# Patient Record
Sex: FEMALE | Race: BLACK OR AFRICAN AMERICAN | NOT HISPANIC OR LATINO
[De-identification: names, ages, dates, MRNs, and addresses within clinical notes are randomized per-mention and may not be internally consistent; named-entity substitution may affect disease eponyms.]

---

## 2017-11-10 PROBLEM — Z00.00 ENCOUNTER FOR PREVENTIVE HEALTH EXAMINATION: Status: ACTIVE | Noted: 2017-11-10

## 2017-11-13 ENCOUNTER — APPOINTMENT (OUTPATIENT)
Dept: INTERNAL MEDICINE | Facility: CLINIC | Age: 57
End: 2017-11-13

## 2017-11-27 ENCOUNTER — APPOINTMENT (OUTPATIENT)
Dept: INTERNAL MEDICINE | Facility: CLINIC | Age: 57
End: 2017-11-27

## 2018-08-12 ENCOUNTER — INPATIENT (INPATIENT)
Facility: HOSPITAL | Age: 58
LOS: 4 days | Discharge: LEFT BEFORE DISCHARGE | DRG: 871 | End: 2018-08-17
Attending: INTERNAL MEDICINE | Admitting: INTERNAL MEDICINE
Payer: MEDICARE

## 2018-08-12 VITALS
RESPIRATION RATE: 22 BRPM | SYSTOLIC BLOOD PRESSURE: 112 MMHG | OXYGEN SATURATION: 94 % | TEMPERATURE: 98 F | DIASTOLIC BLOOD PRESSURE: 77 MMHG | HEART RATE: 88 BPM

## 2018-08-12 DIAGNOSIS — R94.31 ABNORMAL ELECTROCARDIOGRAM [ECG] [EKG]: ICD-10-CM

## 2018-08-12 DIAGNOSIS — A41.9 SEPSIS, UNSPECIFIED ORGANISM: ICD-10-CM
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
## 2018-08-12 DIAGNOSIS — Z91.89 OTHER SPECIFIED PERSONAL RISK FACTORS, NOT ELSEWHERE CLASSIFIED: ICD-10-CM

## 2018-08-12 DIAGNOSIS — R74.8 ABNORMAL LEVELS OF OTHER SERUM ENZYMES: ICD-10-CM

## 2018-08-12 DIAGNOSIS — J18.1 LOBAR PNEUMONIA, UNSPECIFIED ORGANISM: ICD-10-CM

## 2018-08-12 DIAGNOSIS — R63.8 OTHER SYMPTOMS AND SIGNS CONCERNING FOOD AND FLUID INTAKE: ICD-10-CM

## 2018-08-12 DIAGNOSIS — J44.1 CHRONIC OBSTRUCTIVE PULMONARY DISEASE WITH (ACUTE) EXACERBATION: ICD-10-CM

## 2018-08-12 LAB
ALBUMIN SERPL ELPH-MCNC: 2.9 G/DL — LOW (ref 3.4–5)
ALP SERPL-CCNC: 97 U/L — SIGNIFICANT CHANGE UP (ref 40–120)
ALT FLD-CCNC: 270 U/L — HIGH (ref 12–42)
ANION GAP SERPL CALC-SCNC: 15 MMOL/L — SIGNIFICANT CHANGE UP (ref 9–16)
APTT BLD: 32.8 SEC — SIGNIFICANT CHANGE UP (ref 27.5–36.5)
AST SERPL-CCNC: 355 U/L — HIGH (ref 15–37)
BASE EXCESS BLDA CALC-SCNC: -4.4 MMOL/L — LOW (ref -2–3)
BILIRUB SERPL-MCNC: 1.5 MG/DL — HIGH (ref 0.2–1.2)
BUN SERPL-MCNC: 16 MG/DL — SIGNIFICANT CHANGE UP (ref 7–23)
CALCIUM SERPL-MCNC: 9.1 MG/DL — SIGNIFICANT CHANGE UP (ref 8.5–10.5)
CHLORIDE SERPL-SCNC: 99 MMOL/L — SIGNIFICANT CHANGE UP (ref 96–108)
CK MB BLD-MCNC: 0.31 % — SIGNIFICANT CHANGE UP
CK MB CFR SERPL CALC: 3.6 NG/ML — SIGNIFICANT CHANGE UP (ref 0.5–3.6)
CK SERPL-CCNC: 1180 U/L — HIGH (ref 26–192)
CO2 SERPL-SCNC: 20 MMOL/L — LOW (ref 22–31)
CREAT SERPL-MCNC: 1.36 MG/DL — HIGH (ref 0.5–1.3)
D DIMER BLD IA.RAPID-MCNC: 1099 NG/ML DDU — HIGH
GAS PNL BLDA: SIGNIFICANT CHANGE UP
GLUCOSE BLDC GLUCOMTR-MCNC: 202 MG/DL — HIGH (ref 70–99)
GLUCOSE SERPL-MCNC: 148 MG/DL — HIGH (ref 70–99)
HCO3 BLDA-SCNC: 19 MMOL/L — LOW (ref 21–28)
HCT VFR BLD CALC: 39.9 % — SIGNIFICANT CHANGE UP (ref 34.5–45)
HGB BLD-MCNC: 13 G/DL — SIGNIFICANT CHANGE UP (ref 11.5–15.5)
INR BLD: 1.5 — HIGH (ref 0.88–1.16)
LACTATE SERPL-SCNC: 1.6 MMOL/L — SIGNIFICANT CHANGE UP (ref 0.4–2)
MCHC RBC-ENTMCNC: 26.1 PG — LOW (ref 27–34)
MCHC RBC-ENTMCNC: 32.6 G/DL — SIGNIFICANT CHANGE UP (ref 32–36)
MCV RBC AUTO: 80.1 FL — SIGNIFICANT CHANGE UP (ref 80–100)
PCO2 BLDA: 32 MMHG — SIGNIFICANT CHANGE UP (ref 32–45)
PH BLDA: 7.41 — SIGNIFICANT CHANGE UP (ref 7.35–7.45)
PLATELET # BLD AUTO: 181 K/UL — SIGNIFICANT CHANGE UP (ref 150–400)
PO2 BLDA: 89 MMHG — SIGNIFICANT CHANGE UP (ref 83–108)
POTASSIUM SERPL-MCNC: 3.4 MMOL/L — LOW (ref 3.5–5.3)
POTASSIUM SERPL-SCNC: 3.4 MMOL/L — LOW (ref 3.5–5.3)
PROT SERPL-MCNC: 8.4 G/DL — HIGH (ref 6.4–8.2)
PROTHROM AB SERPL-ACNC: 16.6 SEC — HIGH (ref 9.8–12.7)
RBC # BLD: 4.98 M/UL — SIGNIFICANT CHANGE UP (ref 3.8–5.2)
RBC # FLD: 14.5 % — SIGNIFICANT CHANGE UP (ref 10.3–16.9)
SAO2 % BLDA: 97 % — SIGNIFICANT CHANGE UP (ref 95–100)
SODIUM SERPL-SCNC: 134 MMOL/L — SIGNIFICANT CHANGE UP (ref 132–145)
TROPONIN I SERPL-MCNC: <0.017 NG/ML — LOW (ref 0.02–0.06)
WBC # BLD: 11.4 K/UL — HIGH (ref 3.8–10.5)
WBC # FLD AUTO: 11.4 K/UL — HIGH (ref 3.8–10.5)

## 2018-08-12 PROCEDURE — 93010 ELECTROCARDIOGRAM REPORT: CPT

## 2018-08-12 PROCEDURE — 71045 X-RAY EXAM CHEST 1 VIEW: CPT | Mod: 26

## 2018-08-12 PROCEDURE — 71275 CT ANGIOGRAPHY CHEST: CPT | Mod: 26

## 2018-08-12 PROCEDURE — 99223 1ST HOSP IP/OBS HIGH 75: CPT | Mod: GC

## 2018-08-12 PROCEDURE — 99220: CPT | Mod: 25

## 2018-08-12 RX ORDER — VANCOMYCIN HCL 1 G
1250 VIAL (EA) INTRAVENOUS ONCE
Qty: 0 | Refills: 0 | Status: COMPLETED | OUTPATIENT
Start: 2018-08-12 | End: 2018-08-12

## 2018-08-12 RX ORDER — PETROLATUM,WHITE
1 JELLY (GRAM) TOPICAL DAILY
Qty: 0 | Refills: 0 | Status: DISCONTINUED | OUTPATIENT
Start: 2018-08-12 | End: 2018-08-17

## 2018-08-12 RX ORDER — VANCOMYCIN HCL 1 G
VIAL (EA) INTRAVENOUS
Qty: 0 | Refills: 0 | Status: DISCONTINUED | OUTPATIENT
Start: 2018-08-12 | End: 2018-08-12

## 2018-08-12 RX ORDER — PIPERACILLIN AND TAZOBACTAM 4; .5 G/20ML; G/20ML
3.38 INJECTION, POWDER, LYOPHILIZED, FOR SOLUTION INTRAVENOUS ONCE
Qty: 0 | Refills: 0 | Status: COMPLETED | OUTPATIENT
Start: 2018-08-12 | End: 2018-08-12

## 2018-08-12 RX ORDER — IPRATROPIUM/ALBUTEROL SULFATE 18-103MCG
3 AEROSOL WITH ADAPTER (GRAM) INHALATION EVERY 4 HOURS
Qty: 0 | Refills: 0 | Status: DISCONTINUED | OUTPATIENT
Start: 2018-08-12 | End: 2018-08-17

## 2018-08-12 RX ORDER — SODIUM CHLORIDE 9 MG/ML
1000 INJECTION, SOLUTION INTRAVENOUS
Qty: 0 | Refills: 0 | Status: DISCONTINUED | OUTPATIENT
Start: 2018-08-12 | End: 2018-08-12

## 2018-08-12 RX ORDER — DEXTROSE 50 % IN WATER 50 %
25 SYRINGE (ML) INTRAVENOUS ONCE
Qty: 0 | Refills: 0 | Status: DISCONTINUED | OUTPATIENT
Start: 2018-08-12 | End: 2018-08-17

## 2018-08-12 RX ORDER — GLUCAGON INJECTION, SOLUTION 0.5 MG/.1ML
1 INJECTION, SOLUTION SUBCUTANEOUS ONCE
Qty: 0 | Refills: 0 | Status: DISCONTINUED | OUTPATIENT
Start: 2018-08-12 | End: 2018-08-17

## 2018-08-12 RX ORDER — VANCOMYCIN HCL 1 G
1250 VIAL (EA) INTRAVENOUS EVERY 12 HOURS
Qty: 0 | Refills: 0 | Status: DISCONTINUED | OUTPATIENT
Start: 2018-08-13 | End: 2018-08-13

## 2018-08-12 RX ORDER — PIPERACILLIN AND TAZOBACTAM 4; .5 G/20ML; G/20ML
3.38 INJECTION, POWDER, LYOPHILIZED, FOR SOLUTION INTRAVENOUS EVERY 6 HOURS
Qty: 0 | Refills: 0 | Status: DISCONTINUED | OUTPATIENT
Start: 2018-08-12 | End: 2018-08-13

## 2018-08-12 RX ORDER — DEXTROSE 50 % IN WATER 50 %
15 SYRINGE (ML) INTRAVENOUS ONCE
Qty: 0 | Refills: 0 | Status: DISCONTINUED | OUTPATIENT
Start: 2018-08-12 | End: 2018-08-17

## 2018-08-12 RX ORDER — IPRATROPIUM/ALBUTEROL SULFATE 18-103MCG
3 AEROSOL WITH ADAPTER (GRAM) INHALATION EVERY 6 HOURS
Qty: 0 | Refills: 0 | Status: DISCONTINUED | OUTPATIENT
Start: 2018-08-12 | End: 2018-08-12

## 2018-08-12 RX ORDER — NICOTINE POLACRILEX 2 MG
1 GUM BUCCAL DAILY
Qty: 0 | Refills: 0 | Status: DISCONTINUED | OUTPATIENT
Start: 2018-08-12 | End: 2018-08-17

## 2018-08-12 RX ORDER — IPRATROPIUM BROMIDE 0.2 MG/ML
500 SOLUTION, NON-ORAL INHALATION ONCE
Qty: 0 | Refills: 0 | Status: COMPLETED | OUTPATIENT
Start: 2018-08-12 | End: 2018-08-12

## 2018-08-12 RX ORDER — DEXTROSE 50 % IN WATER 50 %
12.5 SYRINGE (ML) INTRAVENOUS ONCE
Qty: 0 | Refills: 0 | Status: DISCONTINUED | OUTPATIENT
Start: 2018-08-12 | End: 2018-08-17

## 2018-08-12 RX ORDER — ACETAMINOPHEN 500 MG
650 TABLET ORAL EVERY 6 HOURS
Qty: 0 | Refills: 0 | Status: DISCONTINUED | OUTPATIENT
Start: 2018-08-12 | End: 2018-08-13

## 2018-08-12 RX ORDER — ALBUTEROL 90 UG/1
2.5 AEROSOL, METERED ORAL
Qty: 0 | Refills: 0 | Status: COMPLETED | OUTPATIENT
Start: 2018-08-12 | End: 2018-08-12

## 2018-08-12 RX ORDER — IPRATROPIUM/ALBUTEROL SULFATE 18-103MCG
3 AEROSOL WITH ADAPTER (GRAM) INHALATION ONCE
Qty: 0 | Refills: 0 | Status: COMPLETED | OUTPATIENT
Start: 2018-08-12 | End: 2018-08-12

## 2018-08-12 RX ORDER — SODIUM CHLORIDE 9 MG/ML
1000 INJECTION INTRAMUSCULAR; INTRAVENOUS; SUBCUTANEOUS
Qty: 0 | Refills: 0 | Status: DISCONTINUED | OUTPATIENT
Start: 2018-08-12 | End: 2018-08-14

## 2018-08-12 RX ORDER — MAGNESIUM SULFATE 500 MG/ML
2 VIAL (ML) INJECTION ONCE
Qty: 0 | Refills: 0 | Status: COMPLETED | OUTPATIENT
Start: 2018-08-12 | End: 2018-08-12

## 2018-08-12 RX ORDER — INSULIN LISPRO 100/ML
VIAL (ML) SUBCUTANEOUS
Qty: 0 | Refills: 0 | Status: DISCONTINUED | OUTPATIENT
Start: 2018-08-12 | End: 2018-08-16

## 2018-08-12 RX ORDER — HEPARIN SODIUM 5000 [USP'U]/ML
5000 INJECTION INTRAVENOUS; SUBCUTANEOUS EVERY 8 HOURS
Qty: 0 | Refills: 0 | Status: DISCONTINUED | OUTPATIENT
Start: 2018-08-12 | End: 2018-08-17

## 2018-08-12 RX ORDER — SODIUM CHLORIDE 9 MG/ML
1000 INJECTION INTRAMUSCULAR; INTRAVENOUS; SUBCUTANEOUS ONCE
Qty: 0 | Refills: 0 | Status: COMPLETED | OUTPATIENT
Start: 2018-08-12 | End: 2018-08-12

## 2018-08-12 RX ADMIN — ALBUTEROL 2.5 MILLIGRAM(S): 90 AEROSOL, METERED ORAL at 08:10

## 2018-08-12 RX ADMIN — HEPARIN SODIUM 5000 UNIT(S): 5000 INJECTION INTRAVENOUS; SUBCUTANEOUS at 14:51

## 2018-08-12 RX ADMIN — Medication 3 MILLILITER(S): at 21:22

## 2018-08-12 RX ADMIN — PIPERACILLIN AND TAZOBACTAM 200 GRAM(S): 4; .5 INJECTION, POWDER, LYOPHILIZED, FOR SOLUTION INTRAVENOUS at 20:57

## 2018-08-12 RX ADMIN — SODIUM CHLORIDE 1000 MILLILITER(S): 9 INJECTION, SOLUTION INTRAVENOUS at 10:08

## 2018-08-12 RX ADMIN — ALBUTEROL 2.5 MILLIGRAM(S): 90 AEROSOL, METERED ORAL at 08:38

## 2018-08-12 RX ADMIN — ALBUTEROL 2.5 MILLIGRAM(S): 90 AEROSOL, METERED ORAL at 10:08

## 2018-08-12 RX ADMIN — ALBUTEROL 2.5 MILLIGRAM(S): 90 AEROSOL, METERED ORAL at 09:26

## 2018-08-12 RX ADMIN — Medication 500 MICROGRAM(S): at 08:28

## 2018-08-12 RX ADMIN — Medication 650 MILLIGRAM(S): at 12:47

## 2018-08-12 RX ADMIN — ALBUTEROL 2.5 MILLIGRAM(S): 90 AEROSOL, METERED ORAL at 08:28

## 2018-08-12 RX ADMIN — Medication 166.67 MILLIGRAM(S): at 11:56

## 2018-08-12 RX ADMIN — Medication 3 MILLILITER(S): at 19:09

## 2018-08-12 RX ADMIN — Medication 4: at 21:40

## 2018-08-12 RX ADMIN — Medication 1 PATCH: at 21:40

## 2018-08-12 RX ADMIN — PIPERACILLIN AND TAZOBACTAM 200 GRAM(S): 4; .5 INJECTION, POWDER, LYOPHILIZED, FOR SOLUTION INTRAVENOUS at 10:08

## 2018-08-12 RX ADMIN — Medication 50 GRAM(S): at 08:28

## 2018-08-12 RX ADMIN — Medication 3 MILLILITER(S): at 14:51

## 2018-08-12 RX ADMIN — Medication 125 MILLIGRAM(S): at 08:37

## 2018-08-12 RX ADMIN — ALBUTEROL 2.5 MILLIGRAM(S): 90 AEROSOL, METERED ORAL at 10:40

## 2018-08-12 RX ADMIN — SODIUM CHLORIDE 100 MILLILITER(S): 9 INJECTION INTRAMUSCULAR; INTRAVENOUS; SUBCUTANEOUS at 14:52

## 2018-08-12 RX ADMIN — SODIUM CHLORIDE 250 MILLILITER(S): 9 INJECTION INTRAMUSCULAR; INTRAVENOUS; SUBCUTANEOUS at 08:28

## 2018-08-12 RX ADMIN — HEPARIN SODIUM 5000 UNIT(S): 5000 INJECTION INTRAVENOUS; SUBCUTANEOUS at 21:40

## 2018-08-12 NOTE — ED ADULT NURSE NOTE - OBJECTIVE STATEMENT
Pt is a 58y female complaining of asthma exacerbation for the past 3 days. Pt states that she is sob and is having chest and back pain. Pt is speaking in short sentences. PT states that she does have a history of asthma but she has never been hospitalized or intubated. Pt tachypneic and has bilateral wheezing.

## 2018-08-12 NOTE — ED ADULT NURSE REASSESSMENT NOTE - NS ED NURSE REASSESS COMMENT FT1
Provider made aware that Zosyn will be finished soon. PT made aware, to hold Vancomycin until pt gets to [facility].

## 2018-08-12 NOTE — ED PROVIDER NOTE - MEDICAL DECISION MAKING DETAILS
asthma for days, cough and wheeze, POx 88% without oxygen or neb, never intubated, will admit to CDU and administer steroids IV, magnesium, albuterol, ipratroprium and check labs and CXR

## 2018-08-12 NOTE — ED CDU PROVIDER DISPOSITION NOTE - CLINICAL COURSE
pneumonia LLL on CT, COPD, status astmaticus, admit to [place] monitored bed, d/w Dr Villalobos through transfer center line

## 2018-08-12 NOTE — H&P ADULT - ATTENDING COMMENTS
Patient seen and examined with house-staff during bedside rounds  Resident note read, including vitals, physical findings, laboratory data, and radiological reports.   Revisions included below.  Case discussed with House staff  Direct personal management at bedside  and extensive interpretation of data. Decision making of high complexity.    Patient may have mass; Also elevated LFT noted; Sonogram to be done

## 2018-08-12 NOTE — ED PROVIDER NOTE - CRITICAL CARE PROVIDED
consultation with other physicians/direct patient care (not related to procedure)/interpretation of diagnostic studies/documentation/additional history taking

## 2018-08-12 NOTE — ED PROVIDER NOTE - DIAGNOSTIC INTERPRETATION
Chest x-ray interpreted by ER Physician Dr. Daniel  Findings: heart size normal, no infiltrates, lungs fully expanded, soft tissues appear normal.

## 2018-08-12 NOTE — H&P ADULT - PROBLEM SELECTOR PLAN 6
1) PCP Contacted on Admission: (Y/N) --> Name & Phone #: UNKNOWN  2) Date of Contact with PCP: N/A  3) PCP Contacted at Discharge: (Y/N)  4) Summary of Handoff Given to PCP:   5) Post-Discharge Appointment Date and Location: Fluids: NaCl 0.9% 100cc/hr  Electrolytes: replete as necessary, K>4, Mg>2   Nutrition: DASH/TLC diet    Bowel Regimen: not indicated  DVT ppx: heparin 5000 subQ q8h  Code: Full  [facility] noted to have elevated CK, unclear etiology. No recent falls.   -Continue to trend  -f/u UA, myoglobin

## 2018-08-12 NOTE — H&P ADULT - NSHPPHYSICALEXAM_GEN_ALL_CORE
Constitutional:  female, obese, cooperative, on high flow, accessory muscle use, able to speak in full sentences   HEENT: NCAT, anicteric  Respiratory: CTAB, no w/r/r   Cardiovascular: tachycardia regular rhythm, no m/r/g  Gastrointestinal: soft, NTND, no masses palpable, BS normal  Extremities: Warm, well perfused, no edema, no clubbing  Neurological: AAOx3  Skin: Normal temperature, warm, dry Constitutional:  female, obese, cooperative, on high flow NC, no accessory muscle use, able to speak in full sentences   HEENT: NCAT, anicteric, no conjunctival pallor, MMM, no oropharyngeal erythema or exudates  Lymph: No cervical or supraclavicular LAD  Respiratory: decreased BS throughout, no w/r/r appreciated on auscultation  Cardiovascular: tachycardic, regular rhythm, no m/r/g appreciated   Gastrointestinal: soft, NTND, no masses palpable, BS normal  : no fajardo in place  Extremities: Warm, well perfused, no edema, no clubbing  Neurological: AAOx3, CN II-XII grossly intact  Skin: Normal temperature, warm, dry

## 2018-08-12 NOTE — H&P ADULT - PROBLEM SELECTOR PLAN 9
Fluids: NaCl 0.9% 100cc/hr  Electrolytes: replete as necessary, K>4, Mg>2   Nutrition: DASH/TLC diet    Bowel Regimen: not indicated  DVT ppx: heparin 5000 subQ q8h  Code: Full  [facility]

## 2018-08-12 NOTE — H&P ADULT - PROBLEM SELECTOR PLAN 4
EKG QT: 521 on admission  -hold off any meds that can prolong QTc  -repeat EKG later EKG QT: 521 on admission  -hold off any meds that can prolong QTc, including Azithromycin  -repeat EKG later EKG with prolonged QT on admission, 521  -hold off any QT prolonging meds, including Azithromycin  -f/u AM EKG  -Continue to monitor Pt states she drank last Thursday a 1/2 pint of alcohol and drinks weekly. Unknown history of liver pathology.  -f/u abdominal ultrasound to rule out and liver lesions  -Continue to trend LFTs with daily labs sCr 1.36 on admission, unknown baseline, unclear etiology at this time, possibly pre-renal 2/2 insensible losses as patient endorsing fevers at home vs intrinsic given elevated CK, ?Rhabdomyolysis  -obtain UA, Urine studies, calculate FeNa  -Continue to trend sCr with daily CMPs  -Avoid nephrotoxic agents

## 2018-08-12 NOTE — H&P ADULT - HISTORY OF PRESENT ILLNESS
59yo  female PMHx asthma, COPD, current smoker unspecified amount of PPD), no hospitalizations in the past 59yo  female PMHx asthma, COPD, current smoker unspecified amount of PPD), no hospitalizations or intubations in the past presented to [facility] ER for SOB for the past 3 days.  She did not use her asthma pump in the past few days. She admits to having fevers at home and a nonpurulent cough. Pt states that this is the worst episode she has ever had and the reason why she presented to the [facility] ED. In [facility] she was given albuterol She does not see a PCP for her management of her asthma. Denies any sick contacts, recent travel, chest pain, dizziness, headache, runny nose, orthopnea, change in urinary or bowel habits. 57yo  female PMHx asthma, COPD, current smoker unspecified amount of PPD), no hospitalizations or intubations in the past presented to [facility] ER for SOB for the past 3 days.  She did not use her asthma pump in the past few days. She admits to having fevers at home and a nonpurulent cough. Pt states that this is the worst episode she has ever had and the reason why she presented to the [facility] ED.   She does not see a PCP for her management of her asthma. Denies any sick contacts, recent travel, chest pain, dizziness, headache, runny nose, orthopnea, change in urinary or bowel habits. CXR showed LLL infiltrate. CT chest: LLL with left mediastinal lympadenopathy, severe emphysema.    In [facility] she was given 2x albuterol, duonebs, 125 mg methylprednisolone IVP, 2g Mag IVPB. In the ED T: 98 HR: 88 RR: BP: 112/77 SpO2: 94. Pt also had a elevalted liver enzymes: , ALT: 270. EKG showed prolong QT and sinus tachycardia. She was given 1250mg vancomycin and 3.375 piperzillin/tazobactam. Azithromycin and FQ was not given in the setting of prolonged QT.     Pt was transferred to [facility] for further management for CAP 2/2 in the setting of acute COPD exacerbation. 59yo  female PMHx asthma, COPD, current smoker unspecified amount of PPD), no hospitalizations or intubations in the past presented to [facility] ER for SOB for the past 3 days.  She did not use her asthma pump in the past few days. She admits to having fevers at home and a nonpurulent cough. Pt states that this is the worst episode she has ever had and the reason why she presented to the [facility] ED.   She does not see a PCP for her management of her asthma. Denies any sick contacts, recent travel, chest pain, dizziness, headache, runny nose, orthopnea, change in urinary or bowel habits. CXR showed LLL infiltrate. CT chest: LLL with left mediastinal lympadenopathy, severe emphysema.    In [facility] she was given 2x albuterol, duonebs, 125 mg methylprednisolone IVP, 2g Mag IVPB. In the ED T: 98 HR: 88 RR: BP: 112/77 SpO2: 94. Pt also had a elevalted liver enzymes: , ALT: 270. EKG showed prolong QT and sinus tachycardia, negative troponins. She was given 1250mg vancomycin and 3.375 piperzillin/tazobactam. Azithromycin and FQ was not given in the setting of prolonged      Pt was transferred to [facility] for further management for CAP 2/2 in the setting of acute COPD exacerbation. 59yo  female PMHx asthma, COPD, current smoker unspecified amount of PPD), no hospitalizations or intubations in the past presented to [facility] ER for SOB for the past 3 days.  She did not use her asthma pump in the past few days. She admits to having fevers at home and a nonpurulent cough. Pt states that this is the worst episode she has ever had and the reason why she presented to the [facility] ED.   She does not see a PCP for her management of her asthma. Denies any sick contacts, recent travel, chest pain, dizziness, headache, runny nose, orthopnea, change in urinary or bowel habits. CXR showed LLL infiltrate. CT chest: LLL with left mediastinal lympadenopathy, severe emphysema.    In [facility] she was given 2x albuterol, duonebs, 125 mg methylprednisolone IVP, 2g Mag IVPB. In the ED T: 98 HR: 88 RR: 22 BP: 112/77 SpO2: 94. Pt also had a elevalted liver enzymes: , ALT: 270. EKG showed prolong QT and sinus tachycardia, negative troponins. She was given 1250mg vancomycin and 3.375 piperzillin/tazobactam. Azithromycin and FQ was not given in the setting of prolonged      Pt was transferred to [facility] for further management for CAP 2/2 in the setting of acute COPD exacerbation. 59yo  female PMHx asthma, COPD, current smoker unspecified amount of PPD), no hospitalizations or intubations in the past presented to [facility] ER for SOB for the past 3 days.  She did not use her asthma pump in the past few days. She admits to having fevers at home and a nonpurulent cough. Pt states that this is the worst episode she has ever had and the reason why she presented to the [facility] ED.   She does not see a PCP for her management of her asthma. Denies any sick contacts, recent travel, chest pain, dizziness, headache, runny nose, orthopnea, change in urinary or bowel habits. CXR showed LLL infiltrate. CT chest: LLL with left mediastinal lympadenopathy, severe emphysema.    In [facility] she was given 2x albuterol, duonebs, 125 mg methylprednisolone IVP, 2g Mag IVPB. In the ED T: 98 HR: 88 RR: 22 BP: 112/77 SpO2: 94.  Wbc: 11.4, no leukocytosis. Pt also had a elevated liver enzymes: , ALT: 270. EKG showed prolong QT and sinus tachycardia, negative troponins. She was given 1250mg vancomycin and 3.375 piperzillin/tazobactam. Azithromycin and FQ was not given in the setting of prolonged      Pt was transferred to [facility] for further management for CAP 2/2 in the setting of acute COPD exacerbation. 59yo  female PMHx asthma, COPD, current smoker (unspecified amount of years), no hospitalizations or intubations in the past presented to [facility] ER for SOB for the past 3 days.  She did not use her asthma pump in the past few days. She admits to having fevers at home and a nonpurulent cough. Pt states that this is the worst episode she has ever had and the reason why she presented to the [facility] ED.   She does not see a PCP for her management of her asthma. Denies any sick contacts, recent travel, chest pain, dizziness, headache, runny nose, orthopnea, change in urinary or bowel habits. CXR showed LLL infiltrate. CT chest: LLL with left mediastinal lympadenopathy, severe emphysema.    In [facility] she was given 2x albuterol, duonebs, 125 mg methylprednisolone IVP, 2g Mag IVPB. In the ED T: 98 HR: 88 RR: 22 BP: 112/77 SpO2: 94.  Wbc: 11.4, no leukocytosis. Pt also had a elevated liver enzymes: , ALT: 270. EKG showed prolong QT and sinus tachycardia, negative troponins. She was given 1250mg vancomycin and 3.375 piperzillin/tazobactam. Azithromycin and FQ was not given in the setting of prolonged      Pt was transferred to [facility] for further management for CAP 2/2 in the setting of acute COPD exacerbation.

## 2018-08-12 NOTE — H&P ADULT - PROBLEM SELECTOR PLAN 2
Transfer to [facility], pt lung exam clear without wheezes, saturating above 88% on HILFO.   -c/w HIFLO and wean as needed  -c/w duonebs Transfer to [facility], pt lung exam clear without wheezes, saturating above 88% on HILFO.   -c/w HIFLO and wean as needed  -c/w duonebs  -keep SpO2 between 88-92%  -will consider steroids in respiratory status worsens. Transfer to [facility], pt lung exam clear without wheezes, saturating above 88% on HILFLONC   -c/w HIFLO NC and wean as needed  -c/w duonebs  -keep SpO2 between 88-92%  -will consider steroids in respiratory status worsens. Transfer to [facility], pt lung exam clear without wheezes, saturating above 88% on HILFLONC   -c/w HIFLO NC and wean as needed  -c/w duonebs  -keep SpO2 between 88-92%  -s/p SoluMedrol 125 mg IVP x1 in ED, will c/w SoluMedrol 40 mg IVP q6h starting 8/13  -No known history of DM, ISS while on steroids Patient p/w progressive SOB, requiring frequent resscue inhaler at home, admitted for COPD exacerbation 2/2 CAP (LLL infiltrate on CXR). Lungs clear without wheezes. s/p multiple runs of duonebs, [id number], SoluMedrol 125 mg IVP x1 in ED, transferred to [facility] from [facility] withi non-rebreather mask. Switched to HFNC upon arrival, saturating well.  -c/w HIFLO NC and titrate as tolerated  -c/w duonebs  -keep SpO2 between 88-92%  -s/p SoluMedrol 125 mg IVP x1 in ED, will c/w SoluMedrol 40 mg IVP q6h starting 8/13  -No known history of DM, ISS while on steroids CAP Pneumonia in the setting of COPD exacerbation  History of tobacco use and COPD and first hospitalization. CXR demonstrating LLL infiltrate. Most likely CAP in the setting of COPD exacerbation, afebrile on admission, wbc count unremarkable, not septic. Less likely on differential: TB, malignancy, CHF. Malignancy cannot be excluded based on CT scan. Pulmonary embolism ruled out on CT scan.   -CXR: LLL infiltrate  - f/u blood cx  - Given prolonged QTC, not a candidate for Macrolide or FQ. c/w vancomycin 1250 mg IV every 12 hours and 3.375 g zosyn q6h  -Will consider repeat CT a few days later to exclude possibility of any malignant mass after finish course of antibiotics. Consider pulm consult in AM

## 2018-08-12 NOTE — H&P ADULT - ASSESSMENT
57yo  female PMHx asthma, COPD, current smoker unspecified amount of PPD), no hospitalizations or intubations presents with SOB, nonpurulent cough for a few days, foudn to have a LLL infiltrate, admitted and managed for CAP in the setting of 59yo  female PMHx asthma, COPD, current smoker unspecified amount of PPD), no hospitalizations or intubations presents with SOB, nonpurulent cough for a few days, foudn to have a LLL infiltrate, admitted and managed for CAP in the setting of COPD 59yo  female PMHx asthma, COPD, current smoker unspecified amount of PPD), no hospitalizations or intubations presents with SOB, nonpurulent cough for a few days, found to have a LLL infiltrate, admitted and managed for COPD exacerbation 2/2 sepsis in the setting of CAP.

## 2018-08-12 NOTE — H&P ADULT - PROBLEM SELECTOR PLAN 8
EKG with prolonged QT on admission, 521  -hold off any QT prolonging meds, including Azithromycin  -f/u AM EKG  -Continue to monitor

## 2018-08-12 NOTE — H&P ADULT - PROBLEM SELECTOR PLAN 3
Pt states she drank last Thursday a pint of alcohol and drinks weekly  -will do an abdominal ultrasound to rule out and liver lesions Pt states she drank last Thursday a 1/2 pint of alcohol and drinks weekly. Unknown history of liver pathology.  -f/u abdominal ultrasound to rule out and liver lesions  -Continue to trend LFTs with daily labs Patient p/w progressive SOB, requiring frequent resscue inhaler at home, admitted for COPD exacerbation 2/2 CAP (LLL infiltrate on CXR). Lungs clear without wheezes. s/p multiple runs of duonebs, [id number], SoluMedrol 125 mg IVP x1 in ED, transferred to [facility] from [facility] withi non-rebreather mask. Switched to HFNC upon arrival, saturating well.  -c/w HIFLO NC and titrate as tolerated  -c/w duonebs  -keep SpO2 between 88-92%  -s/p SoluMedrol 125 mg IVP x1 in ED, will c/w SoluMedrol 40 mg IVP q6h starting 8/13  -No known history of DM, ISS while on steroids

## 2018-08-12 NOTE — H&P ADULT - PROBLEM SELECTOR PLAN 7
1) PCP Contacted on Admission: (Y/N) --> Name & Phone #: UNKNOWN  2) Date of Contact with PCP: N/A  3) PCP Contacted at Discharge: (Y/N)  4) Summary of Handoff Given to PCP:   5) Post-Discharge Appointment Date and Location: Pt states she drank last Thursday a 1/2 pint of alcohol and drinks weekly. Unknown history of liver pathology.  -f/u abdominal ultrasound to rule out and liver lesions  -Continue to trend LFTs with daily labs

## 2018-08-12 NOTE — H&P ADULT - PROBLEM SELECTOR PLAN 1
CAP Pneumonia in the setting of COPD exacerbation  Differentials include: TB, pulmonary embolsim, malignancy  -CXR:  - Blood cultures: NTD  - Sputum culture:   - c/w vancomycin 1250 mg IV every 12 hours (day 3) ,cefepime 1 gram every 8 hours (day 3)   - f/u vanc trough today at 9 PM   - Repeat CXR today  - Will assess clinically, if no more fevers will consider deescalate atb . If spikes a fever (38.5C) will add anaerobic coverage and repeat blood cultures CAP Pneumonia in the setting of COPD exacerbation  History of tobacco use and COPD and first hospitalization. Most likely CAP in the setting of COPD exacerbation. Less likely on differential: TB, malignancy, CHF. Malignancy cannot be excluded based on CT scan. Pulmonary embolism ruled out on CT scan.   -CXR: LLL infiltrate  - f/u blood cx  - c/w vancomycin 1250 mg IV every 12 hours and 3.375 g zosyn  -Will consider repeat CT a few days later to exclude possiblity of any malignant mass after finish course of antibiotics CAP Pneumonia in the setting of COPD exacerbation  History of tobacco use and COPD and first hospitalization. Most likely CAP in the setting of COPD exacerbation, afebrile on admission, wbc count unremarkable, not septic. Less likely on differential: TB, malignancy, CHF. Malignancy cannot be excluded based on CT scan. Pulmonary embolism ruled out on CT scan.   -CXR: LLL infiltrate  - f/u blood cx  - c/w vancomycin 1250 mg IV every 12 hours and 3.375 g zosyn  -Will consider repeat CT a few days later to exclude possibility of any malignant mass after finish course of antibiotics CAP Pneumonia in the setting of COPD exacerbation  History of tobacco use and COPD and first hospitalization. Most likely CAP in the setting of COPD exacerbation, afebrile on admission, wbc count unremarkable, not septic. Less likely on differential: TB, malignancy, CHF. Malignancy cannot be excluded based on CT scan. Pulmonary embolism ruled out on CT scan.   -CXR: LLL infiltrate  -c/w vancomycin and zosyn  - f/u blood cx  - c/w vancomycin 1250 mg IV every 12 hours and 3.375 g zosyn  -Will consider repeat CT a few days later to exclude possibility of any malignant mass after finish course of antibiotics CAP Pneumonia in the setting of COPD exacerbation  History of tobacco use and COPD and first hospitalization. Most likely CAP in the setting of COPD exacerbation, afebrile on admission, wbc count unremarkable, not septic. Less likely on differential: TB, malignancy, CHF. Malignancy cannot be excluded based on CT scan. Pulmonary embolism ruled out on CT scan.   -CXR: LLL infiltrate  - f/u blood cx  - c/w vancomycin 1250 mg IV every 12 hours and 3.375 g zosyn q6h  -Will consider repeat CT a few days later to exclude possibility of any malignant mass after finish course of antibiotics CAP Pneumonia in the setting of COPD exacerbation  History of tobacco use and COPD and first hospitalization. CXR demonstrating LLL infiltrate. Most likely CAP in the setting of COPD exacerbation, afebrile on admission, wbc count unremarkable, not septic. Less likely on differential: TB, malignancy, CHF. Malignancy cannot be excluded based on CT scan. Pulmonary embolism ruled out on CT scan.   -CXR: LLL infiltrate  - f/u blood cx  - Given prolonged QTC, not a candidate for Macrolide or FQ. c/w vancomycin 1250 mg IV every 12 hours and 3.375 g zosyn q6h  -Will consider repeat CT a few days later to exclude possibility of any malignant mass after finish course of antibiotics. Consider pulm consult in AM Patient meeting 2/4 SIRS criteria (tachycardia, tachypnea) with + source (LLL infiltrate) on presentation with mild leukocytosis. Lactate negative. Patient reporting fevers, cough days leading up to admission. s/p Vanc/Zosyn x1 in ED. Admitted for sepsis in the setting of COPD exacerbation 2/2 CAP.  -c/w broad spectrum abxs with Vanc/Zosyn, de-escalate as appropriate  -f/u Bcx  -continue to trend WBC with daily CBCs

## 2018-08-12 NOTE — H&P ADULT - PROBLEM SELECTOR PLAN 10
1) PCP Contacted on Admission: (Y/N) --> Name & Phone #: UNKNOWN  2) Date of Contact with PCP: N/A  3) PCP Contacted at Discharge: (Y/N)  4) Summary of Handoff Given to PCP:   5) Post-Discharge Appointment Date and Location:

## 2018-08-12 NOTE — H&P ADULT - NSHPLABSRESULTS_GEN_ALL_CORE
.  LABS:                         13.0   11.4  )-----------( 181      ( 12 Aug 2018 08:18 )             39.9     08-12    134  |  99  |  16  ----------------------------<  148<H>  3.4<L>   |  20<L>  |  1.36<H>    Ca    9.1      12 Aug 2018 08:18    TPro  8.4<H>  /  Alb  2.9<L>  /  TBili  1.5<H>  /  DBili  x   /  AST  355<H>  /  ALT  270<H>  /  AlkPhos  97  08-12    PT/INR - ( 12 Aug 2018 08:18 )   PT: 16.6 sec;   INR: 1.50          PTT - ( 12 Aug 2018 08:18 )  PTT:32.8 sec    CARDIAC MARKERS ( 12 Aug 2018 08:18 )  <0.017 ng/mL / x     / 1180 U/L / x     / 3.6 ng/mL    RADIOLOGY, EKG & ADDITIONAL TESTS: Reviewed.

## 2018-08-12 NOTE — H&P ADULT - PROBLEM SELECTOR PROBLEM 6
Transition of care performed with sharing of clinical summary Nutrition, metabolism, and development symptoms Elevated CK

## 2018-08-12 NOTE — H&P ADULT - PROBLEM SELECTOR PLAN 5
Fluids: NaCl 0.9% 100cc/hr  Electrolytes: replete as necessary, K>4, Mg>2   Nutrition: consistent carb, DASH  Bowel Regimen:  DVT ppx:  Code: Full/DNR/DNI  Disposition: Home/AcuteRehab/Subacute rehab Fluids: NaCl 0.9% 100cc/hr  Electrolytes: replete as necessary, K>4, Mg>2   Nutrition: DASH diet  Bowel Regimen: not any  DVT ppx: heparin 5000 subQ q 8  Code: Full/DNR/DNI  Disposition: [facility] Fluids: NaCl 0.9% 100cc/hr  Electrolytes: replete as necessary, K>4, Mg>2   Nutrition: DASH diet  Bowel Regimen: not any  DVT ppx: heparin 5000 subQ q 8  Code: Full  Disposition: [facility] Fluids: NaCl 0.9% 100cc/hr  Electrolytes: replete as necessary, K>4, Mg>2   Nutrition: DASH/TLC diet    Bowel Regimen: not indicated  DVT ppx: heparin 5000 subQ q8h  Code: Full  [facility] with prolonged QT on [facility], 521  -hold off any QT prolonging meds, including Azithromycin  -f/u AM EKG  -Continue to monitor Patient p/w chest pain, with risk factors for PE including smoking history. Elevated D-dimer, CT chest negative for PE  -HepSUbQ, SCDs  -continue to closely monitor respiratory status

## 2018-08-13 DIAGNOSIS — R74.8 ABNORMAL LEVELS OF OTHER SERUM ENZYMES: ICD-10-CM

## 2018-08-13 DIAGNOSIS — N17.9 ACUTE KIDNEY FAILURE, UNSPECIFIED: ICD-10-CM

## 2018-08-13 DIAGNOSIS — R79.89 OTHER SPECIFIED ABNORMAL FINDINGS OF BLOOD CHEMISTRY: ICD-10-CM

## 2018-08-13 LAB
ALBUMIN SERPL ELPH-MCNC: 3.3 G/DL — SIGNIFICANT CHANGE UP (ref 3.3–5)
ALP SERPL-CCNC: 92 U/L — SIGNIFICANT CHANGE UP (ref 40–120)
ALT FLD-CCNC: 192 U/L — HIGH (ref 10–45)
ALT FLD-CCNC: 218 U/L — HIGH (ref 10–45)
ANION GAP SERPL CALC-SCNC: 15 MMOL/L — SIGNIFICANT CHANGE UP (ref 5–17)
ANION GAP SERPL CALC-SCNC: 17 MMOL/L — SIGNIFICANT CHANGE UP (ref 5–17)
APAP SERPL-MCNC: <15 UG/ML — SIGNIFICANT CHANGE UP (ref 10–30)
AST SERPL-CCNC: 112 U/L — HIGH (ref 10–40)
AST SERPL-CCNC: 164 U/L — HIGH (ref 10–40)
BILIRUB DIRECT SERPL-MCNC: 0.8 MG/DL — HIGH (ref 0–0.2)
BILIRUB INDIRECT FLD-MCNC: 0.4 MG/DL — SIGNIFICANT CHANGE UP (ref 0.2–1)
BILIRUB SERPL-MCNC: 1.2 MG/DL — SIGNIFICANT CHANGE UP (ref 0.2–1.2)
BUN SERPL-MCNC: 22 MG/DL — SIGNIFICANT CHANGE UP (ref 7–23)
BUN SERPL-MCNC: 22 MG/DL — SIGNIFICANT CHANGE UP (ref 7–23)
CALCIUM SERPL-MCNC: 8.7 MG/DL — SIGNIFICANT CHANGE UP (ref 8.4–10.5)
CALCIUM SERPL-MCNC: 9 MG/DL — SIGNIFICANT CHANGE UP (ref 8.4–10.5)
CHLORIDE SERPL-SCNC: 98 MMOL/L — SIGNIFICANT CHANGE UP (ref 96–108)
CHLORIDE SERPL-SCNC: 99 MMOL/L — SIGNIFICANT CHANGE UP (ref 96–108)
CK SERPL-CCNC: 960 U/L — HIGH (ref 25–170)
CO2 SERPL-SCNC: 19 MMOL/L — LOW (ref 22–31)
CO2 SERPL-SCNC: 19 MMOL/L — LOW (ref 22–31)
CREAT SERPL-MCNC: 1.15 MG/DL — SIGNIFICANT CHANGE UP (ref 0.5–1.3)
CREAT SERPL-MCNC: 1.15 MG/DL — SIGNIFICANT CHANGE UP (ref 0.5–1.3)
EOSINOPHIL NFR BLD AUTO: 1 % — SIGNIFICANT CHANGE UP (ref 0–6)
GLUCOSE BLDC GLUCOMTR-MCNC: 116 MG/DL — HIGH (ref 70–99)
GLUCOSE BLDC GLUCOMTR-MCNC: 134 MG/DL — HIGH (ref 70–99)
GLUCOSE BLDC GLUCOMTR-MCNC: 163 MG/DL — HIGH (ref 70–99)
GLUCOSE BLDC GLUCOMTR-MCNC: 179 MG/DL — HIGH (ref 70–99)
GLUCOSE SERPL-MCNC: 112 MG/DL — HIGH (ref 70–99)
GLUCOSE SERPL-MCNC: 230 MG/DL — HIGH (ref 70–99)
HCT VFR BLD CALC: 33.1 % — LOW (ref 34.5–45)
HCT VFR BLD CALC: 33.9 % — LOW (ref 34.5–45)
HGB BLD-MCNC: 10.9 G/DL — LOW (ref 11.5–15.5)
HGB BLD-MCNC: 11.1 G/DL — LOW (ref 11.5–15.5)
LACTATE SERPL-SCNC: 2.7 MMOL/L — HIGH (ref 0.5–2)
LYMPHOCYTES # BLD AUTO: 5 % — LOW (ref 13–44)
MAGNESIUM SERPL-MCNC: 2.3 MG/DL — SIGNIFICANT CHANGE UP (ref 1.6–2.6)
MAGNESIUM SERPL-MCNC: 2.5 MG/DL — SIGNIFICANT CHANGE UP (ref 1.6–2.6)
MCHC RBC-ENTMCNC: 25.7 PG — LOW (ref 27–34)
MCHC RBC-ENTMCNC: 25.8 PG — LOW (ref 27–34)
MCHC RBC-ENTMCNC: 32.7 G/DL — SIGNIFICANT CHANGE UP (ref 32–36)
MCHC RBC-ENTMCNC: 32.9 G/DL — SIGNIFICANT CHANGE UP (ref 32–36)
MCV RBC AUTO: 78.1 FL — LOW (ref 80–100)
MCV RBC AUTO: 78.7 FL — LOW (ref 80–100)
MONOCYTES NFR BLD AUTO: 2 % — SIGNIFICANT CHANGE UP (ref 2–14)
NEUTROPHILS NFR BLD AUTO: 59 % — SIGNIFICANT CHANGE UP (ref 43–77)
PCO2 BLDV: 43 MMHG — SIGNIFICANT CHANGE UP (ref 41–51)
PH BLDV: 6.79 — CRITICAL LOW (ref 7.32–7.43)
PLATELET # BLD AUTO: 202 K/UL — SIGNIFICANT CHANGE UP (ref 150–400)
PLATELET # BLD AUTO: 210 K/UL — SIGNIFICANT CHANGE UP (ref 150–400)
PO2 BLDV: 183 MMHG — SIGNIFICANT CHANGE UP
POTASSIUM SERPL-MCNC: 2.9 MMOL/L — CRITICAL LOW (ref 3.5–5.3)
POTASSIUM SERPL-MCNC: 3.3 MMOL/L — LOW (ref 3.5–5.3)
POTASSIUM SERPL-SCNC: 2.9 MMOL/L — CRITICAL LOW (ref 3.5–5.3)
POTASSIUM SERPL-SCNC: 3.3 MMOL/L — LOW (ref 3.5–5.3)
PROT SERPL-MCNC: 7.3 G/DL — SIGNIFICANT CHANGE UP (ref 6–8.3)
RBC # BLD: 4.24 M/UL — SIGNIFICANT CHANGE UP (ref 3.8–5.2)
RBC # BLD: 4.31 M/UL — SIGNIFICANT CHANGE UP (ref 3.8–5.2)
RBC # FLD: 14.6 % — SIGNIFICANT CHANGE UP (ref 10.3–16.9)
RBC # FLD: 14.8 % — SIGNIFICANT CHANGE UP (ref 10.3–16.9)
SAO2 % BLDV: 99 % — SIGNIFICANT CHANGE UP
SODIUM SERPL-SCNC: 132 MMOL/L — LOW (ref 135–145)
SODIUM SERPL-SCNC: 135 MMOL/L — SIGNIFICANT CHANGE UP (ref 135–145)
WBC # BLD: 10 K/UL — SIGNIFICANT CHANGE UP (ref 3.8–10.5)
WBC # BLD: 10.2 K/UL — SIGNIFICANT CHANGE UP (ref 3.8–10.5)
WBC # FLD AUTO: 10 K/UL — SIGNIFICANT CHANGE UP (ref 3.8–10.5)
WBC # FLD AUTO: 10.2 K/UL — SIGNIFICANT CHANGE UP (ref 3.8–10.5)

## 2018-08-13 PROCEDURE — 99233 SBSQ HOSP IP/OBS HIGH 50: CPT

## 2018-08-13 RX ORDER — DIPHENHYDRAMINE HCL 50 MG
25 CAPSULE ORAL EVERY 4 HOURS
Qty: 0 | Refills: 0 | Status: DISCONTINUED | OUTPATIENT
Start: 2018-08-13 | End: 2018-08-15

## 2018-08-13 RX ORDER — CEFEPIME 1 G/1
2000 INJECTION, POWDER, FOR SOLUTION INTRAMUSCULAR; INTRAVENOUS EVERY 8 HOURS
Qty: 0 | Refills: 0 | Status: DISCONTINUED | OUTPATIENT
Start: 2018-08-13 | End: 2018-08-14

## 2018-08-13 RX ORDER — POTASSIUM CHLORIDE 20 MEQ
40 PACKET (EA) ORAL EVERY 4 HOURS
Qty: 0 | Refills: 0 | Status: COMPLETED | OUTPATIENT
Start: 2018-08-13 | End: 2018-08-13

## 2018-08-13 RX ORDER — ACETAMINOPHEN 500 MG
650 TABLET ORAL EVERY 6 HOURS
Qty: 0 | Refills: 0 | Status: DISCONTINUED | OUTPATIENT
Start: 2018-08-13 | End: 2018-08-13

## 2018-08-13 RX ORDER — CEFEPIME 1 G/1
INJECTION, POWDER, FOR SOLUTION INTRAMUSCULAR; INTRAVENOUS
Qty: 0 | Refills: 0 | Status: DISCONTINUED | OUTPATIENT
Start: 2018-08-13 | End: 2018-08-13

## 2018-08-13 RX ORDER — TIOTROPIUM BROMIDE AND OLODATEROL 3.124; 2.736 UG/1; UG/1
2 SPRAY, METERED RESPIRATORY (INHALATION) DAILY
Qty: 0 | Refills: 0 | Status: DISCONTINUED | OUTPATIENT
Start: 2018-08-14 | End: 2018-08-15

## 2018-08-13 RX ORDER — OXYCODONE AND ACETAMINOPHEN 5; 325 MG/1; MG/1
1 TABLET ORAL ONCE
Qty: 0 | Refills: 0 | Status: DISCONTINUED | OUTPATIENT
Start: 2018-08-13 | End: 2018-08-13

## 2018-08-13 RX ORDER — CEFEPIME 1 G/1
2000 INJECTION, POWDER, FOR SOLUTION INTRAMUSCULAR; INTRAVENOUS ONCE
Qty: 0 | Refills: 0 | Status: COMPLETED | OUTPATIENT
Start: 2018-08-13 | End: 2018-08-13

## 2018-08-13 RX ORDER — SODIUM CHLORIDE 9 MG/ML
1000 INJECTION INTRAMUSCULAR; INTRAVENOUS; SUBCUTANEOUS ONCE
Qty: 0 | Refills: 0 | Status: COMPLETED | OUTPATIENT
Start: 2018-08-13 | End: 2018-08-13

## 2018-08-13 RX ADMIN — OXYCODONE AND ACETAMINOPHEN 1 TABLET(S): 5; 325 TABLET ORAL at 14:03

## 2018-08-13 RX ADMIN — Medication 1 PATCH: at 11:02

## 2018-08-13 RX ADMIN — Medication 3 MILLILITER(S): at 18:26

## 2018-08-13 RX ADMIN — Medication 166.67 MILLIGRAM(S): at 00:13

## 2018-08-13 RX ADMIN — CEFEPIME 100 MILLIGRAM(S): 1 INJECTION, POWDER, FOR SOLUTION INTRAMUSCULAR; INTRAVENOUS at 23:10

## 2018-08-13 RX ADMIN — Medication 166.67 MILLIGRAM(S): at 11:02

## 2018-08-13 RX ADMIN — Medication 25 MILLIGRAM(S): at 02:11

## 2018-08-13 RX ADMIN — HEPARIN SODIUM 5000 UNIT(S): 5000 INJECTION INTRAVENOUS; SUBCUTANEOUS at 07:05

## 2018-08-13 RX ADMIN — PIPERACILLIN AND TAZOBACTAM 200 GRAM(S): 4; .5 INJECTION, POWDER, LYOPHILIZED, FOR SOLUTION INTRAVENOUS at 09:12

## 2018-08-13 RX ADMIN — Medication 3 MILLILITER(S): at 14:36

## 2018-08-13 RX ADMIN — Medication 40 MILLIGRAM(S): at 00:13

## 2018-08-13 RX ADMIN — SODIUM CHLORIDE 360.04 MILLILITER(S): 9 INJECTION INTRAMUSCULAR; INTRAVENOUS; SUBCUTANEOUS at 22:11

## 2018-08-13 RX ADMIN — Medication 650 MILLIGRAM(S): at 09:12

## 2018-08-13 RX ADMIN — Medication 40 MILLIEQUIVALENT(S): at 23:10

## 2018-08-13 RX ADMIN — HEPARIN SODIUM 5000 UNIT(S): 5000 INJECTION INTRAVENOUS; SUBCUTANEOUS at 23:10

## 2018-08-13 RX ADMIN — HEPARIN SODIUM 5000 UNIT(S): 5000 INJECTION INTRAVENOUS; SUBCUTANEOUS at 14:36

## 2018-08-13 RX ADMIN — CEFEPIME 100 MILLIGRAM(S): 1 INJECTION, POWDER, FOR SOLUTION INTRAMUSCULAR; INTRAVENOUS at 14:08

## 2018-08-13 RX ADMIN — Medication 40 MILLIGRAM(S): at 08:13

## 2018-08-13 RX ADMIN — OXYCODONE AND ACETAMINOPHEN 1 TABLET(S): 5; 325 TABLET ORAL at 14:37

## 2018-08-13 RX ADMIN — Medication 2: at 11:47

## 2018-08-13 RX ADMIN — Medication 3 MILLILITER(S): at 23:10

## 2018-08-13 RX ADMIN — Medication 2: at 17:08

## 2018-08-13 RX ADMIN — Medication 3 MILLILITER(S): at 11:02

## 2018-08-13 RX ADMIN — Medication 3 MILLILITER(S): at 07:05

## 2018-08-13 RX ADMIN — Medication 100 MILLIGRAM(S): at 17:08

## 2018-08-13 RX ADMIN — PIPERACILLIN AND TAZOBACTAM 200 GRAM(S): 4; .5 INJECTION, POWDER, LYOPHILIZED, FOR SOLUTION INTRAVENOUS at 02:56

## 2018-08-13 RX ADMIN — Medication 650 MILLIGRAM(S): at 01:25

## 2018-08-13 RX ADMIN — Medication 1 PATCH: at 23:09

## 2018-08-13 RX ADMIN — Medication 40 MILLIEQUIVALENT(S): at 17:08

## 2018-08-13 NOTE — PROGRESS NOTE ADULT - PROBLEM SELECTOR PLAN 1
COPD exacerbation 2/2 CAP  Patient meeting 2/4 SIRS criteria (tachycardia, tachypnea) with + source (LLL infiltrate) on presentation with mild leukocytosis. Lactate negative. s/p Vanc/Zosyn x1 in ED. Lesser on differential: myoplasma TB, pulmonary embolism, malignancy.  -started 2g cefepime IVPB and doxycycline 100mg q 12hrs   -d/c'ed vancomycin and zosyn today  -f/u Bcx  -c/w leukocytosis trend

## 2018-08-13 NOTE — PROGRESS NOTE ADULT - SUBJECTIVE AND OBJECTIVE BOX
INTERVAL HPI/OVERNIGHT EVENTS:  Patient was seen and examined at bedside. Overnight patient was given vancomycin, but developed redness and itchiness and site. Vancomycin discontinued and given benadryl which resolved rash. Overnight she had been on Hiflo (50L/min, 66% O2,) and SPO2 between 88-92%.  She pulled out her IV but she was given a new IV access. She spiked a fever of 103.3F and given tylenol and ice packs. Also refused blood work this morning.     Vancomycin and zosyn discontinued. She was started on 2g cefepime IVPB and doxycycline 100mg q 12hrs.      VITAL SIGNS:  T(F): 98.7 (08-13-18 @ 13:37)  HR: 112 (08-13-18 @ 14:45)  BP: 123/65 (08-13-18 @ 14:45)  RR: 24 (08-13-18 @ 14:45)  SpO2: 92% (08-13-18 @ 14:45)  Wt(kg): --    PHYSICAL EXAM:    Constitutional:  female, obese, cooperative, on high flow NC, no accessory muscle use, able to speak in full sentences   HEENT: NCAT, anicteric, no conjunctival pallor, MMM, no oropharyngeal erythema or exudates  Lymph: No cervical or supraclavicular LAD  Respiratory: CTAB, no w/r/r appreciated on auscultation  Cardiovascular: tachycardic, regular rhythm, no m/r/g appreciated   Gastrointestinal: soft, NTND, no masses palpable, BS normal  : no fajardo in place  Extremities: Warm, well perfused, no edema, no clubbing  Neurological: AAOx3, CN II-XII grossly intact  Skin: Normal temperature, warm, dr    MEDICATIONS  (STANDING):  ALBUTerol/ipratropium for Nebulization 3 milliLiter(s) Nebulizer every 4 hours  cefepime   IVPB 2000 milliGRAM(s) IV Intermittent every 8 hours  dextrose 50% Injectable 12.5 Gram(s) IV Push once  dextrose 50% Injectable 25 Gram(s) IV Push once  dextrose 50% Injectable 25 Gram(s) IV Push once  doxycycline hyclate Capsule 100 milliGRAM(s) Oral every 12 hours  heparin  Injectable 5000 Unit(s) SubCutaneous every 8 hours  insulin lispro (HumaLOG) corrective regimen sliding scale   SubCutaneous Before meals and at bedtime  nicotine - 21 mG/24Hr(s) Patch 1 patch Transdermal daily  potassium chloride    Tablet ER 40 milliEquivalent(s) Oral every 4 hours  sodium chloride 0.9%. 1000 milliLiter(s) (100 mL/Hr) IV Continuous <Continuous>    MEDICATIONS  (PRN):  dextrose 40% Gel 15 Gram(s) Oral once PRN Blood Glucose LESS THAN 70 milliGRAM(s)/deciliter  diphenhydrAMINE   Capsule 25 milliGRAM(s) Oral every 4 hours PRN Rash and/or Itching  glucagon  Injectable 1 milliGRAM(s) IntraMuscular once PRN Glucose LESS THAN 70 milligrams/deciliter  petrolatum white Ointment 1 Application(s) Topical daily PRN Lip Dryness      Allergies    penicillin (Rash)  vancomycin (Rash)    Intolerances        LABS:                        11.1   10.2  )-----------( 210      ( 13 Aug 2018 15:57 )             33.9     08-13    132<L>  |  98  |  22  ----------------------------<  230<H>  2.9<LL>   |  19<L>  |  1.15    Ca    9.0      13 Aug 2018 15:57    TPro  8.4<H>  /  Alb  2.9<L>  /  TBili  1.5<H>  /  DBili  x   /  AST  355<H>  /  ALT  270<H>  /  AlkPhos  97  08-12    PT/INR - ( 12 Aug 2018 08:18 )   PT: 16.6 sec;   INR: 1.50          PTT - ( 12 Aug 2018 08:18 )  PTT:32.8 sec      RADIOLOGY & ADDITIONAL TESTS:  Reviewed

## 2018-08-13 NOTE — PROGRESS NOTE ADULT - SUBJECTIVE AND OBJECTIVE BOX
Patient is a 58y old  Female who presents with a chief complaint of Shortness of breath (12 Aug 2018 13:14)        INTERVAL HPI/OVERNIGHT EVENTS: sats borsserline    SYMPTOMS mild sob, no cough, pain    DRIPS none        ICU Vital Signs Last 24 Hrs  T(C): 39.6 (13 Aug 2018 09:12), Max: 39.6 (13 Aug 2018 01:22)  T(F): 103.3 (13 Aug 2018 09:12), Max: 103.3 (13 Aug 2018 01:22)  HR: 94 (13 Aug 2018 08:30) (72 - 114)  BP: 113/64 (13 Aug 2018 08:30) (97/54 - 145/71)  BP(mean): 81 (13 Aug 2018 08:30) (70 - 97)  ABP: --  ABP(mean): --  RR: 29 (13 Aug 2018 08:30) (18 - 47)  SpO2: 92% (13 Aug 2018 08:30) (88% - 99%)      I&O's Summary    12 Aug 2018 07:01  -  13 Aug 2018 07:00  --------------------------------------------------------  IN: 2200 mL / OUT: 0 mL / NET: 2200 mL    13 Aug 2018 07:01  -  13 Aug 2018 10:28  --------------------------------------------------------  IN: 400 mL / OUT: 0 mL / NET: 400 mL        EXAM    Chest decreased bs LLL    Heart rr    Abdomen soft nontender with bs    Extremities no edema    Neuro awake, alert      LABS:    ABG - ( 12 Aug 2018 15:45 )  pH: 7.41  /  pCO2: 32    /  pO2: 89    / HCO3: 19    / Base Excess: -4.4  /  SaO2: 97                                      13.0   11.4  )-----------( 181      ( 12 Aug 2018 08:18 )             39.9     08-12    134  |  99  |  16  ----------------------------<  148<H>  3.4<L>   |  20<L>  |  1.36<H>    Ca    9.1      12 Aug 2018 08:18    TPro  8.4<H>  /  Alb  2.9<L>  /  TBili  1.5<H>  /  DBili  x   /  AST  355<H>  /  ALT  270<H>  /  AlkPhos  97  08-12    PT/INR - ( 12 Aug 2018 08:18 )   PT: 16.6 sec;   INR: 1.50          PTT - ( 12 Aug 2018 08:18 )  PTT:32.8 sec          RADIOLOGY & ADDITIONAL STUDIES: no new, cultures neg to date    CRITICAL CARE TIME SPENT:

## 2018-08-13 NOTE — PROGRESS NOTE ADULT - PROBLEM SELECTOR PLAN 2
CAP Pneumonia in the setting of COPD exacerbation  History of tobacco use and COPD and first hospitalization. CXR demonstrating LLL infiltrate. Most likely CAP in the setting of COPD exacerbation, afebrile on admission, wbc count unremarkable, not septic. Less likely on differential: TB, malignancy, CHF. Malignancy cannot be excluded based on CT scan. Pulmonary embolism ruled out on CT scan.   -CXR: LLL infiltrate  - f/u blood cx  - Given prolonged QTC, not a candidate for Macrolide or FQ.   - dc'ed c/w vancomycin 1250 mg IV every 12 hours and 3.375 g zosyn q6h today  -Will consider repeat CT to exclude possibility of any malignant mass after finish course of antibiotics.   -As per pulmonalogy

## 2018-08-13 NOTE — PROGRESS NOTE ADULT - ASSESSMENT
A - acute respiratory failure/pneumonia/copd/elevated transaminases/rhabdomyolysis    Suggest:    hold acetominophen  repeat LFT, check ultrasound  if LFTs increasing screen for hepatitis  continue ABX  add coverage of atypicals  continue high flow, increase fio2 A - acute respiratory failure/pneumonia/copd/elevated transaminases/rhabdomyolysis/ CLARISSA-ATN    Suggest:    hold acetaminophen  repeat LFT, check ultrasound  if LFTs increasing screen for hepatitis  continue ABX  add coverage of atypicals  repeat bun/creat may need to modify abx  check cpk  continue high flow, increase fio2

## 2018-08-13 NOTE — CHART NOTE - NSCHARTNOTEFT_GEN_A_CORE
Infectious Diseases Anti-infective Approval Note    Medication:  Cefepime  Dose:  2 grams  Route:  IV   Frequency:  q8hrs  Duration:  3 days     Dose may be adjusted as needed for alterations in renal function.    *THIS IS NOT AN INFECTIOUS DISEASES CONSULTATION*

## 2018-08-13 NOTE — CONSULT NOTE ADULT - ASSESSMENT
This is a 57yo  female PMHx asthma, COPD, current smoker (unspecified amount of years), no hospitalizations or intubations in the past presented to [facility] ER for SOB for the past 3 days. Pulmonology consulted for COPD exacerbation management.     #COPD exacerbation  Pt has clinically improved, with no wheezing present on exam however still on high flow oxygen   - wean oxygen as tolerated  - transition to oral prednisone 40mg tomorrow for 5 days   - c/w masha, transition to stiolto tomorrow as likely primarily COPD and unlike asthma component   - will need pulm follow up as outpatient This is a 57yo  female PMHx asthma, COPD, current smoker (unspecified amount of years), no hospitalizations or intubations in the past presented to [facility] ER for SOB for the past 3 days. Pulmonology consulted for COPD exacerbation management.     #COPD exacerbation likely 2/2 PNA  Pt has clinically improved, with no wheezing present on exam however still on high flow oxygen   - c/w abx as per primary team   - wean oxygen as tolerated  - transition to oral prednisone 40mg tomorrow for 5 days   - c/w duonebs, transition to stiolto tomorrow as likely primarily COPD and unlike asthma component   - will need pulm follow up as outpatient  - OOB to chair, PT diamond This is a 57yo  female PMHx asthma, COPD, current smoker (unspecified amount of years), no hospitalizations or intubations in the past presented to [facility] ER for SOB for the past 3 days. Pulmonology consulted for COPD exacerbation management.     #COPD exacerbation likely 2/2 PNA  Pt has clinically improved, with no wheezing present on exam however still on high flow oxygen   - c/w abx as per primary team for LLL Pna  - wean oxygen as tolerated  - transition to oral prednisone 40mg tomorrow for 5 days   - c/w duonebs, transition to stiolto tomorrow as likely primarily COPD and unlike asthma component   - will need pulm follow up as outpatient  - OOB to chair, PT eval   - Encourage smoking cessation, nicotine patch as this time

## 2018-08-13 NOTE — PROGRESS NOTE ADULT - PROBLEM SELECTOR PLAN 7
Pt states she drank last Thursday a 1/2 pint of alcohol and drinks weekly. Unknown history of liver pathology.  -f/u AST/ALT, if increasing, consider an hepatitis panel  -f/u acetaminophen level  -f/u abdominal ultrasound to rule out and liver lesions  -Continue to trend LFTs with daily labs

## 2018-08-13 NOTE — CONSULT NOTE ADULT - SUBJECTIVE AND OBJECTIVE BOX
This is a 57yo  female PMHx asthma, COPD, current smoker (unspecified amount of years), no hospitalizations or intubations in the past presented to [facility] ER for SOB for the past 3 days. She states that she was in her normal state of health until a few days prior to arrival. She is usually able to walk 10 blocks and multiple flights of stairs without getting short of breath. Several days ago, she started having back pain. She denies any increase in her cough but does note that her breathing was slightly more difficult. She had subjective fever at home. Denies any chest pain, headache, weakness, lightheadedness, increase in cough, chills. The patient denies seeing a pulmonologist as an outpatient. She states that she has a rescue inhaler that she rarely uses and only if needed. She denies any prior COPD exacerbation, intubations, hospitalizations. She denies allergy symptoms, increase of symptoms with seasons changing, or increase allergies. Pulmonology consulted for COPD management.     VITAL SIGNS:  T(F): 98.7 (08-13-18 @ 13:37)  HR: 112 (08-13-18 @ 14:45)  BP: 123/65 (08-13-18 @ 14:45)  RR: 24 (08-13-18 @ 14:45)  SpO2: 92% (08-13-18 @ 14:45)  Wt(kg): --    PHYSICAL EXAM:    Constitutional: WDWN, NAD, on HFNC  Neck: supple, trachea midline, no masses, no JVD  Respiratory: no rhonchi, no rales, no wheezing, mild LLL crackles, without accessory muscle use and no intercostal retractions  Cardiovascular: RRR, normal S1S2, no M/R/G  Gastrointestinal: soft, NTND, no masses palpable, BS normal  Extremities: Warm, well perfused, pulses equal bilateral upper and lower extremities, no edema, no clubbing  Neurological: AAOx3, CN Grossly intact  Skin: Normal temperature, warm, dry    MEDICATIONS  (STANDING):  ALBUTerol/ipratropium for Nebulization 3 milliLiter(s) Nebulizer every 4 hours  cefepime   IVPB 2000 milliGRAM(s) IV Intermittent every 8 hours  dextrose 50% Injectable 12.5 Gram(s) IV Push once  dextrose 50% Injectable 25 Gram(s) IV Push once  dextrose 50% Injectable 25 Gram(s) IV Push once  heparin  Injectable 5000 Unit(s) SubCutaneous every 8 hours  insulin lispro (HumaLOG) corrective regimen sliding scale   SubCutaneous Before meals and at bedtime  nicotine - 21 mG/24Hr(s) Patch 1 patch Transdermal daily  sodium chloride 0.9%. 1000 milliLiter(s) (100 mL/Hr) IV Continuous <Continuous>    MEDICATIONS  (PRN):  dextrose 40% Gel 15 Gram(s) Oral once PRN Blood Glucose LESS THAN 70 milliGRAM(s)/deciliter  diphenhydrAMINE   Capsule 25 milliGRAM(s) Oral every 4 hours PRN Rash and/or Itching  glucagon  Injectable 1 milliGRAM(s) IntraMuscular once PRN Glucose LESS THAN 70 milligrams/deciliter  petrolatum white Ointment 1 Application(s) Topical daily PRN Lip Dryness      Allergies    penicillin (Rash)    Intolerances        LABS:                        13.0   11.4  )-----------( 181      ( 12 Aug 2018 08:18 )             39.9     08-12    134  |  99  |  16  ----------------------------<  148<H>  3.4<L>   |  20<L>  |  1.36<H>    Ca    9.1      12 Aug 2018 08:18    TPro  8.4<H>  /  Alb  2.9<L>  /  TBili  1.5<H>  /  DBili  x   /  AST  355<H>  /  ALT  270<H>  /  AlkPhos  97  08-12    PT/INR - ( 12 Aug 2018 08:18 )   PT: 16.6 sec;   INR: 1.50          PTT - ( 12 Aug 2018 08:18 )  PTT:32.8 sec      RADIOLOGY & ADDITIONAL TESTS: This is a 57yo  female PMHx asthma, COPD, current smoker (unspecified amount of years), no hospitalizations or intubations in the past presented to [facility] ER for SOB for the past 3 days. She states that she was in her normal state of health until a few days prior to arrival. She is usually able to walk 10 blocks and multiple flights of stairs without getting short of breath. Several days ago, she started having back pain. She denies any increase in her cough but does note that her breathing was slightly more difficult. She had subjective fever at home. Denies any chest pain, headache, weakness, lightheadedness, increase in cough, chills. The patient denies seeing a pulmonologist as an outpatient. She states that she has a rescue inhaler that she rarely uses and only if needed. She denies any prior COPD exacerbation, intubations, hospitalizations. She denies allergy symptoms, increase of symptoms with seasons changing, or increase allergies. Pulmonology consulted for COPD management.     CT scan shows LLL consolidation, severe emphysema    Social: smokes several cigarettes a day for 10 years    VITAL SIGNS:  T(F): 98.7 (08-13-18 @ 13:37)  HR: 112 (08-13-18 @ 14:45)  BP: 123/65 (08-13-18 @ 14:45)  RR: 24 (08-13-18 @ 14:45)  SpO2: 92% (08-13-18 @ 14:45)  Wt(kg): --    PHYSICAL EXAM:    Constitutional: WDWN, NAD, on HFNC  Neck: supple, trachea midline, no masses, no JVD  Respiratory: no rhonchi, no rales, no wheezing, mild LLL crackles, without accessory muscle use and no intercostal retractions  Cardiovascular: RRR, normal S1S2, no M/R/G  Gastrointestinal: soft, NTND, no masses palpable, BS normal  Extremities: Warm, well perfused, pulses equal bilateral upper and lower extremities, no edema, no clubbing  Neurological: AAOx3, CN Grossly intact  Skin: Normal temperature, warm, dry    MEDICATIONS  (STANDING):  ALBUTerol/ipratropium for Nebulization 3 milliLiter(s) Nebulizer every 4 hours  cefepime   IVPB 2000 milliGRAM(s) IV Intermittent every 8 hours  dextrose 50% Injectable 12.5 Gram(s) IV Push once  dextrose 50% Injectable 25 Gram(s) IV Push once  dextrose 50% Injectable 25 Gram(s) IV Push once  heparin  Injectable 5000 Unit(s) SubCutaneous every 8 hours  insulin lispro (HumaLOG) corrective regimen sliding scale   SubCutaneous Before meals and at bedtime  nicotine - 21 mG/24Hr(s) Patch 1 patch Transdermal daily  sodium chloride 0.9%. 1000 milliLiter(s) (100 mL/Hr) IV Continuous <Continuous>    MEDICATIONS  (PRN):  dextrose 40% Gel 15 Gram(s) Oral once PRN Blood Glucose LESS THAN 70 milliGRAM(s)/deciliter  diphenhydrAMINE   Capsule 25 milliGRAM(s) Oral every 4 hours PRN Rash and/or Itching  glucagon  Injectable 1 milliGRAM(s) IntraMuscular once PRN Glucose LESS THAN 70 milligrams/deciliter  petrolatum white Ointment 1 Application(s) Topical daily PRN Lip Dryness      Allergies    penicillin (Rash)    Intolerances        LABS:                        13.0   11.4  )-----------( 181      ( 12 Aug 2018 08:18 )             39.9     08-12    134  |  99  |  16  ----------------------------<  148<H>  3.4<L>   |  20<L>  |  1.36<H>    Ca    9.1      12 Aug 2018 08:18    TPro  8.4<H>  /  Alb  2.9<L>  /  TBili  1.5<H>  /  DBili  x   /  AST  355<H>  /  ALT  270<H>  /  AlkPhos  97  08-12    PT/INR - ( 12 Aug 2018 08:18 )   PT: 16.6 sec;   INR: 1.50          PTT - ( 12 Aug 2018 08:18 )  PTT:32.8 sec      RADIOLOGY & ADDITIONAL TESTS:

## 2018-08-13 NOTE — PROGRESS NOTE ADULT - PROBLEM SELECTOR PLAN 3
Patient p/w progressive SOB, requiring frequent resscue inhaler at home, admitted for COPD exacerbation 2/2 CAP (LLL infiltrate on CXR). Lungs clear without wheezes. s/p multiple runs of duonebs, [id number], SoluMedrol 125 mg IVP x1 in ED, transferred to [facility] from [facility] withi non-rebreather mask. Switched to HFNC upon arrival, saturating well.  -keep SpO2 between 88-92%  -will start 40mg prednisone po for 5 days, dc'ed IV solumedrol today  -felix start stiolto tomorrow  -c/w HIFLO NC and wean as tolerated  -c/w duonebs  -s/p SoluMedrol 125 mg IVP x1 in ED  -No known history of DM, ISS while on steroids  -pt will need to f/u with outpatient pulmonology upon discharge

## 2018-08-14 LAB
ALBUMIN SERPL ELPH-MCNC: 2.6 G/DL — LOW (ref 3.3–5)
ALP SERPL-CCNC: 87 U/L — SIGNIFICANT CHANGE UP (ref 40–120)
ALT FLD-CCNC: 189 U/L — HIGH (ref 10–45)
ANION GAP SERPL CALC-SCNC: 15 MMOL/L — SIGNIFICANT CHANGE UP (ref 5–17)
AST SERPL-CCNC: 158 U/L — HIGH (ref 10–40)
BASE EXCESS BLDA CALC-SCNC: -3.3 MMOL/L — LOW (ref -2–3)
BILIRUB SERPL-MCNC: 1.7 MG/DL — HIGH (ref 0.2–1.2)
BUN SERPL-MCNC: 17 MG/DL — SIGNIFICANT CHANGE UP (ref 7–23)
CALCIUM SERPL-MCNC: 8.7 MG/DL — SIGNIFICANT CHANGE UP (ref 8.4–10.5)
CHLORIDE SERPL-SCNC: 102 MMOL/L — SIGNIFICANT CHANGE UP (ref 96–108)
CK SERPL-CCNC: 3788 U/L — HIGH (ref 25–170)
CO2 SERPL-SCNC: 18 MMOL/L — LOW (ref 22–31)
CREAT SERPL-MCNC: 0.99 MG/DL — SIGNIFICANT CHANGE UP (ref 0.5–1.3)
GAS PNL BLDA: SIGNIFICANT CHANGE UP
GLUCOSE BLDC GLUCOMTR-MCNC: 123 MG/DL — HIGH (ref 70–99)
GLUCOSE BLDC GLUCOMTR-MCNC: 131 MG/DL — HIGH (ref 70–99)
GLUCOSE BLDC GLUCOMTR-MCNC: 133 MG/DL — HIGH (ref 70–99)
GLUCOSE BLDC GLUCOMTR-MCNC: 97 MG/DL — SIGNIFICANT CHANGE UP (ref 70–99)
GLUCOSE SERPL-MCNC: 121 MG/DL — HIGH (ref 70–99)
HAV IGM SER-ACNC: SIGNIFICANT CHANGE UP
HAV IGM SER-ACNC: SIGNIFICANT CHANGE UP
HBV CORE IGM SER-ACNC: SIGNIFICANT CHANGE UP
HBV CORE IGM SER-ACNC: SIGNIFICANT CHANGE UP
HBV SURFACE AG SER-ACNC: SIGNIFICANT CHANGE UP
HBV SURFACE AG SER-ACNC: SIGNIFICANT CHANGE UP
HCO3 BLDA-SCNC: 18 MMOL/L — LOW (ref 21–28)
HCT VFR BLD CALC: 31.7 % — LOW (ref 34.5–45)
HCV AB S/CO SERPL IA: 0.04 S/CO — SIGNIFICANT CHANGE UP
HCV AB S/CO SERPL IA: 0.05 S/CO — SIGNIFICANT CHANGE UP
HCV AB SERPL-IMP: SIGNIFICANT CHANGE UP
HCV AB SERPL-IMP: SIGNIFICANT CHANGE UP
HGB BLD-MCNC: 10.4 G/DL — LOW (ref 11.5–15.5)
HIV 1+2 AB+HIV1 P24 AG SERPL QL IA: SIGNIFICANT CHANGE UP
HIV 1+2 AB+HIV1 P24 AG SERPL QL IA: SIGNIFICANT CHANGE UP
LACTATE SERPL-SCNC: 1.1 MMOL/L — SIGNIFICANT CHANGE UP (ref 0.5–2)
LEGIONELLA AG UR QL: POSITIVE
MCHC RBC-ENTMCNC: 25.4 PG — LOW (ref 27–34)
MCHC RBC-ENTMCNC: 32.8 G/DL — SIGNIFICANT CHANGE UP (ref 32–36)
MCV RBC AUTO: 77.3 FL — LOW (ref 80–100)
PCO2 BLDA: 22 MMHG — LOW (ref 32–45)
PH BLDA: 7.53 — HIGH (ref 7.35–7.45)
PLATELET # BLD AUTO: 204 K/UL — SIGNIFICANT CHANGE UP (ref 150–400)
PO2 BLDA: 55 MMHG — CRITICAL LOW (ref 83–108)
POTASSIUM SERPL-MCNC: 3.2 MMOL/L — LOW (ref 3.5–5.3)
POTASSIUM SERPL-SCNC: 3.2 MMOL/L — LOW (ref 3.5–5.3)
PROT SERPL-MCNC: 6.5 G/DL — SIGNIFICANT CHANGE UP (ref 6–8.3)
RBC # BLD: 4.1 M/UL — SIGNIFICANT CHANGE UP (ref 3.8–5.2)
RBC # FLD: 14.7 % — SIGNIFICANT CHANGE UP (ref 10.3–16.9)
SAO2 % BLDA: 89 % — LOW (ref 95–100)
SODIUM SERPL-SCNC: 135 MMOL/L — SIGNIFICANT CHANGE UP (ref 135–145)
WBC # BLD: 9.6 K/UL — SIGNIFICANT CHANGE UP (ref 3.8–10.5)
WBC # FLD AUTO: 9.6 K/UL — SIGNIFICANT CHANGE UP (ref 3.8–10.5)

## 2018-08-14 PROCEDURE — 71045 X-RAY EXAM CHEST 1 VIEW: CPT | Mod: 26,76

## 2018-08-14 PROCEDURE — 94010 BREATHING CAPACITY TEST: CPT | Mod: 26

## 2018-08-14 RX ORDER — SODIUM CHLORIDE 9 MG/ML
1000 INJECTION INTRAMUSCULAR; INTRAVENOUS; SUBCUTANEOUS ONCE
Qty: 0 | Refills: 0 | Status: COMPLETED | OUTPATIENT
Start: 2018-08-14 | End: 2018-08-14

## 2018-08-14 RX ORDER — POTASSIUM CHLORIDE 20 MEQ
20 PACKET (EA) ORAL ONCE
Qty: 0 | Refills: 0 | Status: COMPLETED | OUTPATIENT
Start: 2018-08-14 | End: 2018-08-14

## 2018-08-14 RX ORDER — KETOROLAC TROMETHAMINE 30 MG/ML
15 SYRINGE (ML) INJECTION ONCE
Qty: 0 | Refills: 0 | Status: DISCONTINUED | OUTPATIENT
Start: 2018-08-14 | End: 2018-08-14

## 2018-08-14 RX ORDER — ACETAMINOPHEN 500 MG
650 TABLET ORAL EVERY 6 HOURS
Qty: 0 | Refills: 0 | Status: DISCONTINUED | OUTPATIENT
Start: 2018-08-14 | End: 2018-08-16

## 2018-08-14 RX ORDER — HALOPERIDOL DECANOATE 100 MG/ML
2.5 INJECTION INTRAMUSCULAR ONCE
Qty: 0 | Refills: 0 | Status: DISCONTINUED | OUTPATIENT
Start: 2018-08-14 | End: 2018-08-15

## 2018-08-14 RX ORDER — OXYCODONE AND ACETAMINOPHEN 5; 325 MG/1; MG/1
2 TABLET ORAL EVERY 4 HOURS
Qty: 0 | Refills: 0 | Status: DISCONTINUED | OUTPATIENT
Start: 2018-08-14 | End: 2018-08-15

## 2018-08-14 RX ORDER — ACETAMINOPHEN 500 MG
650 TABLET ORAL ONCE
Qty: 0 | Refills: 0 | Status: COMPLETED | OUTPATIENT
Start: 2018-08-14 | End: 2018-08-14

## 2018-08-14 RX ORDER — POTASSIUM CHLORIDE 20 MEQ
40 PACKET (EA) ORAL ONCE
Qty: 0 | Refills: 0 | Status: COMPLETED | OUTPATIENT
Start: 2018-08-14 | End: 2018-08-14

## 2018-08-14 RX ADMIN — SODIUM CHLORIDE 3603.6 MILLILITER(S): 9 INJECTION INTRAMUSCULAR; INTRAVENOUS; SUBCUTANEOUS at 05:15

## 2018-08-14 RX ADMIN — Medication 3 MILLILITER(S): at 14:40

## 2018-08-14 RX ADMIN — Medication 650 MILLIGRAM(S): at 23:57

## 2018-08-14 RX ADMIN — Medication 3 MILLILITER(S): at 10:54

## 2018-08-14 RX ADMIN — Medication 15 MILLIGRAM(S): at 04:11

## 2018-08-14 RX ADMIN — HEPARIN SODIUM 5000 UNIT(S): 5000 INJECTION INTRAVENOUS; SUBCUTANEOUS at 14:40

## 2018-08-14 RX ADMIN — Medication 1 PATCH: at 11:29

## 2018-08-14 RX ADMIN — Medication 20 MILLIEQUIVALENT(S): at 06:27

## 2018-08-14 RX ADMIN — Medication 1 PATCH: at 11:30

## 2018-08-14 RX ADMIN — Medication 15 MILLIGRAM(S): at 04:25

## 2018-08-14 RX ADMIN — HEPARIN SODIUM 5000 UNIT(S): 5000 INJECTION INTRAVENOUS; SUBCUTANEOUS at 06:27

## 2018-08-14 RX ADMIN — Medication 650 MILLIGRAM(S): at 17:49

## 2018-08-14 RX ADMIN — Medication 3 MILLILITER(S): at 18:44

## 2018-08-14 RX ADMIN — Medication 650 MILLIGRAM(S): at 00:23

## 2018-08-14 RX ADMIN — Medication 3 MILLILITER(S): at 06:27

## 2018-08-14 RX ADMIN — CEFEPIME 100 MILLIGRAM(S): 1 INJECTION, POWDER, FOR SOLUTION INTRAMUSCULAR; INTRAVENOUS at 06:26

## 2018-08-14 RX ADMIN — Medication 40 MILLIGRAM(S): at 06:26

## 2018-08-14 RX ADMIN — TIOTROPIUM BROMIDE AND OLODATEROL 2 PUFF(S): 3.124; 2.736 SPRAY, METERED RESPIRATORY (INHALATION) at 13:01

## 2018-08-14 RX ADMIN — Medication 100 MILLIGRAM(S): at 06:26

## 2018-08-14 RX ADMIN — OXYCODONE AND ACETAMINOPHEN 2 TABLET(S): 5; 325 TABLET ORAL at 11:39

## 2018-08-14 RX ADMIN — OXYCODONE AND ACETAMINOPHEN 2 TABLET(S): 5; 325 TABLET ORAL at 10:54

## 2018-08-14 RX ADMIN — Medication 40 MILLIEQUIVALENT(S): at 17:35

## 2018-08-14 NOTE — PROGRESS NOTE ADULT - PROBLEM SELECTOR PLAN 7
(Resolved)  -QTc today 435, prolonged QTc 521 on admission  -hold off any QT prolonging meds, including Azithromycin  -Continue to monitor

## 2018-08-14 NOTE — PROGRESS NOTE ADULT - PROBLEM SELECTOR PLAN 3
Patient p/w progressive SOB, requiring frequent rescue inhaler at home, admitted for COPD exacerbation 2/2 CAP (LLL infiltrate on CXR). Lungs clear without wheezes. s/p multiple runs of duonebs, [id number], SoluMedrol 125 mg IVP x1 in ED, transferred to [facility] from [facility] withi non-rebreather mask. Switched to HFNC upon arrival, saturating well.  -started 40mg prednisone po for 5 days (day 1/5)  -started stiolto today  -c/w HIFLO NC and wean as tolerated, keep SpO2 between 88-92%  -c/w duonebs  -pt will need to f/u with outpatient pulmonology upon discharge

## 2018-08-14 NOTE — PHYSICAL THERAPY INITIAL EVALUATION ADULT - DISCHARGE DISPOSITION, PT EVAL
Outpatient PT pending progress and further functional performance (limited by HFNC)/home w/ outpatient services

## 2018-08-14 NOTE — PROGRESS NOTE ADULT - PROBLEM SELECTOR PLAN 1
Severe sepsis 2/2 to CAP in setting of COPD  Septic on admission, given Vanc/Zosyn x1 in ED. Lesser on differential: myoplasma TB, pulmonary embolism, malignancy. No leukocytosis since admission.  -started on levaquin 750 mg IVPB (Day 3 of antibiotics)  -d/c'ed 2g cefepime IVPB for 2 days and doxycycline 100mg q 12hrs (2nd day of antibiotic treatment)  -s/p 2 doses of vancomycin and zosyn   -1st Bcx set negative since 8/12, f/u 2nd blood cx  -c/w leukocytosis trend Severe sepsis 2/2 to CAP in setting of COPD  Septic on admission, given Vanc/Zosyn x1 in ED. Lesser on differential: myoplasma TB, pulmonary embolism, malignancy. No leukocytosis since admission. Today is day 3 of 7 of tentative antibiotic regimen.   -started on levaquin 750 mg IVPB (Day 3 of antibiotics)  -d/c'ed 2g cefepime IVPB for 2 days and doxycycline 100mg q 12hrs (2nd day of antibiotic treatment)  -s/p 2 doses of vancomycin and zosyn   -c/w leukocytosis trend  -c/w with daily CXRs  -2 sets of blood cx negative so far  -if afebrile for 24hrs, blood cxs negative, then pt may benefit from a PICC line.

## 2018-08-14 NOTE — PROGRESS NOTE ADULT - SUBJECTIVE AND OBJECTIVE BOX
INTERVAL HPI/OVERNIGHT EVENTS:  Overnight, pt was severely septic. Febrile with temp of 104.6, tachycardic 120s,  tachypneic 30s, /53, SpO2 88% on HFNC (65%). Her FiO2 was adjusted to 80%.  She was given tylenol restarted, ice packs and cold blanket for the fever. Blood cultures drawn and given IVF bolus. CXR this morning showed worsening infiltrates. ABG showed respiratory alkalosis, lactate: 2.7.    This morning, pt reported having stomach pain a couple minutes after taking doxycycline. Decision was made to stop cefepime and doxycycline. She was started on levaquin  750 mg IVPB. HFNC was reset to FiO2 40% and FiO2 40L after a trial of weaning to nasal cannula.       VITAL SIGNS:  T(F): 98.9 (08-14-18 @ 13:36)  HR: 100 (08-14-18 @ 14:30)  BP: 149/83 (08-14-18 @ 14:30)  RR: 32 (08-14-18 @ 14:30)  SpO2: 87% (08-14-18 @ 14:30)  Wt(kg): --    PHYSICAL EXAM:    Constitutional:  female, obese, cooperative, on high flow NC, no accessory muscle use, able to speak in full sentences   HEENT: NCAT, MMM  Lymph: No cervical or supraclavicular LAD  Respiratory: + crackles bilaterally, no w/r/r   Cardiovascular: sinus tachycardic, regular rhythm, no m/r/g appreciated   Gastrointestinal: soft, NTND, no masses palpable, BS normal  : no fajardo in place  Extremities: Warm, well perfused, no edema, no clubbing  Neurological: AAOx3, CN II-XII grossly intact  Skin: Normal temperature, warm, dr    MEDICATIONS  (STANDING):  ALBUTerol/ipratropium for Nebulization 3 milliLiter(s) Nebulizer every 4 hours  dextrose 50% Injectable 12.5 Gram(s) IV Push once  dextrose 50% Injectable 25 Gram(s) IV Push once  dextrose 50% Injectable 25 Gram(s) IV Push once  heparin  Injectable 5000 Unit(s) SubCutaneous every 8 hours  insulin lispro (HumaLOG) corrective regimen sliding scale   SubCutaneous Before meals and at bedtime  levoFLOXacin IVPB 750 milliGRAM(s) IV Intermittent every 24 hours  nicotine - 21 mG/24Hr(s) Patch 1 patch Transdermal daily  predniSONE   Tablet 40 milliGRAM(s) Oral daily  sodium chloride 0.9%. 1000 milliLiter(s) (100 mL/Hr) IV Continuous <Continuous>  tiotropium 2.5 MICROgram(s)/olodaterol 2.5 MICROgram(s) (STIOLTO) Inhaler 2 Puff(s) Inhalation daily    MEDICATIONS  (PRN):  dextrose 40% Gel 15 Gram(s) Oral once PRN Blood Glucose LESS THAN 70 milliGRAM(s)/deciliter  diphenhydrAMINE   Capsule 25 milliGRAM(s) Oral every 4 hours PRN Rash and/or Itching  glucagon  Injectable 1 milliGRAM(s) IntraMuscular once PRN Glucose LESS THAN 70 milligrams/deciliter  oxyCODONE    5 mG/acetaminophen 325 mG 2 Tablet(s) Oral every 4 hours PRN Severe Pain (7 - 10)  petrolatum white Ointment 1 Application(s) Topical daily PRN Lip Dryness      Allergies    penicillin (Rash)  vancomycin (Rash)    Intolerances        LABS:                        10.4   9.6   )-----------( 204      ( 14 Aug 2018 06:02 )             31.7     08-14    135  |  102  |  17  ----------------------------<  121<H>  3.2<L>   |  18<L>  |  0.99    Ca    8.7      14 Aug 2018 06:02  Mg     2.3     08-13    TPro  6.5  /  Alb  2.6<L>  /  TBili  1.7<H>  /  DBili  x   /  AST  158<H>  /  ALT  189<H>  /  AlkPhos  87  08-14          RADIOLOGY & ADDITIONAL TESTS:  Reviewed

## 2018-08-14 NOTE — PROGRESS NOTE ADULT - ASSESSMENT
This is a 57yo  female PMHx asthma, COPD, current smoker (unspecified amount of years), no hospitalizations or intubations in the past presented to [facility] ER for SOB for the past 3 days. Pulmonology consulted for COPD exacerbation management.     #COPD exacerbation likely 2/2 PNA  Overnight patient required increased O2 requirements likely 2/2 febrile and tachypneic. Was on 50L and 82% FiO2  Pt has clinically improved, with no wheezing present on exam however still on high flow oxygen   - c/w abx as per primary team for LLL Pna  - wean oxygen as tolerated, attempt NC in afternoon if patient stable   - C/W oral prednisone 40mg for 5 days   - Start Stiolto Today. SOB likely primarily COPD component and therefore will benefit from LAMA/LABA combination. Less likely component of asthma and therefore no need for inhaled corticosteroids at this time - will need pulm follow up as outpatient  - start to wean duonebs, can make prn   - OOB to chair, PT daily for COPD bundle   - C/W nicotine Patch    WILL DISCUSS WITH FELLOW AND ATTENDING

## 2018-08-14 NOTE — PROGRESS NOTE ADULT - SUBJECTIVE AND OBJECTIVE BOX
INTERVAL HPI/OVERNIGHT EVENTS:  Patient was seen and examined at bedside. Overnight, that patient spiked a fever to 104.6, tachypneic and required increased oxygen requirements on HFNC to 82% saturation. That patient states that she did not feel short of breath during this time nor did she have increased cough. This morning, the pt was resting comfortably. She denies any difficulty breathing or worsening of her shortness of breath, however states that she has pain in the middle of her chest that limits her taking a deep breath. Patient denies: fever, chills, dizziness, weakness, HA, Changes in vision, palpitations, N/V/D/C, dysuria, changes in bowel movements, LE edema.    This AM: attempted to wean off HFNC. Patient was saturating 88-89% on 6L NC with no shortness of breath or difficulty breathing. However given that the patient is requiring more oxygen and will be eating/talking, was placed back on HFNC at lower FiO2- 40% FIO2, 40L    VITAL SIGNS:  T(F): 97.7 (08-14-18 @ 06:00)  HR: 92 (08-14-18 @ 08:20)  BP: 101/58 (08-14-18 @ 08:20)  RR: 26 (08-14-18 @ 08:20)  SpO2: 92% (08-14-18 @ 08:20)  Wt(kg): --    PHYSICAL EXAM:    Constitutional: WDWN, NAD  Eyes: PERRL, EOMI, sclera non-icteric  Neck: supple, trachea midline, no masses, no JVD  Respiratory: No increased respiratory effort, CTAB, good air entry b/l, no wheezing, no rhonchi, no rales, without accessory muscle use and no intercostal retractions  Cardiovascular: RRR, normal S1S2, no M/R/G  Gastrointestinal: soft, NTND, no masses palpable, BS normal  Extremities: Warm, well perfused, pulses equal bilateral upper and lower extremities, no edema, no clubbing  Neurological: AAOx3, CN Grossly intact  Skin: Normal temperature, warm, dry    MEDICATIONS  (STANDING):  ALBUTerol/ipratropium for Nebulization 3 milliLiter(s) Nebulizer every 4 hours  cefepime   IVPB 2000 milliGRAM(s) IV Intermittent every 8 hours  dextrose 50% Injectable 12.5 Gram(s) IV Push once  dextrose 50% Injectable 25 Gram(s) IV Push once  dextrose 50% Injectable 25 Gram(s) IV Push once  doxycycline hyclate Capsule 100 milliGRAM(s) Oral every 12 hours  heparin  Injectable 5000 Unit(s) SubCutaneous every 8 hours  insulin lispro (HumaLOG) corrective regimen sliding scale   SubCutaneous Before meals and at bedtime  nicotine - 21 mG/24Hr(s) Patch 1 patch Transdermal daily  predniSONE   Tablet 40 milliGRAM(s) Oral daily  sodium chloride 0.9%. 1000 milliLiter(s) (100 mL/Hr) IV Continuous <Continuous>  tiotropium 2.5 MICROgram(s)/olodaterol 2.5 MICROgram(s) (STIOLTO) Inhaler 2 Puff(s) Inhalation daily    MEDICATIONS  (PRN):  dextrose 40% Gel 15 Gram(s) Oral once PRN Blood Glucose LESS THAN 70 milliGRAM(s)/deciliter  diphenhydrAMINE   Capsule 25 milliGRAM(s) Oral every 4 hours PRN Rash and/or Itching  glucagon  Injectable 1 milliGRAM(s) IntraMuscular once PRN Glucose LESS THAN 70 milligrams/deciliter  petrolatum white Ointment 1 Application(s) Topical daily PRN Lip Dryness      Allergies    penicillin (Rash)  vancomycin (Rash)    Intolerances        LABS:                        10.4   9.6   )-----------( 204      ( 14 Aug 2018 06:02 )             31.7     08-14    135  |  102  |  17  ----------------------------<  121<H>  3.2<L>   |  18<L>  |  0.99    Ca    8.7      14 Aug 2018 06:02  Mg     2.3     08-13    TPro  6.5  /  Alb  2.6<L>  /  TBili  1.7<H>  /  DBili  x   /  AST  158<H>  /  ALT  189<H>  /  AlkPhos  87  08-14          RADIOLOGY & ADDITIONAL TESTS:

## 2018-08-14 NOTE — CHART NOTE - NSCHARTNOTEFT_GEN_A_CORE
Patient febrile to 104.6 after 4 hours with ice packs and 1 hour under cooling blanket. Transaminitis noted on 8pm labs with acetaminophen level <15. Transaminitis likely acute phase reactant in setting of bactermia. Risk vs benefits of tylenol administration considered. Tylenol 650 given. At around 1130 Patient was noted to be febrile to 104.6  P122 tachypneic to 30s, desaturating to 88% HFNC (65%), after 4 hours with ice packs and 1 hour under cooling blanket.     Subjective: Patient complaining of pleuritic pain in the back  Physical Exam: AAOx3, mildly anxious, tachycardic, no rubs murmurs or gallops, course breath sounds in the LLL, no lower extremity edema, extremities warm and well perfused, pulses 2+ radial and posterior tibial arteries.    Surveillance blood cultures drawn, with repeat labs drawn. HFNC FiO2 increased 65% >>78% Dr. Valdez made aware.  Transaminitis noted on 8pm labs with acetaminophen level <15. Transaminitis likely acute phase reactant in setting of bacteremia. Risk vs benefits of tylenol administration considered. Tylenol 650 given.   ABG with an acute respiratory alkalosis with decreased pO2 (55). Lactate 2.7. Patient started on IVF bolus.     Fever defervesced with vitals at 0500 97.6 P91 /71 94% R25 HFNC 78% At around 1130 Patient was noted to be febrile to 104.6  P122 tachypneic to 30s, desaturating to 88% HFNC (65%), after 4 hours with ice packs and 1 hour under cooling blanket.     Subjective: Patient complaining of pleuritic pain in the back  Physical Exam: AAOx3, mildly anxious, tachycardic, no rubs murmurs or gallops, course breath sounds in the LLL, no lower extremity edema, extremities warm and well perfused, pulses 2+ radial and posterior tibial arteries.    Surveillance blood cultures drawn, with repeat labs drawn. HFNC FiO2 increased 65% >>78% Dr. Valdez made aware.  Transaminitis noted on 8pm labs with acetaminophen level <15. Transaminitis likely acute phase reactant in setting of bacteremia. Risk vs benefits of tylenol administration considered. Tylenol 650 given. STAT CXR ordered.  ABG with an acute respiratory alkalosis with decreased pO2 (55). Lactate 2.7. Patient started on IVF bolus.     Fever defervesced with vitals at 0500 T97.6 P91 /71 R25 94% on HFNC At around 1130 Patient was noted to be febrile to 104.6  /53 P122 tachypneic to 30s, desaturating to 88% HFNC (65%), after 4 hours with ice packs and 1 hour under cooling blanket.     Subjective: Patient complaining of pleuritic pain in the back  Physical Exam: AAOx3, mildly anxious, tachycardic, no rubs murmurs or gallops, course breath sounds in the LLL, no lower extremity edema, extremities warm and well perfused, pulses 2+ radial and posterior tibial arteries.    Surveillance blood cultures drawn, with repeat labs drawn. HFNC FiO2 increased 65% >>78% Dr. Valdez made aware.  Transaminitis noted on 8pm labs with acetaminophen level <15. Transaminitis likely acute phase reactant in setting of bacteremia. Risk vs benefits of tylenol administration considered. Tylenol 650 given. STAT CXR ordered.  ABG with an acute respiratory alkalosis with decreased pO2 (55). Lactate 2.7. Patient started on IVF bolus.     Fever defervesced with vitals at 0500 T97.6 P91 /71 R25 94% on HFNC

## 2018-08-14 NOTE — PROGRESS NOTE ADULT - PROBLEM SELECTOR PLAN 5
Pt noted to have elevated CK, unclear etiology. No recent falls. (Increasing). Most likely in the setting of worsening sepsis.   -[id number]  -f/u CK levels  -f/u UA, myoglobin

## 2018-08-14 NOTE — PHYSICAL THERAPY INITIAL EVALUATION ADULT - PERTINENT HX OF CURRENT PROBLEM, REHAB EVAL
59yo  female PMHx asthma, COPD, current smoker unspecified amount of PPD), no hospitalizations or intubations presents with SOB, nonpurulent cough for a few days, found to have a LLL infiltrate, admitted and managed for COPD exacerbation 2/2 sepsis in the setting of CAP.

## 2018-08-14 NOTE — PHYSICAL THERAPY INITIAL EVALUATION ADULT - CRITERIA FOR SKILLED THERAPEUTIC INTERVENTIONS
functional limitations in following categories/risk reduction/prevention/therapy frequency/anticipated discharge recommendation/impairments found/rehab potential/predicted duration of therapy intervention

## 2018-08-14 NOTE — PHYSICAL THERAPY INITIAL EVALUATION ADULT - DIAGNOSIS, PT EVAL
6E: Impaired Ventilation and Respiration/Gas Exchange Associated with Ventilatory Pump Dysfunction or Failure, 6B: Impaired Aerobic Capacity/Endurance Associated with Deconditioning

## 2018-08-14 NOTE — PHYSICAL THERAPY INITIAL EVALUATION ADULT - ADDITIONAL COMMENTS
Pt lives in elevator building with younger children, no stairs. At baseline, pt can walk multiple miles without limitation. Reports owning cane 2/2 recent R knee sx in 2018 which still bothers her ('my bone pops out of place').

## 2018-08-14 NOTE — PROGRESS NOTE ADULT - ASSESSMENT
59yo  female PMHx asthma, COPD, current smoker unspecified amount of PPD), no hospitalizations or intubations presents with SOB, nonpurulent cough for a few days, found to have a LLL infiltrate, admitted and managed for severe sepsis 2/2 to CAP in the setting of COPD.

## 2018-08-14 NOTE — PHYSICAL THERAPY INITIAL EVALUATION ADULT - GENERAL OBSERVATIONS, REHAB EVAL
Pt rcvd sitting up in bed, +tele, +rectal probe, on cooling blanket (not active), +L IV, +HFNC 40LMP 40% FiO2 agreeable to PT. Pt denies SOB at rest, with dyspnea on talking. Demo supervision bed mob, CG transfers, side steps CG with some unsteadiness. Pt left as found, +callbell in NAD, FIM gait=1 with RN aware. SpO2 to 82% with standing marching ~15 seconds, resolved with rest and pursed lipped breathing.

## 2018-08-15 LAB
ALBUMIN SERPL ELPH-MCNC: 2.6 G/DL — LOW (ref 3.3–5)
ALP SERPL-CCNC: 94 U/L — SIGNIFICANT CHANGE UP (ref 40–120)
ALT FLD-CCNC: 141 U/L — HIGH (ref 10–45)
ANION GAP SERPL CALC-SCNC: 13 MMOL/L — SIGNIFICANT CHANGE UP (ref 5–17)
ANION GAP SERPL CALC-SCNC: 13 MMOL/L — SIGNIFICANT CHANGE UP (ref 5–17)
ANION GAP SERPL CALC-SCNC: 15 MMOL/L — SIGNIFICANT CHANGE UP (ref 5–17)
APPEARANCE UR: CLEAR — SIGNIFICANT CHANGE UP
AST SERPL-CCNC: 93 U/L — HIGH (ref 10–40)
BASE EXCESS BLDA CALC-SCNC: -7.9 MMOL/L — LOW (ref -2–3)
BILIRUB SERPL-MCNC: 1.6 MG/DL — HIGH (ref 0.2–1.2)
BILIRUB UR-MCNC: ABNORMAL
BUN SERPL-MCNC: 14 MG/DL — SIGNIFICANT CHANGE UP (ref 7–23)
BUN SERPL-MCNC: 15 MG/DL — SIGNIFICANT CHANGE UP (ref 7–23)
BUN SERPL-MCNC: 19 MG/DL — SIGNIFICANT CHANGE UP (ref 7–23)
CALCIUM SERPL-MCNC: 8.3 MG/DL — LOW (ref 8.4–10.5)
CALCIUM SERPL-MCNC: 8.8 MG/DL — SIGNIFICANT CHANGE UP (ref 8.4–10.5)
CALCIUM SERPL-MCNC: 9.4 MG/DL — SIGNIFICANT CHANGE UP (ref 8.4–10.5)
CHLORIDE SERPL-SCNC: 104 MMOL/L — SIGNIFICANT CHANGE UP (ref 96–108)
CHLORIDE SERPL-SCNC: 106 MMOL/L — SIGNIFICANT CHANGE UP (ref 96–108)
CHLORIDE SERPL-SCNC: 107 MMOL/L — SIGNIFICANT CHANGE UP (ref 96–108)
CK SERPL-CCNC: 5197 U/L — HIGH (ref 25–170)
CK SERPL-CCNC: 5423 U/L — HIGH (ref 25–170)
CK SERPL-CCNC: SIGNIFICANT CHANGE UP U/L (ref 25–170)
CO2 SERPL-SCNC: 17 MMOL/L — LOW (ref 22–31)
CO2 SERPL-SCNC: 19 MMOL/L — LOW (ref 22–31)
CO2 SERPL-SCNC: 20 MMOL/L — LOW (ref 22–31)
COLOR SPEC: YELLOW — SIGNIFICANT CHANGE UP
CREAT SERPL-MCNC: 0.75 MG/DL — SIGNIFICANT CHANGE UP (ref 0.5–1.3)
CREAT SERPL-MCNC: 0.84 MG/DL — SIGNIFICANT CHANGE UP (ref 0.5–1.3)
CREAT SERPL-MCNC: 0.87 MG/DL — SIGNIFICANT CHANGE UP (ref 0.5–1.3)
DIFF PNL FLD: ABNORMAL
GAS PNL BLDA: SIGNIFICANT CHANGE UP
GAS PNL BLDV: SIGNIFICANT CHANGE UP
GLUCOSE BLDC GLUCOMTR-MCNC: 145 MG/DL — HIGH (ref 70–99)
GLUCOSE BLDC GLUCOMTR-MCNC: 81 MG/DL — SIGNIFICANT CHANGE UP (ref 70–99)
GLUCOSE BLDC GLUCOMTR-MCNC: 84 MG/DL — SIGNIFICANT CHANGE UP (ref 70–99)
GLUCOSE BLDC GLUCOMTR-MCNC: 92 MG/DL — SIGNIFICANT CHANGE UP (ref 70–99)
GLUCOSE SERPL-MCNC: 153 MG/DL — HIGH (ref 70–99)
GLUCOSE SERPL-MCNC: 77 MG/DL — SIGNIFICANT CHANGE UP (ref 70–99)
GLUCOSE SERPL-MCNC: 98 MG/DL — SIGNIFICANT CHANGE UP (ref 70–99)
GLUCOSE UR QL: NEGATIVE — SIGNIFICANT CHANGE UP
HCO3 BLDA-SCNC: 18 MMOL/L — LOW (ref 21–28)
HCT VFR BLD CALC: 32.2 % — LOW (ref 34.5–45)
HGB BLD-MCNC: 10.4 G/DL — LOW (ref 11.5–15.5)
KETONES UR-MCNC: NEGATIVE — SIGNIFICANT CHANGE UP
LACTATE SERPL-SCNC: 1.2 MMOL/L — SIGNIFICANT CHANGE UP (ref 0.5–2)
LEUKOCYTE ESTERASE UR-ACNC: NEGATIVE — SIGNIFICANT CHANGE UP
LYMPHOCYTES # BLD AUTO: 15 % — SIGNIFICANT CHANGE UP (ref 13–44)
MAGNESIUM SERPL-MCNC: 2 MG/DL — SIGNIFICANT CHANGE UP (ref 1.6–2.6)
MAGNESIUM SERPL-MCNC: 2 MG/DL — SIGNIFICANT CHANGE UP (ref 1.6–2.6)
MAGNESIUM SERPL-MCNC: 2.2 MG/DL — SIGNIFICANT CHANGE UP (ref 1.6–2.6)
MCHC RBC-ENTMCNC: 26.4 PG — LOW (ref 27–34)
MCHC RBC-ENTMCNC: 32.3 G/DL — SIGNIFICANT CHANGE UP (ref 32–36)
MCV RBC AUTO: 81.7 FL — SIGNIFICANT CHANGE UP (ref 80–100)
MONOCYTES NFR BLD AUTO: 1 % — LOW (ref 2–14)
NEUTROPHILS NFR BLD AUTO: 54 % — SIGNIFICANT CHANGE UP (ref 43–77)
NITRITE UR-MCNC: NEGATIVE — SIGNIFICANT CHANGE UP
PCO2 BLDA: 39 MMHG — SIGNIFICANT CHANGE UP (ref 32–45)
PH BLDA: 7.29 — LOW (ref 7.35–7.45)
PH UR: 5.5 — SIGNIFICANT CHANGE UP (ref 5–8)
PHOSPHATE SERPL-MCNC: 2.6 MG/DL — SIGNIFICANT CHANGE UP (ref 2.5–4.5)
PLATELET # BLD AUTO: 209 K/UL — SIGNIFICANT CHANGE UP (ref 150–400)
PO2 BLDA: 80 MMHG — LOW (ref 83–108)
POTASSIUM SERPL-MCNC: 3.5 MMOL/L — SIGNIFICANT CHANGE UP (ref 3.5–5.3)
POTASSIUM SERPL-MCNC: 4.3 MMOL/L — SIGNIFICANT CHANGE UP (ref 3.5–5.3)
POTASSIUM SERPL-MCNC: SIGNIFICANT CHANGE UP MMOL/L (ref 3.5–5.3)
POTASSIUM SERPL-SCNC: 3.5 MMOL/L — SIGNIFICANT CHANGE UP (ref 3.5–5.3)
POTASSIUM SERPL-SCNC: 4.3 MMOL/L — SIGNIFICANT CHANGE UP (ref 3.5–5.3)
POTASSIUM SERPL-SCNC: SIGNIFICANT CHANGE UP MMOL/L (ref 3.5–5.3)
PROT SERPL-MCNC: 6.3 G/DL — SIGNIFICANT CHANGE UP (ref 6–8.3)
PROT UR-MCNC: 30 MG/DL
RAPID RVP RESULT: SIGNIFICANT CHANGE UP
RBC # BLD: 3.94 M/UL — SIGNIFICANT CHANGE UP (ref 3.8–5.2)
RBC # FLD: 15.6 % — SIGNIFICANT CHANGE UP (ref 10.3–16.9)
SAO2 % BLDA: 95 % — SIGNIFICANT CHANGE UP (ref 95–100)
SODIUM SERPL-SCNC: 136 MMOL/L — SIGNIFICANT CHANGE UP (ref 135–145)
SODIUM SERPL-SCNC: 138 MMOL/L — SIGNIFICANT CHANGE UP (ref 135–145)
SODIUM SERPL-SCNC: 140 MMOL/L — SIGNIFICANT CHANGE UP (ref 135–145)
SP GR SPEC: >=1.03 — SIGNIFICANT CHANGE UP (ref 1–1.03)
TROPONIN T SERPL-MCNC: 0.02 NG/ML — HIGH (ref 0–0.01)
TROPONIN T SERPL-MCNC: 0.05 NG/ML — CRITICAL HIGH (ref 0–0.01)
UROBILINOGEN FLD QL: 0.2 E.U./DL — SIGNIFICANT CHANGE UP
WBC # BLD: 10.2 K/UL — SIGNIFICANT CHANGE UP (ref 3.8–10.5)
WBC # FLD AUTO: 10.2 K/UL — SIGNIFICANT CHANGE UP (ref 3.8–10.5)

## 2018-08-15 PROCEDURE — 71045 X-RAY EXAM CHEST 1 VIEW: CPT | Mod: 26,77

## 2018-08-15 PROCEDURE — 36556 INSERT NON-TUNNEL CV CATH: CPT

## 2018-08-15 PROCEDURE — 99233 SBSQ HOSP IP/OBS HIGH 50: CPT | Mod: GC

## 2018-08-15 PROCEDURE — 99291 CRITICAL CARE FIRST HOUR: CPT | Mod: 25

## 2018-08-15 PROCEDURE — 71045 X-RAY EXAM CHEST 1 VIEW: CPT | Mod: 26

## 2018-08-15 RX ORDER — FENTANYL CITRATE 50 UG/ML
0.5 INJECTION INTRAVENOUS
Qty: 2500 | Refills: 0 | Status: DISCONTINUED | OUTPATIENT
Start: 2018-08-15 | End: 2018-08-15

## 2018-08-15 RX ORDER — SODIUM CHLORIDE 9 MG/ML
1000 INJECTION INTRAMUSCULAR; INTRAVENOUS; SUBCUTANEOUS
Qty: 0 | Refills: 0 | Status: DISCONTINUED | OUTPATIENT
Start: 2018-08-15 | End: 2018-08-15

## 2018-08-15 RX ORDER — LINEZOLID 600 MG/300ML
600 INJECTION, SOLUTION INTRAVENOUS EVERY 12 HOURS
Qty: 0 | Refills: 0 | Status: DISCONTINUED | OUTPATIENT
Start: 2018-08-15 | End: 2018-08-15

## 2018-08-15 RX ORDER — POTASSIUM CHLORIDE 20 MEQ
40 PACKET (EA) ORAL ONCE
Qty: 0 | Refills: 0 | Status: COMPLETED | OUTPATIENT
Start: 2018-08-15 | End: 2018-08-15

## 2018-08-15 RX ORDER — LINEZOLID 600 MG/300ML
600 INJECTION, SOLUTION INTRAVENOUS ONCE
Qty: 0 | Refills: 0 | Status: DISCONTINUED | OUTPATIENT
Start: 2018-08-15 | End: 2018-08-15

## 2018-08-15 RX ORDER — SODIUM CHLORIDE 9 MG/ML
5 INJECTION INTRAMUSCULAR; INTRAVENOUS; SUBCUTANEOUS ONCE
Qty: 0 | Refills: 0 | Status: COMPLETED | OUTPATIENT
Start: 2018-08-15 | End: 2018-08-15

## 2018-08-15 RX ORDER — FENTANYL CITRATE 50 UG/ML
50 INJECTION INTRAVENOUS
Qty: 0 | Refills: 0 | Status: DISCONTINUED | OUTPATIENT
Start: 2018-08-15 | End: 2018-08-15

## 2018-08-15 RX ORDER — SODIUM CHLORIDE 9 MG/ML
1000 INJECTION INTRAMUSCULAR; INTRAVENOUS; SUBCUTANEOUS ONCE
Qty: 0 | Refills: 0 | Status: COMPLETED | OUTPATIENT
Start: 2018-08-15 | End: 2018-08-15

## 2018-08-15 RX ORDER — PROPOFOL 10 MG/ML
5 INJECTION, EMULSION INTRAVENOUS
Qty: 1000 | Refills: 0 | Status: DISCONTINUED | OUTPATIENT
Start: 2018-08-15 | End: 2018-08-17

## 2018-08-15 RX ORDER — LINEZOLID 600 MG/300ML
600 INJECTION, SOLUTION INTRAVENOUS ONCE
Qty: 0 | Refills: 0 | Status: COMPLETED | OUTPATIENT
Start: 2018-08-15 | End: 2018-08-15

## 2018-08-15 RX ORDER — NOREPINEPHRINE BITARTRATE/D5W 8 MG/250ML
0.05 PLASTIC BAG, INJECTION (ML) INTRAVENOUS
Qty: 8 | Refills: 0 | Status: DISCONTINUED | OUTPATIENT
Start: 2018-08-15 | End: 2018-08-15

## 2018-08-15 RX ORDER — MIDAZOLAM HYDROCHLORIDE 1 MG/ML
6 INJECTION, SOLUTION INTRAMUSCULAR; INTRAVENOUS ONCE
Qty: 0 | Refills: 0 | Status: DISCONTINUED | OUTPATIENT
Start: 2018-08-15 | End: 2018-08-15

## 2018-08-15 RX ORDER — ACETAMINOPHEN 500 MG
1000 TABLET ORAL ONCE
Qty: 0 | Refills: 0 | Status: COMPLETED | OUTPATIENT
Start: 2018-08-15 | End: 2018-08-15

## 2018-08-15 RX ORDER — NOREPINEPHRINE BITARTRATE/D5W 8 MG/250ML
0.05 PLASTIC BAG, INJECTION (ML) INTRAVENOUS
Qty: 8 | Refills: 0 | Status: DISCONTINUED | OUTPATIENT
Start: 2018-08-15 | End: 2018-08-17

## 2018-08-15 RX ORDER — CHLORHEXIDINE GLUCONATE 213 G/1000ML
1 SOLUTION TOPICAL DAILY
Qty: 0 | Refills: 0 | Status: DISCONTINUED | OUTPATIENT
Start: 2018-08-15 | End: 2018-08-17

## 2018-08-15 RX ORDER — FENTANYL CITRATE 50 UG/ML
0.5 INJECTION INTRAVENOUS
Qty: 2500 | Refills: 0 | Status: DISCONTINUED | OUTPATIENT
Start: 2018-08-15 | End: 2018-08-17

## 2018-08-15 RX ORDER — POTASSIUM CHLORIDE 20 MEQ
20 PACKET (EA) ORAL ONCE
Qty: 0 | Refills: 0 | Status: DISCONTINUED | OUTPATIENT
Start: 2018-08-15 | End: 2018-08-15

## 2018-08-15 RX ORDER — PROPOFOL 10 MG/ML
5 INJECTION, EMULSION INTRAVENOUS
Qty: 1000 | Refills: 0 | Status: DISCONTINUED | OUTPATIENT
Start: 2018-08-15 | End: 2018-08-15

## 2018-08-15 RX ORDER — HALOPERIDOL DECANOATE 100 MG/ML
2.5 INJECTION INTRAMUSCULAR ONCE
Qty: 0 | Refills: 0 | Status: COMPLETED | OUTPATIENT
Start: 2018-08-15 | End: 2018-08-15

## 2018-08-15 RX ORDER — CISATRACURIUM BESYLATE 2 MG/ML
20 INJECTION INTRAVENOUS ONCE
Qty: 0 | Refills: 0 | Status: COMPLETED | OUTPATIENT
Start: 2018-08-15 | End: 2018-08-15

## 2018-08-15 RX ORDER — HALOPERIDOL DECANOATE 100 MG/ML
2.5 INJECTION INTRAMUSCULAR ONCE
Qty: 0 | Refills: 0 | Status: DISCONTINUED | OUTPATIENT
Start: 2018-08-15 | End: 2018-08-15

## 2018-08-15 RX ADMIN — PROPOFOL 2.66 MICROGRAM(S)/KG/MIN: 10 INJECTION, EMULSION INTRAVENOUS at 16:37

## 2018-08-15 RX ADMIN — Medication 1 PATCH: at 12:07

## 2018-08-15 RX ADMIN — Medication 3 MILLILITER(S): at 11:02

## 2018-08-15 RX ADMIN — CISATRACURIUM BESYLATE 20 MILLIGRAM(S): 2 INJECTION INTRAVENOUS at 15:38

## 2018-08-15 RX ADMIN — HEPARIN SODIUM 5000 UNIT(S): 5000 INJECTION INTRAVENOUS; SUBCUTANEOUS at 13:59

## 2018-08-15 RX ADMIN — SODIUM CHLORIDE 3000 MILLILITER(S): 9 INJECTION INTRAMUSCULAR; INTRAVENOUS; SUBCUTANEOUS at 05:22

## 2018-08-15 RX ADMIN — LINEZOLID 300 MILLIGRAM(S): 600 INJECTION, SOLUTION INTRAVENOUS at 15:00

## 2018-08-15 RX ADMIN — Medication 40 MILLIGRAM(S): at 07:54

## 2018-08-15 RX ADMIN — FENTANYL CITRATE 4.43 MICROGRAM(S)/KG/HR: 50 INJECTION INTRAVENOUS at 15:17

## 2018-08-15 RX ADMIN — Medication 40 MILLIGRAM(S): at 13:59

## 2018-08-15 RX ADMIN — Medication 400 MILLIGRAM(S): at 13:58

## 2018-08-15 RX ADMIN — PROPOFOL 2.66 MICROGRAM(S)/KG/MIN: 10 INJECTION, EMULSION INTRAVENOUS at 19:40

## 2018-08-15 RX ADMIN — MIDAZOLAM HYDROCHLORIDE 6 MILLIGRAM(S): 1 INJECTION, SOLUTION INTRAMUSCULAR; INTRAVENOUS at 14:55

## 2018-08-15 RX ADMIN — Medication 40 MILLIEQUIVALENT(S): at 07:54

## 2018-08-15 RX ADMIN — Medication 3 MILLILITER(S): at 22:59

## 2018-08-15 RX ADMIN — SODIUM CHLORIDE 3000 MILLILITER(S): 9 INJECTION INTRAMUSCULAR; INTRAVENOUS; SUBCUTANEOUS at 03:05

## 2018-08-15 RX ADMIN — FENTANYL CITRATE 50 MICROGRAM(S): 50 INJECTION INTRAVENOUS at 15:26

## 2018-08-15 RX ADMIN — Medication 400 MILLIGRAM(S): at 01:54

## 2018-08-15 RX ADMIN — HEPARIN SODIUM 5000 UNIT(S): 5000 INJECTION INTRAVENOUS; SUBCUTANEOUS at 21:35

## 2018-08-15 RX ADMIN — SODIUM CHLORIDE 100 MILLILITER(S): 9 INJECTION INTRAMUSCULAR; INTRAVENOUS; SUBCUTANEOUS at 14:00

## 2018-08-15 RX ADMIN — FENTANYL CITRATE 50 MICROGRAM(S): 50 INJECTION INTRAVENOUS at 15:15

## 2018-08-15 RX ADMIN — FENTANYL CITRATE 50 MICROGRAM(S): 50 INJECTION INTRAVENOUS at 15:14

## 2018-08-15 RX ADMIN — SODIUM CHLORIDE 3000 MILLILITER(S): 9 INJECTION INTRAMUSCULAR; INTRAVENOUS; SUBCUTANEOUS at 01:15

## 2018-08-15 RX ADMIN — Medication 40 MILLIGRAM(S): at 21:35

## 2018-08-15 RX ADMIN — SODIUM CHLORIDE 5 MILLILITER(S): 9 INJECTION INTRAMUSCULAR; INTRAVENOUS; SUBCUTANEOUS at 12:07

## 2018-08-15 RX ADMIN — Medication 3 MILLILITER(S): at 19:40

## 2018-08-15 RX ADMIN — Medication 3 MILLILITER(S): at 15:04

## 2018-08-15 RX ADMIN — PROPOFOL 2.66 MICROGRAM(S)/KG/MIN: 10 INJECTION, EMULSION INTRAVENOUS at 23:44

## 2018-08-15 RX ADMIN — HALOPERIDOL DECANOATE 2.5 MILLIGRAM(S): 100 INJECTION INTRAMUSCULAR at 00:56

## 2018-08-15 RX ADMIN — HEPARIN SODIUM 5000 UNIT(S): 5000 INJECTION INTRAVENOUS; SUBCUTANEOUS at 07:55

## 2018-08-15 RX ADMIN — FENTANYL CITRATE 50 MICROGRAM(S): 50 INJECTION INTRAVENOUS at 15:16

## 2018-08-15 RX ADMIN — Medication 3 MILLILITER(S): at 06:42

## 2018-08-15 RX ADMIN — Medication 8.32 MICROGRAM(S)/KG/MIN: at 19:41

## 2018-08-15 RX ADMIN — Medication 8.32 MICROGRAM(S)/KG/MIN: at 16:37

## 2018-08-15 RX ADMIN — Medication 1 PATCH: at 11:50

## 2018-08-15 RX ADMIN — MIDAZOLAM HYDROCHLORIDE 6 MILLIGRAM(S): 1 INJECTION, SOLUTION INTRAMUSCULAR; INTRAVENOUS at 15:27

## 2018-08-15 NOTE — DIETITIAN INITIAL EVALUATION ADULT. - OTHER INFO
57 yo/female with PMHx asthma, COPD, smoker, presents with COPD exacerbation 2/2 sepsis in the setting on legionella pna. Course c/b desaturation and tachypnea overnight, leading to transfer to the MICU. Pt seen in room, sleeping with BiPAP mask on, left to rest at this time. Daughters at bedside. They endorse that pt eats very well at home, no dietary restrictions. Currently NPO 2/2 BiPAP, and pt had not been eating much since entering hospital. No complaints of N/V/C/D per family. Allergy to cucumbers reported. Skin intact pressure-wise. No usual difficulty chewing or swallowing. Discussed diet advancement pending removal of BiPAP.

## 2018-08-15 NOTE — PROGRESS NOTE ADULT - PROBLEM SELECTOR PLAN 1
Severe sepsis 2/2 to CAP in setting of COPD  Septic on admission, given Vanc/Zosyn x1 in ED. Lesser on differential: myoplasma TB, pulmonary embolism, malignancy. No leukocytosis since admission. Today is day 3 of 7 of tentative antibiotic regimen.   -started on levaquin 750 mg IVPB (Day 3 of antibiotics)  -d/c'ed 2g cefepime IVPB for 2 days and doxycycline 100mg q 12hrs (2nd day of antibiotic treatment)  -s/p 2 doses of vancomycin and zosyn   -c/w leukocytosis trend  -c/w with daily CXRs  -2 sets of blood cx negative so far  -if afebrile for 24hrs, blood cxs negative, then pt may benefit from a PICC line. Severe sepsis 2/2 to CAP in setting of COPD  Septic on admission, given Vanc/Zosyn x1 in ED. Lesser on differential: . No leukocytosis since admission. Legionella antigen positive in urine. Likely Legionella PNA vs myoplasma TB, pulmonary embolism, malignancy. Today is day 3 of 7 of tentative antibiotic regimen.   -started on levaquin 750 mg IVPB (Day 3 of antibiotics)  -d/c'ed 2g cefepime IVPB for 2 days and doxycycline 100mg q 12hrs   -s/p 2 doses of vancomycin and zosyn   -c/w with daily CXRs  -if afebrile for 24hrs, blood cxs negative, then pt may benefit from a PICC line.

## 2018-08-15 NOTE — PROGRESS NOTE ADULT - PROBLEM SELECTOR PLAN 6
(Resolved)   sCr 1.36 on admission, CLARISSA most liekly in the setting of dehydration and sepsis  -Creatinine 0.99 <- 1.36  -Continue to trend sCr with daily CMPs  -Avoid nephrotoxic agents
Pt noted to have elevated CK, unclear etiology. No recent falls.   -Continue to trend  -f/u CK levels  -f/u UA, myoglobin
(Resolved)   sCr 1.36 on admission, CLARISSA most liekly in the setting of dehydration and sepsis  -Creatinine 0.99 <- 1.36  -Continue to trend sCr with daily CMPs  -Avoid nephrotoxic agents

## 2018-08-15 NOTE — DIETITIAN INITIAL EVALUATION ADULT. - PROBLEM SELECTOR PLAN 5
Patient p/w chest pain, with risk factors for PE including smoking history. Elevated D-dimer, CT chest negative for PE  -HepSUbQ, SCDs  -continue to closely monitor respiratory status

## 2018-08-15 NOTE — PROGRESS NOTE ADULT - PROBLEM SELECTOR PLAN 9
Fluids: NaCl 0.9% 100cc/hr  Electrolytes: replete as necessary, K>4, Mg>2   Nutrition: DASH/TLC diet    Bowel Regimen: not indicated  DVT ppx: heparin 5000 subQ q8h  Code: Full  [facility]
Fluids: NaCl 0.9% 100cc/hr  Electrolytes: replete as necessary, K>4, Mg>2   Nutrition: DASH/TLC diet    Bowel Regimen: not indicated  DVT ppx: heparin 5000 subQ q8h  Code: Full  [facility]
Fluids: NaCl 0.9% 100cc/hr  Electrolytes: replete as necessary, K>4, Mg>2   Nutrition: DASH/TLC diet    Bowel Regimen: not indicated  DVT ppx: heparin 5000 subQ q8h  Code: Full  [facility]

## 2018-08-15 NOTE — PROGRESS NOTE ADULT - ASSESSMENT
57yo  female PMHx asthma, COPD, current smoker unspecified amount of PPD), no hospitalizations or intubations presents with SOB, nonpurulent cough for a few days, found to have a LLL infiltrate, admitted and managed for severe sepsis 2/2 to legionella pneumonia in the setting of COPD. Worsening respiratory status/agitation now step up to MICU for further monitoring      Pulmonology   Hypoxic resp failure secondary to legionella pneumonia in the setting of COPD exacerbation   CXR demonstrating LLL infiltrate.   C/w Levaquin 750 q 24 hours-monitor qt interval   F/u sputum cx   BIPAP overnight     # COPD exacerbation  Pt has hx of COPD , current smoker   c/w prednisone 40 mg daily    Keep SpO2 between 88-92%  c/w tiotropium/olodaterol  inhaler   c/w Duoneb PRN  f/u pulm upon discharge       Infectious disease   Pt was septic upon admission, etiology secondary to LLL PNA   c/w Levaquin 750 mg IVPB   Pt spiked fever overnight to 104  will send UA, blood cx, sputum cx  cooling blanket for now  iv Tylenol   Consider broadening abx       GI  #ELEVATED LFTS     workup so far showing hepatitis panel negative, acetaminophen levels normal  -Continue to trend LFTs with daily labs  -f/u abdominal ultrasound to rule out and liver lesions.     #Metabolic   ·  Problem: Elevated CK.  Plan: Pt noted to have elevated CK, unclear etiology. No recent falls. (Increasing). Most likely in the setting of worsening sepsis.   -3788<-960<-1180  -f/u CK levels  -f/u UA, myoglobin.     # Renal   CLARISSA (acute kidney injury).  RESOLVED   sCr 1.36 on admission, CLARISSA most likely in the setting of dehydration and sepsis  -Creatinine 0.99 <- 1.36        F: administer 1L NS bolus stat  E replete as necessary, K>4, Mg>2   Nutrition: NPO overnight for BIPAP  D: MICU     DVT ppx: heparin 5000 subQ q8h, SCD   Code: Full 59yo  female PMHx asthma, COPD, current smoker unspecified amount of PPD), no hospitalizations or intubations presents with SOB, nonpurulent cough for a few days, found to have a LLL infiltrate, admitted and managed for severe sepsis 2/2 to legionella pneumonia in the setting of COPD. Worsening respiratory status/agitation now step up to MICU for further monitoring      Pulmonology   Hypoxic resp failure secondary to legionella pneumonia in the setting of COPD exacerbation   CXR demonstrating LLL infiltrate.   C/w Levaquin 750 q 24 hours-monitor qt interval   F/u sputum cx   BIPAP overnight     # COPD exacerbation  Pt has hx of COPD , current smoker   c/w prednisone 40 mg daily    Keep SpO2 between 88-92%  c/w tiotropium/olodaterol  inhaler   c/w Duoneb PRN  f/u pulm upon discharge       Infectious disease   Pt was septic upon admission, etiology secondary to LLL PNA   c/w Levaquin 750 mg IVPB   Pt spiked fever overnight to 104  will send UA, blood cx, sputum cx  cooling blanket for now  iv Tylenol stat  1 L N.S bolus stat  Consider broadening abx   send RVP panel       GI  #ELEVATED LFTS     workup so far showing hepatitis panel negative, acetaminophen levels normal  -Continue to trend LFTs with daily labs  -f/u abdominal ultrasound to rule out and liver lesions.     #Metabolic   ·  Problem: Elevated CK.  Plan: Pt noted to have elevated CK, unclear etiology. No recent falls. (Increasing). Most likely in the setting of worsening sepsis.   -3788<-960<-1180  -f/u CK levels  -f/u UA, myoglobin.     # Renal   CLARISSA (acute kidney injury).  RESOLVED   sCr 1.36 on admission, CLARISSA most likely in the setting of dehydration and sepsis  -Creatinine 0.99 <- 1.36        F: administer 1L NS bolus stat  E replete as necessary, K>4, Mg>2   Nutrition: NPO overnight for BIPAP  D: MICU     DVT ppx: heparin 5000 subQ q8h, SCD   Code: Full 59yo  female PMHx asthma, COPD, current smoker unspecified amount of PPD), no hospitalizations or intubations presents with SOB, nonpurulent cough for a few days, found to have a LLL infiltrate, admitted and managed for severe sepsis 2/2 to legionella pneumonia in the setting of COPD. Worsening respiratory status/agitation now step up to MICU for further monitoring      Pulmonology   Hypoxic resp failure secondary to legionella pneumonia in the setting of COPD exacerbation   CXR demonstrating LLL infiltrate.   C/w Levaquin 750 q 24 hours-monitor qt interval   F/u sputum cx   c/w high flow for now      # COPD exacerbation  Pt has hx of COPD , current smoker   c/w prednisone 40 mg daily    Keep SpO2 between 88-92%  c/w tiotropium/olodaterol  inhaler   c/w Duoneb PRN  f/u pulm upon discharge       Infectious disease   Pt was septic upon admission, etiology secondary to LLL PNA   c/w Levaquin 750 mg IVPB   Pt spiked fever overnight to 104  will send UA, blood cx, sputum cx  cooling blanket for now  iv Tylenol stat  1 L N.S bolus stat  Consider broadening abx   send RVP panel       GI  #ELEVATED LFTS     workup so far showing hepatitis panel negative, acetaminophen levels normal  -Continue to trend LFTs with daily labs  -f/u abdominal ultrasound to rule out and liver lesions.     #Metabolic   ·  Problem: Elevated CK.  Plan: Pt noted to have elevated CK, unclear etiology. No recent falls. (Increasing). Most likely in the setting of worsening sepsis.   -[id number]<-960<-1180  -f/u CK levels  -f/u UA, myoglobin.     # Renal   CLARISSA (acute kidney injury).  RESOLVED   sCr 1.36 on admission, CLARISSA most likely in the setting of dehydration and sepsis  -Creatinine 0.99 <- 1.36        F: administer 1L NS bolus stat  E replete as necessary, K>4, Mg>2   Nutrition: NPO overnight   D: MICU     DVT ppx: heparin 5000 subQ q8h, SCD   Code: Full 57yo  female PMHx asthma, COPD, current smoker unspecified amount of PPD), no hospitalizations or intubations presents with SOB, nonpurulent cough for a few days, found to have a LLL infiltrate, admitted and managed for severe sepsis 2/2 to legionella pneumonia in the setting of COPD. Worsening respiratory status/agitation now step up to MICU for further monitoring      Pulmonology   Hypoxic resp failure secondary to legionella pneumonia in the setting of COPD exacerbation   CXR demonstrating LLL infiltrate.   C/w Levaquin 750 q 24 hours-monitor qt interval   F/u sputum cx   c/w high flow for now      # COPD exacerbation  Pt has hx of COPD , current smoker   c/w prednisone 40 mg daily    Keep SpO2 between 88-92%  c/w tiotropium/olodaterol  inhaler   c/w Duoneb PRN  f/u pulm upon discharge       Infectious disease   Pt was septic upon admission, etiology secondary to LLL PNA   c/w Levaquin 750 mg IVPB   Pt spiked fever overnight to 104  will send UA, blood cx, sputum cx  cooling blanket for now  iv Tylenol stat  1 L N.S bolus stat  Consider broadening abx   send RVP panel       GI  #ELEVATED LFTS     workup so far showing hepatitis panel negative, acetaminophen levels normal  etiology likely related to legionella infection  -Continue to trend LFTs with daily labs  -f/u abdominal ultrasound to rule out and liver lesions.     #Metabolic   ·  Problem: Elevated CK.  Plan: Pt noted to have elevated CK, currently uptrending   etiology secondary to rhabdo in setting of legionella .      # Renal   CLARISSA (acute kidney injury).  RESOLVED   sCr 1.36 on admission, CLARISSA most likely in the setting of dehydration and sepsis  -Creatinine 0.99 <- 1.36        F: administer 1L NS bolus stat  E replete as necessary, K>4, Mg>2   Nutrition: NPO overnight   D: MICU     DVT ppx: heparin 5000 subQ q8h, SCD   Code: Full 57yo  female PMHx asthma, COPD, current smoker unspecified amount of PPD), no hospitalizations or intubations presents with SOB, nonpurulent cough for a few days, found to have a LLL infiltrate, admitted and managed for severe sepsis 2/2 to legionella pneumonia in the setting of COPD. Worsening respiratory status/agitation now step up to MICU for further monitoring      Pulmonology   Hypoxic resp failure secondary to legionella pneumonia in the setting of COPD exacerbation   CXR demonstrating LLL infiltrate.   C/w Levaquin 750 q 24 hours-monitor qt interval   F/u sputum cx   c/w high flow for now      # COPD exacerbation  Pt has hx of COPD , current smoker   c/w prednisone 40 mg daily    Keep SpO2 between 88-92%  c/w tiotropium/olodaterol  inhaler   c/w Duoneb PRN  f/u pulm upon discharge       Infectious disease   Pt was septic upon admission, etiology secondary to LLL PNA   c/w Levaquin 750 mg IVPB   Pt spiked fever overnight to 104  will send UA, blood cx, sputum cx  cooling blanket for now  iv Tylenol stat  1 L N.S bolus stat  Consider broadening abx   send RVP panel       GI  #ELEVATED LFTS     workup so far showing hepatitis panel negative, acetaminophen levels normal  etiology likely related to legionella infection  -Continue to trend LFTs with daily labs    #Metabolic   ·  Problem: Elevated CK.  Plan: Pt noted to have elevated CK, currently uptrending   etiology secondary to rhabdo in setting of legionella .  continue to trend CK     # Renal   CLARISSA (acute kidney injury).  RESOLVED   sCr 1.36 on admission, CLARISSA most likely in the setting of dehydration and sepsis  -Creatinine 0.99 <- 1.36        F: administer 1L NS bolus stat  E replete as necessary, K>4, Mg>2   Nutrition: NPO overnight   D: MICU     DVT ppx: heparin 5000 subQ q8h, SCD   Code: Full

## 2018-08-15 NOTE — PROGRESS NOTE ADULT - PROBLEM SELECTOR PROBLEM 10
Transition of care performed with sharing of clinical summary

## 2018-08-15 NOTE — PROCEDURE NOTE - NSPROCDETAILS_GEN_ALL_CORE
lumen(s) aspirated and flushed/sterile dressing applied/guidewire recovered/sterile technique, catheter placed/ultrasound guidance
patient pre-oxygenated, tube inserted, placement confirmed

## 2018-08-15 NOTE — PROCEDURE NOTE - NSINDICATIONS_GEN_A_CORE
respiratory distress/respiratory failure
venous access/dialysis/CRRT/critical illness/emergency venous access

## 2018-08-15 NOTE — DIETITIAN INITIAL EVALUATION ADULT. - ENERGY NEEDS
Height 65"; .5#; #; 156%IBW  BMI 32.5  Ideal body weight used for calculations as pt >120% of IBW. Needs estimated for maintenance in adults

## 2018-08-15 NOTE — DIETITIAN INITIAL EVALUATION ADULT. - PROBLEM SELECTOR PLAN 9
Fluids: NaCl 0.9% 100cc/hr  Electrolytes: replete as necessary, K>4, Mg>2   Nutrition: DASH/TLC diet    Bowel Regimen: not indicated  DVT ppx: heparin 5000 subQ q8h  Code: Full  [facility]

## 2018-08-15 NOTE — PROGRESS NOTE ADULT - PROBLEM SELECTOR PLAN 2
CAP Pneumonia in the setting of COPD exacerbation  History of tobacco use and COPD and first hospitalization. CXR demonstrating LLL infiltrate. Most likely CAP in the setting of COPD exacerbation, afebrile on admission, wbc count unremarkable, not septic. Less likely on differential: TB, malignancy, CHF. Malignancy cannot be excluded based on CT scan. Pulmonary embolism ruled out on CT scan.   -CXR: LLL infiltrate  - f/u blood cx  - Given prolonged QTC, not a candidate for Macrolide or FQ.   - dc'ed c/w vancomycin 1250 mg IV every 12 hours and 3.375 g zosyn q6h today  -Will consider repeat CT to exclude possibility of any malignant mass after finish course of antibiotics.   -As per pulmonalogy CAP Pneumonia in the setting of COPD exacerbation. Legionella + UA   History of tobacco use and COPD and first hospitalization. CXR demonstrating LLL infiltrate. Most likely CAP in the setting of COPD exacerbation, afebrile on admission, wbc count unremarkable, not septic. Less likely on differential: TB, malignancy, CHF. Malignancy cannot be excluded based on CT scan. Pulmonary embolism ruled out on CT scan.   -CXR: LLL infiltrate  - f/u blood cx.  - dc'ed c/w vancomycin 1250 mg IV every 12 hours and 3.375 g zosyn q6h today  -Will consider repeat CT to exclude possibility of any malignant mass after finish course of antibiotics.   -As per pulmonalogy

## 2018-08-15 NOTE — DIETITIAN INITIAL EVALUATION ADULT. - PROBLEM SELECTOR PLAN 3
Patient p/w progressive SOB, requiring frequent resscue inhaler at home, admitted for COPD exacerbation 2/2 CAP (LLL infiltrate on CXR). Lungs clear without wheezes. s/p multiple runs of duonebs, [id number], SoluMedrol 125 mg IVP x1 in ED, transferred to [facility] from [facility] withi non-rebreather mask. Switched to HFNC upon arrival, saturating well.  -c/w HIFLO NC and titrate as tolerated  -c/w duonebs  -keep SpO2 between 88-92%  -s/p SoluMedrol 125 mg IVP x1 in ED, will c/w SoluMedrol 40 mg IVP q6h starting 8/13  -No known history of DM, ISS while on steroids

## 2018-08-15 NOTE — PROGRESS NOTE ADULT - ASSESSMENT
57yo  female PMHx asthma, COPD, current smoker unspecified amount of PPD), no hospitalizations or intubations presents with SOB, nonpurulent cough for a few days, found to have a LLL infiltrate, admitted and managed for severe sepsis 2/2 to CAP in the setting of COPD. Worsening respiratory status. 59yo  female PMHx asthma, COPD, current smoker unspecified amount of PPD), no hospitalizations or intubations presents with SOB, nonpurulent cough for a few days, found to have a LLL infiltrate, admitted and managed for severe sepsis 2/2 to CAP in the setting of COPD. Worsening respiratory status with increased oxygen demand and urine + for Leigonella.

## 2018-08-15 NOTE — DIETITIAN INITIAL EVALUATION ADULT. - PROBLEM SELECTOR PLAN 1
Patient meeting 2/4 SIRS criteria (tachycardia, tachypnea) with + source (LLL infiltrate) on presentation with mild leukocytosis. Lactate negative. Patient reporting fevers, cough days leading up to admission. s/p Vanc/Zosyn x1 in ED. Admitted for sepsis in the setting of COPD exacerbation 2/2 CAP.  -c/w broad spectrum abxs with Vanc/Zosyn, de-escalate as appropriate  -f/u Bcx  -continue to trend WBC with daily CBCs

## 2018-08-15 NOTE — PROGRESS NOTE ADULT - PROBLEM SELECTOR PLAN 10
Next Visit Date:  Future Appointments  Date Time Provider Naya Roger   4/3/2018 11:00 AM Garry Briones [address] [place] Maintenance   Topic Date Due    Hepatitis C screen  1959    HIV screen  01/05/1974    DTaP/Tdap/Td vaccine (1 - Tdap) 07/19/2003    Shingles Vaccine (1 of 2 - 2 Dose Series) 01/05/2009    Breast cancer screen  07/19/2015    Flu vaccine (1) 09/01/2017    Cervical cancer screen  02/02/2019    Lipid screen  02/02/2021    Colon cancer screen colonoscopy  02/02/2025    Pneumococcal med risk  Completed       No results found for: LABA1C          ( goal A1C is < 7)   No results found for: LABMICR  LDL Cholesterol (mg/dL)   Date Value   02/02/2016 117       (goal LDL is <100)   AST (U/L)   Date Value   02/02/2016 37 (H)     ALT (U/L)   Date Value   02/02/2016 32     BUN (mg/dL)   Date Value   03/30/2017 16     BP Readings from Last 3 Encounters:   02/12/18 124/84   12/13/17 138/80   09/26/17 130/70          (goal 120/80)    All Future Testing planned in [facility]  Lab Frequency Next Occurrence   HIV Screen Once 12/13/2017   Hepatitis C Antibody Once 12/13/2017   DEXA BONE DENSITY 2 SITES Once 01/08/2018               Patient Active Problem List:     Adult ADHD     Narcolepsy     Asthma     History of DVT (deep vein thrombosis)     DVT of leg (deep venous thrombosis) (HCC)     Closed displaced fracture of body of calcaneus     Hiatal hernia     Internal hemorrhoids     Helicobacter pylori (H. pylori) infection     Dysphagia     Cervical radiculopathy
1) PCP Contacted on Admission: (Y/N) --> Name & Phone #: UNKNOWN  2) Date of Contact with PCP: N/A  3) PCP Contacted at Discharge: (Y/N)  4) Summary of Handoff Given to PCP:   5) Post-Discharge Appointment Date and Location:

## 2018-08-15 NOTE — PROGRESS NOTE ADULT - PROBLEM SELECTOR PLAN 8
(Resolved)  -QTc today 435, prolonged QTc 521 on admission  -hold off any QT prolonging meds, including Azithromycin  -Continue to monitor
Patient p/w chest pain, with risk factors for PE including smoking history. Elevated D-dimer, CT chest negative for PE  -HepSUbQ, SCDs  -continue to closely monitor respiratory status
Patient p/w chest pain, with risk factors for PE including smoking history. Elevated D-dimer, CT chest negative for PE  -HepSUbQ, SCDs  -continue to closely monitor respiratory status

## 2018-08-15 NOTE — PROGRESS NOTE ADULT - PROBLEM SELECTOR PLAN 7
(Resolved)  -QTc today 435, prolonged QTc 521 on admission  -hold off any QT prolonging meds, including Azithromycin  -Continue to monitor (Resolved)  -QTc today 435, prolonged QTc 521 on admission  -hold off any QT prolonging meds, including Azithromycin  -Continue to monitor  - EKG

## 2018-08-15 NOTE — PROGRESS NOTE ADULT - ATTENDING COMMENTS
Pt with acute hypoxic respiratory failure and severe sepsis due to necrotizing Legionella CAP, now intubated and on mechanical vent support and sedated. Continue critical care level of care. Pt on levaquin, sedated with Propofol, Fentanyl and Versed. Hemodynamically stable. Continue standard vent bundle, start enteral nutrition, minimize IVFs, monitor UOP. DVT/GI prophy.
Patient seen and examined with house-staff during bedside rounds.  Resident note read, including vitals, physical findings, laboratory data, and radiological reports.   Revisions included below.  Direct personal management at bed side and extensive interpretation of the data.  Plan was outlined and discussed in details with the housestaff.  Decision making of high complexity  Action taken for acute disease activity to reflect the level of care provided:  - medication reconciliation  - review laboratory data  I discussed the case with pulmonary. Patient has a community acquired pneumonia with underlying COPD. Is no evidence of acute exacerbation of COPD. Patient had a QT and prolongation with azithromycin and now is complaining off G.I. upset from the doxycycline. Change to Levaquin and will observed. Await Legionella titers. The temperature curve is decreasing.
This patient was evaluated in the [facility] with the resident, nurse manager ans other staff, she was being treated for a left lobar PNA and was pulling out the ECG leads, non compliant to medical management, temperature 103F.  She was not cooperating even in the presence of her daughters.  She agreed to our management but a few minutes later she was again non compliant she also was desaturating and  was transferred to the MICU for more aggressive management.  In the MICU she was given haldol with some improvement.  -SOB  -PNA (legionella)  -acute delirium  -tobacco user  -hypokalemia  >see above for the detailed plan

## 2018-08-15 NOTE — DIETITIAN INITIAL EVALUATION ADULT. - PROBLEM SELECTOR PLAN 6
Pt noted to have elevated CK, unclear etiology. No recent falls.   -Continue to trend  -f/u UA, myoglobin

## 2018-08-15 NOTE — PROGRESS NOTE ADULT - PROBLEM SELECTOR PLAN 4
Transaminitis most likely in the setting of worsening sepsis  -AST/ALT: 164/218 today, slightly improved from admission   -hepatitis panel negative  -negative fro acetaminophen toxicty  -Continue to trend LFTs with daily labs  -f/u abdominal ultrasound to rule out and liver lesions
sCr 1.36 on admission, unknown baseline, unclear etiology at this time, possibly pre-renal 2/2 insensible losses as patient endorsing fevers at home vs intrinsic given elevated CK, ?Rhabdomyolysis    -obtain UA, Urine studies, calculate FeNa  -Continue to trend sCr with daily CMPs  -Avoid nephrotoxic agents
Transaminitis most likely in the setting of worsening sepsis  -AST/ALT: 164/218 today, slightly improved from admission   -hepatitis panel negative  -negative fro acetaminophen toxicty  -Continue to trend LFTs with daily labs  -f/u abdominal ultrasound to rule out and liver lesions

## 2018-08-15 NOTE — PROGRESS NOTE ADULT - SUBJECTIVE AND OBJECTIVE BOX
Acceptance note: Transfer from [facility] to MICU           VITAL SIGNS:  Vital Signs Last 24 Hrs  T(C): 39.4 (14 Aug 2018 23:58), Max: 39.4 (14 Aug 2018 23:58)  T(F): 103 (14 Aug 2018 23:58), Max: 103 (14 Aug 2018 23:58)  HR: 117 (14 Aug 2018 21:55) (90 - 142)  BP: 165/99 (14 Aug 2018 20:21) (101/58 - 175/95)  BP(mean): 125 (14 Aug 2018 20:21) (74 - 128)  RR: 27 (14 Aug 2018 21:55) (21 - 32)  SpO2: 95% (14 Aug 2018 21:55) (85% - 95%)    PHYSICAL EXAM:    General: WDWN  HEENT: NC/AT; PERRL, anicteric sclera; MMM  Neck: supple  Cardiovascular: +S1/S2; RRR  Respiratory: CTA B/L; no W/R/R  Gastrointestinal: soft, NT/ND; +BSx4  Extremities: WWP; no edema, clubbing or cyanosis  Vascular: 2+ radial, DP/PT pulses B/L  Neurological: AAOx3; no focal deficits    MEDICATIONS:  MEDICATIONS  (STANDING):  ALBUTerol/ipratropium for Nebulization 3 milliLiter(s) Nebulizer every 4 hours  dextrose 50% Injectable 12.5 Gram(s) IV Push once  dextrose 50% Injectable 25 Gram(s) IV Push once  dextrose 50% Injectable 25 Gram(s) IV Push once  haloperidol    Injectable 2.5 milliGRAM(s) IntraMuscular once  heparin  Injectable 5000 Unit(s) SubCutaneous every 8 hours  insulin lispro (HumaLOG) corrective regimen sliding scale   SubCutaneous Before meals and at bedtime  levoFLOXacin IVPB 750 milliGRAM(s) IV Intermittent every 24 hours  nicotine - 21 mG/24Hr(s) Patch 1 patch Transdermal daily  predniSONE   Tablet 40 milliGRAM(s) Oral daily  tiotropium 2.5 MICROgram(s)/olodaterol 2.5 MICROgram(s) (STIOLTO) Inhaler 2 Puff(s) Inhalation daily    MEDICATIONS  (PRN):  acetaminophen   Tablet 650 milliGRAM(s) Oral every 6 hours PRN For Temp greater than 38 C (100.4 F)  dextrose 40% Gel 15 Gram(s) Oral once PRN Blood Glucose LESS THAN 70 milliGRAM(s)/deciliter  diphenhydrAMINE   Capsule 25 milliGRAM(s) Oral every 4 hours PRN Rash and/or Itching  glucagon  Injectable 1 milliGRAM(s) IntraMuscular once PRN Glucose LESS THAN 70 milligrams/deciliter  oxyCODONE    5 mG/acetaminophen 325 mG 2 Tablet(s) Oral every 4 hours PRN Severe Pain (7 - 10)  petrolatum white Ointment 1 Application(s) Topical daily PRN Lip Dryness      ALLERGIES:  Allergies    penicillin (Rash)  vancomycin (Rash)    Intolerances        LABS:                        10.4   9.6   )-----------( 204      ( 14 Aug 2018 06:02 )             31.7     08-14    135  |  102  |  17  ----------------------------<  121<H>  3.2<L>   |  18<L>  |  0.99    Ca    8.7      14 Aug 2018 06:02  Mg     2.3     08-13    TPro  6.5  /  Alb  2.6<L>  /  TBili  1.7<H>  /  DBili  x   /  AST  158<H>  /  ALT  189<H>  /  AlkPhos  87  08-14        CAPILLARY BLOOD GLUCOSE      POCT Blood Glucose.: 97 mg/dL (14 Aug 2018 22:02)      RADIOLOGY & ADDITIONAL TESTS: Reviewed.    ASSESSMENT:    PLAN: Acceptance note: Transfer from [facility] to MICU   57yo  female PMHx asthma, COPD, current smoker (unspecified amount of years), no hospitalizations or intubations in the past presented to [facility] ER with SOB for the past 3 days.  She did not use her asthma pump in the past few days. She admited to having fevers at home and a nonpurulent cough.Upon arrival to [facility]  CXR showed LLL infiltrate. CT chest: LLL infiltrate with left mediastinal lymphadenopathy,  severe emphysema. In the ED T: 99HR: 109 RR: 22 BP: 123/70 SpO2: 91 on RA, subsequently placed on high flow.  Labs showing Wbc: 11.4 ,, ALT: 270. EKG showed prolong QT and sinus tachycardia, negative troponin She was given 1250mg vancomycin and 3.375 piperzillin/tazobactam. Pt was septic in setting of LLL pna  Pt  transferred to  [facility] for further management for CAP 2/2 in the setting of acute COPD exacerbation. Pt found to have positive legionella antigen and was started on iv Levaquin q daily . During hospital course pt noted to have increased frequency of agitation and resp distress, now transferred to MICU for further monitoring.     VITAL SIGNS:  Vital Signs Last 24 Hrs  T(C): 39.4 (14 Aug 2018 23:58), Max: 39.4 (14 Aug 2018 23:58)  T(F): 103 (14 Aug 2018 23:58), Max: 103 (14 Aug 2018 23:58)  HR: 117 (14 Aug 2018 21:55) (90 - 142)  BP: 165/99 (14 Aug 2018 20:21) (101/58 - 175/95)  BP(mean): 125 (14 Aug 2018 20:21) (74 - 128)  RR: 27 (14 Aug 2018 21:55) (21 - 32)  SpO2: 95% (14 Aug 2018 21:55) (85% - 95%)    PHYSICAL EXAM:    General: WDWN  HEENT: NC/AT; PERRL, anicteric sclera; MMM  Neck: supple  Cardiovascular: +S1/S2; RRR  Respiratory: CTA B/L; no W/R/R  Gastrointestinal: soft, NT/ND; +BSx4  Extremities: WWP; no edema, clubbing or cyanosis  Vascular: 2+ radial, DP/PT pulses B/L  Neurological: AAOx3; no focal deficits    MEDICATIONS:  MEDICATIONS  (STANDING):  ALBUTerol/ipratropium for Nebulization 3 milliLiter(s) Nebulizer every 4 hours  dextrose 50% Injectable 12.5 Gram(s) IV Push once  dextrose 50% Injectable 25 Gram(s) IV Push once  dextrose 50% Injectable 25 Gram(s) IV Push once  haloperidol    Injectable 2.5 milliGRAM(s) IntraMuscular once  heparin  Injectable 5000 Unit(s) SubCutaneous every 8 hours  insulin lispro (HumaLOG) corrective regimen sliding scale   SubCutaneous Before meals and at bedtime  levoFLOXacin IVPB 750 milliGRAM(s) IV Intermittent every 24 hours  nicotine - 21 mG/24Hr(s) Patch 1 patch Transdermal daily  predniSONE   Tablet 40 milliGRAM(s) Oral daily  tiotropium 2.5 MICROgram(s)/olodaterol 2.5 MICROgram(s) (STIOLTO) Inhaler 2 Puff(s) Inhalation daily    MEDICATIONS  (PRN):  acetaminophen   Tablet 650 milliGRAM(s) Oral every 6 hours PRN For Temp greater than 38 C (100.4 F)  dextrose 40% Gel 15 Gram(s) Oral once PRN Blood Glucose LESS THAN 70 milliGRAM(s)/deciliter  diphenhydrAMINE   Capsule 25 milliGRAM(s) Oral every 4 hours PRN Rash and/or Itching  glucagon  Injectable 1 milliGRAM(s) IntraMuscular once PRN Glucose LESS THAN 70 milligrams/deciliter  oxyCODONE    5 mG/acetaminophen 325 mG 2 Tablet(s) Oral every 4 hours PRN Severe Pain (7 - 10)  petrolatum white Ointment 1 Application(s) Topical daily PRN Lip Dryness      ALLERGIES:  Allergies    penicillin (Rash)  vancomycin (Rash)    Intolerances        LABS:                        10.4   9.6   )-----------( 204      ( 14 Aug 2018 06:02 )             31.7     08-14    135  |  102  |  17  ----------------------------<  121<H>  3.2<L>   |  18<L>  |  0.99    Ca    8.7      14 Aug 2018 06:02  Mg     2.3     08-13    TPro  6.5  /  Alb  2.6<L>  /  TBili  1.7<H>  /  DBili  x   /  AST  158<H>  /  ALT  189<H>  /  AlkPhos  87  08-14        CAPILLARY BLOOD GLUCOSE      POCT Blood Glucose.: 97 mg/dL (14 Aug 2018 22:02)      RADIOLOGY & ADDITIONAL TESTS: Reviewed.    ASSESSMENT:    PLAN: Acceptance note: Transfer from [facility] to MICU   57yo  female PMHx asthma, COPD, current smoker (unspecified amount of years), no hospitalizations or intubations in the past presented to [facility] ER with SOB for the past 3 days.  She did not use her asthma pump in the past few days. She admited to having fevers at home and a nonpurulent cough.Upon arrival to [facility]  CXR showed LLL infiltrate. CT chest: LLL infiltrate with left mediastinal lymphadenopathy,  severe emphysema. In the ED T: 99HR: 109 RR: 22 BP: 123/70 SpO2: 91 on RA, subsequently placed on high flow.  Labs showing Wbc: 11.4 ,, ALT: 270. EKG showed prolong QT and sinus tachycardia, negative troponin She was given 1250mg vancomycin and 3.375 piperzillin/tazobactam. Pt was septic in setting of LLL pna  Pt  transferred to  [facility] for further management for CAP 2/2 in the setting of acute COPD exacerbation. Pt found to have positive legionella antigen and was started on iv Levaquin q daily . During hospital course pt noted to have increased frequency of agitation and resp distress, now transferred to MICU for further monitoring.     VITAL SIGNS:  Vital Signs Last 24 Hrs  T(C): 39.4 (14 Aug 2018 23:58), Max: 39.4 (14 Aug 2018 23:58)  T(F): 103 (14 Aug 2018 23:58), Max: 103 (14 Aug 2018 23:58)  HR: 117 (14 Aug 2018 21:55) (90 - 142)  BP: 165/99 (14 Aug 2018 20:21) (101/58 - 175/95)  BP(mean): 125 (14 Aug 2018 20:21) (74 - 128)  RR: 27 (14 Aug 2018 21:55) (21 - 32)  SpO2: 95% (14 Aug 2018 21:55) (85% - 95%)    PHYSICAL EXAM:    General: WDWN  HEENT: NC/AT; PERRL, anicteric sclera; MMM  Neck: supple  Cardiovascular: +S1/S2; RRR  Respiratory:b/l rhonchi noted, L >>R  Gastrointestinal: soft, NT/ND; +BSx4  Extremities: WWP; no edema, clubbing or cyanosis  Vascular: 2+ radial, DP/PT pulses B/L  Neurological: AOX3, pt appears very anxious     MEDICATIONS:  MEDICATIONS  (STANDING):  ALBUTerol/ipratropium for Nebulization 3 milliLiter(s) Nebulizer every 4 hours  dextrose 50% Injectable 12.5 Gram(s) IV Push once  dextrose 50% Injectable 25 Gram(s) IV Push once  dextrose 50% Injectable 25 Gram(s) IV Push once  haloperidol    Injectable 2.5 milliGRAM(s) IntraMuscular once  heparin  Injectable 5000 Unit(s) SubCutaneous every 8 hours  insulin lispro (HumaLOG) corrective regimen sliding scale   SubCutaneous Before meals and at bedtime  levoFLOXacin IVPB 750 milliGRAM(s) IV Intermittent every 24 hours  nicotine - 21 mG/24Hr(s) Patch 1 patch Transdermal daily  predniSONE   Tablet 40 milliGRAM(s) Oral daily  tiotropium 2.5 MICROgram(s)/olodaterol 2.5 MICROgram(s) (STIOLTO) Inhaler 2 Puff(s) Inhalation daily    MEDICATIONS  (PRN):  acetaminophen   Tablet 650 milliGRAM(s) Oral every 6 hours PRN For Temp greater than 38 C (100.4 F)  dextrose 40% Gel 15 Gram(s) Oral once PRN Blood Glucose LESS THAN 70 milliGRAM(s)/deciliter  diphenhydrAMINE   Capsule 25 milliGRAM(s) Oral every 4 hours PRN Rash and/or Itching  glucagon  Injectable 1 milliGRAM(s) IntraMuscular once PRN Glucose LESS THAN 70 milligrams/deciliter  oxyCODONE    5 mG/acetaminophen 325 mG 2 Tablet(s) Oral every 4 hours PRN Severe Pain (7 - 10)  petrolatum white Ointment 1 Application(s) Topical daily PRN Lip Dryness      ALLERGIES:  Allergies    penicillin (Rash)  vancomycin (Rash)    Intolerances        LABS:                        10.4   9.6   )-----------( 204      ( 14 Aug 2018 06:02 )             31.7     08-14    135  |  102  |  17  ----------------------------<  121<H>  3.2<L>   |  18<L>  |  0.99    Ca    8.7      14 Aug 2018 06:02  Mg     2.3     08-13    TPro  6.5  /  Alb  2.6<L>  /  TBili  1.7<H>  /  DBili  x   /  AST  158<H>  /  ALT  189<H>  /  AlkPhos  87  08-14        CAPILLARY BLOOD GLUCOSE      POCT Blood Glucose.: 97 mg/dL (14 Aug 2018 22:02)      RADIOLOGY & ADDITIONAL TESTS: Reviewed.    ASSESSMENT:    PLAN: Acceptance note: Transfer from [facility] to MICU   59yo  female PMHx asthma, COPD, current smoker (unspecified amount of years), no hospitalizations or intubations in the past presented to [facility] ER with SOB for the past 3 days.  She did not use her asthma pump in the past few days. She admited to having fevers at home and a nonpurulent cough.Upon arrival to [facility]  CXR showed LLL infiltrate. CT chest: LLL infiltrate with left mediastinal lymphadenopathy,  severe emphysema. In the ED T: 99HR: 109 RR: 22 BP: 123/70 SpO2: 91 on RA, subsequently placed on high flow.  Labs showing Wbc: 11.4 ,, ALT: 270. EKG showed prolong QT and sinus tachycardia, negative troponin She was given 1250mg vancomycin and 3.375 piperzillin/tazobactam. Pt was septic in setting of LLL pna  Pt  transferred to  [facility] for further management for CAP 2/2 in the setting of acute COPD exacerbation. Pt found to have positive legionella antigen and was started on iv Levaquin q daily . Patient transferred to ICU for increased oxygen requirements and possible intubation in the setting of COPD with legionella PNA.       VITAL SIGNS:  Vital Signs Last 24 Hrs  T(C): 39.4 (14 Aug 2018 23:58), Max: 39.4 (14 Aug 2018 23:58)  T(F): 103 (14 Aug 2018 23:58), Max: 103 (14 Aug 2018 23:58)  HR: 117 (14 Aug 2018 21:55) (90 - 142)  BP: 165/99 (14 Aug 2018 20:21) (101/58 - 175/95)  BP(mean): 125 (14 Aug 2018 20:21) (74 - 128)  RR: 27 (14 Aug 2018 21:55) (21 - 32)  SpO2: 95% (14 Aug 2018 21:55) (85% - 95%)    PHYSICAL EXAM:    General: WDWN  HEENT: NC/AT; PERRL, anicteric sclera; MMM  Neck: supple  Cardiovascular: +S1/S2; RRR  Respiratory:b/l rhonchi noted, L >>R  Gastrointestinal: soft, NT/ND; +BSx4  Extremities: WWP; no edema, clubbing or cyanosis  Vascular: 2+ radial, DP/PT pulses B/L  Neurological: AOX3, pt appears very anxious     MEDICATIONS:  MEDICATIONS  (STANDING):  ALBUTerol/ipratropium for Nebulization 3 milliLiter(s) Nebulizer every 4 hours  dextrose 50% Injectable 12.5 Gram(s) IV Push once  dextrose 50% Injectable 25 Gram(s) IV Push once  dextrose 50% Injectable 25 Gram(s) IV Push once  haloperidol    Injectable 2.5 milliGRAM(s) IntraMuscular once  heparin  Injectable 5000 Unit(s) SubCutaneous every 8 hours  insulin lispro (HumaLOG) corrective regimen sliding scale   SubCutaneous Before meals and at bedtime  levoFLOXacin IVPB 750 milliGRAM(s) IV Intermittent every 24 hours  nicotine - 21 mG/24Hr(s) Patch 1 patch Transdermal daily  predniSONE   Tablet 40 milliGRAM(s) Oral daily  tiotropium 2.5 MICROgram(s)/olodaterol 2.5 MICROgram(s) (STIOLTO) Inhaler 2 Puff(s) Inhalation daily    MEDICATIONS  (PRN):  acetaminophen   Tablet 650 milliGRAM(s) Oral every 6 hours PRN For Temp greater than 38 C (100.4 F)  dextrose 40% Gel 15 Gram(s) Oral once PRN Blood Glucose LESS THAN 70 milliGRAM(s)/deciliter  diphenhydrAMINE   Capsule 25 milliGRAM(s) Oral every 4 hours PRN Rash and/or Itching  glucagon  Injectable 1 milliGRAM(s) IntraMuscular once PRN Glucose LESS THAN 70 milligrams/deciliter  oxyCODONE    5 mG/acetaminophen 325 mG 2 Tablet(s) Oral every 4 hours PRN Severe Pain (7 - 10)  petrolatum white Ointment 1 Application(s) Topical daily PRN Lip Dryness      ALLERGIES:  Allergies    penicillin (Rash)  vancomycin (Rash)    Intolerances        LABS:                        10.4   9.6   )-----------( 204      ( 14 Aug 2018 06:02 )             31.7     08-14    135  |  102  |  17  ----------------------------<  121<H>  3.2<L>   |  18<L>  |  0.99    Ca    8.7      14 Aug 2018 06:02  Mg     2.3     08-13    TPro  6.5  /  Alb  2.6<L>  /  TBili  1.7<H>  /  DBili  x   /  AST  158<H>  /  ALT  189<H>  /  AlkPhos  87  08-14        CAPILLARY BLOOD GLUCOSE      POCT Blood Glucose.: 97 mg/dL (14 Aug 2018 22:02)      RADIOLOGY & ADDITIONAL TESTS: Reviewed.    ASSESSMENT:    PLAN: Acceptance note: Transfer from [facility] to MICU   57yo  female PMHx asthma, COPD, current smoker (unspecified amount of years), no hospitalizations or intubations in the past presented to [facility] ER with SOB for the past 3 days.  She did not use her asthma pump in the past few days. She admited to having fevers at home and a nonpurulent cough.Upon arrival to [facility]  CXR showed LLL infiltrate. CT chest: LLL infiltrate with left mediastinal lymphadenopathy,  severe emphysema. In the ED T: 99HR: 109 RR: 22 BP: 123/70 SpO2: 91 on RA, subsequently placed on high flow.  Labs showing Wbc: 11.4 ,, ALT: 270. EKG showed prolong QT and sinus tachycardia, negative troponin She was given 1250mg vancomycin and 3.375 piperzillin/tazobactam. Pt was septic in setting of LLL pna  Pt  transferred to  [facility] for further management for CAP 2/2 in the setting of acute COPD exacerbation. Pt found to have positive legionella antigen and was started on iv Levaquin q daily .  At around 12am today patient spiked temperature to 103 with altered mental status, mildly combative removing telemetry leads and pulse oxymetry, demanding to be discharged. Desaturating ot 80s on 50L HFNC , FiO2 65%.Patient transferred to ICU for increased oxygen requirements and possible intubation in the setting of COPD with legionella PNA.      VITAL SIGNS:  Vital Signs Last 24 Hrs  T(C): 39.4 (14 Aug 2018 23:58), Max: 39.4 (14 Aug 2018 23:58)  T(F): 103 (14 Aug 2018 23:58), Max: 103 (14 Aug 2018 23:58)  HR: 117 (14 Aug 2018 21:55) (90 - 142)  BP: 165/99 (14 Aug 2018 20:21) (101/58 - 175/95)  BP(mean): 125 (14 Aug 2018 20:21) (74 - 128)  RR: 27 (14 Aug 2018 21:55) (21 - 32)  SpO2: 95% (14 Aug 2018 21:55) (85% - 95%)    PHYSICAL EXAM:    General: WDWN  HEENT: NC/AT; PERRL, anicteric sclera; MMM  Neck: supple  Cardiovascular: +S1/S2; RRR  Respiratory:b/l rhonchi noted, L >>R  Gastrointestinal: soft, NT/ND; +BSx4  Extremities: WWP; no edema, clubbing or cyanosis  Vascular: 2+ radial, DP/PT pulses B/L  Neurological: AOX3, pt appears very anxious     MEDICATIONS:  MEDICATIONS  (STANDING):  ALBUTerol/ipratropium for Nebulization 3 milliLiter(s) Nebulizer every 4 hours  dextrose 50% Injectable 12.5 Gram(s) IV Push once  dextrose 50% Injectable 25 Gram(s) IV Push once  dextrose 50% Injectable 25 Gram(s) IV Push once  haloperidol    Injectable 2.5 milliGRAM(s) IntraMuscular once  heparin  Injectable 5000 Unit(s) SubCutaneous every 8 hours  insulin lispro (HumaLOG) corrective regimen sliding scale   SubCutaneous Before meals and at bedtime  levoFLOXacin IVPB 750 milliGRAM(s) IV Intermittent every 24 hours  nicotine - 21 mG/24Hr(s) Patch 1 patch Transdermal daily  predniSONE   Tablet 40 milliGRAM(s) Oral daily  tiotropium 2.5 MICROgram(s)/olodaterol 2.5 MICROgram(s) (STIOLTO) Inhaler 2 Puff(s) Inhalation daily    MEDICATIONS  (PRN):  acetaminophen   Tablet 650 milliGRAM(s) Oral every 6 hours PRN For Temp greater than 38 C (100.4 F)  dextrose 40% Gel 15 Gram(s) Oral once PRN Blood Glucose LESS THAN 70 milliGRAM(s)/deciliter  diphenhydrAMINE   Capsule 25 milliGRAM(s) Oral every 4 hours PRN Rash and/or Itching  glucagon  Injectable 1 milliGRAM(s) IntraMuscular once PRN Glucose LESS THAN 70 milligrams/deciliter  oxyCODONE    5 mG/acetaminophen 325 mG 2 Tablet(s) Oral every 4 hours PRN Severe Pain (7 - 10)  petrolatum white Ointment 1 Application(s) Topical daily PRN Lip Dryness      ALLERGIES:  Allergies    penicillin (Rash)  vancomycin (Rash)    Intolerances        LABS:                        10.4   9.6   )-----------( 204      ( 14 Aug 2018 06:02 )             31.7     08-14    135  |  102  |  17  ----------------------------<  121<H>  3.2<L>   |  18<L>  |  0.99    Ca    8.7      14 Aug 2018 06:02  Mg     2.3     08-13    TPro  6.5  /  Alb  2.6<L>  /  TBili  1.7<H>  /  DBili  x   /  AST  158<H>  /  ALT  189<H>  /  AlkPhos  87  08-14        CAPILLARY BLOOD GLUCOSE      POCT Blood Glucose.: 97 mg/dL (14 Aug 2018 22:02)      RADIOLOGY & ADDITIONAL TESTS: Reviewed.    ASSESSMENT:    PLAN:

## 2018-08-15 NOTE — PROGRESS NOTE ADULT - SUBJECTIVE AND OBJECTIVE BOX
INTERVAL HPI/OVERNIGHT EVENTS:    SUBJECTIVE: Patient seen and examined at bedside.    OBJECTIVE:    VITAL SIGNS:  ICU Vital Signs Last 24 Hrs  T(C): 39.4 (14 Aug 2018 23:58), Max: 39.4 (14 Aug 2018 23:58)  T(F): 103 (14 Aug 2018 23:58), Max: 103 (14 Aug 2018 23:58)  HR: 117 (14 Aug 2018 21:55) (90 - 142)  BP: 165/99 (14 Aug 2018 20:21) (101/58 - 175/95)  BP(mean): 125 (14 Aug 2018 20:21) (74 - 128)  ABP: --  ABP(mean): --  RR: 27 (14 Aug 2018 21:55) (21 - 32)  SpO2: 95% (14 Aug 2018 21:55) (85% - 95%)        08-13 @ 07:01  -  08-14 @ 07:00  --------------------------------------------------------  IN: 3850 mL / OUT: 0 mL / NET: 3850 mL    08-14 @ 07:01  -  08-15 @ 00:36  --------------------------------------------------------  IN: 1200 mL / OUT: 0 mL / NET: 1200 mL      CAPILLARY BLOOD GLUCOSE      POCT Blood Glucose.: 97 mg/dL (14 Aug 2018 22:02)      PHYSICAL EXAM:    General: NAD  HEENT: NC/AT; PERRL, clear conjunctiva  Neck: supple  Respiratory: CTA b/l  Cardiovascular: +S1/S2; RRR  Abdomen: soft, NT/ND; +BS x4  Extremities: WWP, 2+ peripheral pulses b/l; no LE edema  Skin: normal color and turgor; no rash  Neurological:     MEDICATIONS:  MEDICATIONS  (STANDING):  ALBUTerol/ipratropium for Nebulization 3 milliLiter(s) Nebulizer every 4 hours  dextrose 50% Injectable 12.5 Gram(s) IV Push once  dextrose 50% Injectable 25 Gram(s) IV Push once  dextrose 50% Injectable 25 Gram(s) IV Push once  haloperidol     Tablet 2.5 milliGRAM(s) Oral once  heparin  Injectable 5000 Unit(s) SubCutaneous every 8 hours  insulin lispro (HumaLOG) corrective regimen sliding scale   SubCutaneous Before meals and at bedtime  levoFLOXacin IVPB 750 milliGRAM(s) IV Intermittent every 24 hours  nicotine - 21 mG/24Hr(s) Patch 1 patch Transdermal daily  predniSONE   Tablet 40 milliGRAM(s) Oral daily  tiotropium 2.5 MICROgram(s)/olodaterol 2.5 MICROgram(s) (STIOLTO) Inhaler 2 Puff(s) Inhalation daily    MEDICATIONS  (PRN):  acetaminophen   Tablet 650 milliGRAM(s) Oral every 6 hours PRN For Temp greater than 38 C (100.4 F)  dextrose 40% Gel 15 Gram(s) Oral once PRN Blood Glucose LESS THAN 70 milliGRAM(s)/deciliter  diphenhydrAMINE   Capsule 25 milliGRAM(s) Oral every 4 hours PRN Rash and/or Itching  glucagon  Injectable 1 milliGRAM(s) IntraMuscular once PRN Glucose LESS THAN 70 milligrams/deciliter  oxyCODONE    5 mG/acetaminophen 325 mG 2 Tablet(s) Oral every 4 hours PRN Severe Pain (7 - 10)  petrolatum white Ointment 1 Application(s) Topical daily PRN Lip Dryness      ALLERGIES:  Allergies    penicillin (Rash)  vancomycin (Rash)    Intolerances        LABS:                        10.4   9.6   )-----------( 204      ( 14 Aug 2018 06:02 )             31.7     08-14    135  |  102  |  17  ----------------------------<  121<H>  3.2<L>   |  18<L>  |  0.99    Ca    8.7      14 Aug 2018 06:02  Mg     2.3     08-13    TPro  6.5  /  Alb  2.6<L>  /  TBili  1.7<H>  /  DBili  x   /  AST  158<H>  /  ALT  189<H>  /  AlkPhos  87  08-14          RADIOLOGY & ADDITIONAL TESTS: Reviewed.    ASSESSMENT:     PLAN:    FEN - no IVF; replete lytes prn; NPO    PPx - HSQ    CODE - FULL.    DISPO - continue telemetry monitoring in ICU-step down level of care on [facility]. *****Transfer Note 7L to MICU*****    Hospital Course  57yo  female PMHx asthma, COPD, current smoker (unspecified amount of years), no hospitalizations or intubations in the past presented to [facility] ER for SOB for the past 3 days.  She did not use her asthma pump in the past few days. She admits to having fevers at home and a nonpurulent cough. Pt states that this is the worst episode she has ever had and the reason why she presented to the [facility] ED. Denies any sick contacts, recent travel, chest pain, dizziness, headache, runny nose, orthopnea, change in urinary or bowel habits. CXR showed LLL infiltrate. CT chest: LLL with left mediastinal lympadenopathy, severe emphysema  In [facility] she was given 2x albuterol, duonebs, 125 mg methylprednisolone IVP, 2g Mag IVPB. In the ED T: 98 HR: 88 RR: 22 BP: 112/77 SpO2: 94.  Wbc: 11.4, no leukocytosis. Pt also had a transaminitis , ALT: 270. EKG showed prolong QT and sinus tachycardia, negative troponins. Given 1250mg vancomycin and 3.375 piperzillin/tazobactam. Azithromycin and FQ was not given in the setting of prolonged  . Pt was transferred to [facility] for further management for CAP 2/2 in the setting of acute COPD exacerbation.  8/13 Vanc/ Zosyn discontinued and patient started on Doxycyline and Cefipime. Patient spiked temperature to 104F, with an increase in oxygen requirement overnight. Patient was initially saturating in the low 90s on 50L FiO2 40, increased Fio2 to 78% with improvement in saturation. Patient was given a dose of Tylenol with ice packs and cooling blanket, repeat cultures drawn, lactate 2.7, ABG with respiratory alkalosis and PO2 55. Fever resolved with repeat vital signs T97.6 P91 /71 R25 94% on  8/14 Patient on cooling blanket, IVF maintenance fluids throughout the day. Antibiotics switched to Levaquin and Doxycycline. Patient lost IV access, new IV placed in the L arm. Patient spiked temp to 101.1F. given Tylenol, fever broke to 98.9F. At around 12am patient spiked temperature to 103 with altered mental status, mildly combative removing telemetry leads and pulse oxymetry, demanding to be discharged. Desaturating ot 80s on 50L HFNC , FiO2 65%. Urine found to be Legionella antigen positive. Haldol 0.5 ordered for agitation. MICU consulted. Patient transferred to ICU for increased oxygen requirements and possible intubation in the setting of COPD with legionella PNA.     SUBJECTIVE: Patient seen and examined at bedside. In significant distress, with family at bed side, demanding to leave the hospital.    OBJECTIVE:    VITAL SIGNS:  ICU Vital Signs Last 24 Hrs  T(C): 39.4 (14 Aug 2018 23:58), Max: 39.4 (14 Aug 2018 23:58)  T(F): 103 (14 Aug 2018 23:58), Max: 103 (14 Aug 2018 23:58)  HR: 117 (14 Aug 2018 21:55) (90 - 142)  BP: 165/99 (14 Aug 2018 20:21) (101/58 - 175/95)  BP(mean): 125 (14 Aug 2018 20:21) (74 - 128)  RR: 27 (14 Aug 2018 21:55) (21 - 32)  SpO2: 95% (14 Aug 2018 21:55) (85% - 95%)        08-13 @ 07:01 - 08-14 @ 07:00  --------------------------------------------------------  IN: 3850 mL / OUT: 0 mL / NET: 3850 mL    08-14 @ 07:01  -  08-15 @ 00:36  --------------------------------------------------------  IN: 1200 mL / OUT: 0 mL / NET: 1200 mL      CAPILLARY BLOOD GLUCOSE  POCT Blood Glucose.: 97 mg/dL (14 Aug 2018 22:02)    PHYSICAL EXAM:  Constitutional:  female, obese, non cooperative, on high flow NC, no accessory muscle use, able to speak in full sentences   HEENT: NCAT, anicteric, no conjunctival pallor, MMM, no oropharyngeal erythema or exudates  Lymph: No cervical or supraclavicular LAD  Respiratory: Course lung sound in the LLL  Cardiovascular: tachycardic, regular rhythm, no m/r/g appreciated   Gastrointestinal: soft, NTND, no masses palpable, BS normal  : no fajardo in place  Extremities: Warm, well perfused, no edema, no clubbing, 2+ pulses  Neurological: AAOx3, CN II-XII grossly intact  Skin: WWP    MEDICATIONS:  MEDICATIONS  (STANDING):  ALBUTerol/ipratropium for Nebulization 3 milliLiter(s) Nebulizer every 4 hours  dextrose 50% Injectable 12.5 Gram(s) IV Push once  haloperidol     Tablet 2.5 milliGRAM(s) Oral once  heparin  Injectable 5000 Unit(s) SubCutaneous every 8 hours  insulin lispro (HumaLOG) corrective regimen sliding scale   SubCutaneous Before meals and at bedtime  levoFLOXacin IVPB 750 milliGRAM(s) IV Intermittent every 24 hours  nicotine - 21 mG/24Hr(s) Patch 1 patch Transdermal daily  predniSONE   Tablet 40 milliGRAM(s) Oral daily  tiotropium 2.5 MICROgram(s)/olodaterol 2.5 MICROgram(s) (STIOLTO) Inhaler 2 Puff(s) Inhalation daily    MEDICATIONS  (PRN):  acetaminophen   Tablet 650 milliGRAM(s) Oral every 6 hours PRN For Temp greater than 38 C (100.4 F)  dextrose 40% Gel 15 Gram(s) Oral once PRN Blood Glucose LESS THAN 70 milliGRAM(s)/deciliter  diphenhydrAMINE   Capsule 25 milliGRAM(s) Oral every 4 hours PRN Rash and/or Itching  glucagon  Injectable 1 milliGRAM(s) IntraMuscular once PRN Glucose LESS THAN 70 milligrams/deciliter  oxyCODONE    5 mG/acetaminophen 325 mG 2 Tablet(s) Oral every 4 hours PRN Severe Pain (7 - 10)  petrolatum white Ointment 1 Application(s) Topical daily PRN Lip Dryness      ALLERGIES:  penicillin (Rash)  vancomycin (Rash)      LABS:                        10.4   9.6   )-----------( 204      ( 14 Aug 2018 06:02 )             31.7     08-14    135  |  102  |  17  ----------------------------<  121<H>  3.2<L>   |  18<L>  |  0.99    Ca    8.7      14 Aug 2018 06:02  Mg     2.3     08-13    TPro  6.5  /  Alb  2.6<L>  /  TBili  1.7<H>  /  DBili  x   /  AST  158<H>  /  ALT  189<H>  /  AlkPhos  87  08-14      RADIOLOGY & ADDITIONAL TESTS:   UA: Leigonella antigen +    8 /14  CXR: Persistent hazy opacities within the lower lung zones bilaterally.   Persistent trace right pleural effusion and small to moderate left   pleural effusion..     Blood Cx: NGTD *****Transfer Note  to MICU*****    Hospital Course  57yo  female PMHx asthma, COPD, current smoker (unspecified amount of years), no hospitalizations or intubations in the past presented to [facility] ER for SOB for the past 3 days. She admits to having fevers at home and a nonpurulent cough. Denies any sick contacts, recent travel, chest pain, dizziness, headache, runny nose, orthopnea, change in urinary or bowel habits. CXR with LLL infiltrate. CT chest: LLL with left mediastinal lympadenopathy, severe emphysema. In [facility] was given 2x albuterol, duonebs, 125 mg methylprednisolone IVP, 2g Mag IVPB. In the ED T: 98 HR: 88 RR: 22 BP: 112/77 SpO2: 94.  Wbc: 11.4, no leukocytosis. Pt with transaminitis , ALT: 270. EKG: prolong QT and sinus tachycardia, negative troponins. Given 1250mg vancomycin and 3.375 piperzillin/tazobactam. Azithromycin and FQ was not given in the setting of prolonged  . Pt was transferred to  for further management for CAP 2/2 in the setting of acute COPD exacerbation.  8/13 Vanc/ Zosyn discontinued and patient started on Doxycyline and Cefipime. Patient spiked temperature to 104F, with an increase in oxygen requirement overnight. Patient was initially saturating in the low 90s on 50L FiO2 40, increased Fio2 to 78% with improvement in saturation. Patient was given a dose of Tylenol with ice packs and cooling blanket, repeat cultures drawn, lactate 2.7, ABG with respiratory alkalosis and PO2 55. Fever resolved with repeat vital signs T97.6 P91 /71 R25 94%  8/14 Patient on cooling blanket, maintenance fluids throughout the day. Antibiotics switched to Levaquin and Doxycycline. Patient lost IV access, new IV placed in the L arm. Patient spiked temp to 101.1F. given Tylenol, fever broke to 98.9F. At around 12am patient spiked temperature to 103 with altered mental status, mildly combative removing telemetry leads and pulse oxymetry, demanding to be discharged. Desaturating ot 80s on 50L HFNC , FiO2 65%. Urine found to be Legionella antigen positive. Haldol 0.5 ordered for agitation. MICU consulted. Patient transferred to ICU for increased oxygen requirements and possible intubation in the setting of COPD with legionella PNA.     SUBJECTIVE: Patient seen and examined at bedside. In significant distress, with family at bed side, demanding to leave the hospital.    OBJECTIVE:    VITAL SIGNS:  ICU Vital Signs Last 24 Hrs  T(C): 39.4 (14 Aug 2018 23:58), Max: 39.4 (14 Aug 2018 23:58)  T(F): 103 (14 Aug 2018 23:58), Max: 103 (14 Aug 2018 23:58)  HR: 117 (14 Aug 2018 21:55) (90 - 142)  BP: 165/99 (14 Aug 2018 20:21) (101/58 - 175/95)  BP(mean): 125 (14 Aug 2018 20:21) (74 - 128)  RR: 27 (14 Aug 2018 21:55) (21 - 32)  SpO2: 95% (14 Aug 2018 21:55) (85% - 95%)        08-13 @ 07:01 - 08-14 @ 07:00  --------------------------------------------------------  IN: 3850 mL / OUT: 0 mL / NET: 3850 mL    08-14 @ 07:01 - 08-15 @ 00:36  --------------------------------------------------------  IN: 1200 mL / OUT: 0 mL / NET: 1200 mL      CAPILLARY BLOOD GLUCOSE  POCT Blood Glucose.: 97 mg/dL (14 Aug 2018 22:02)    PHYSICAL EXAM:  Constitutional:  female, obese, non cooperative, on high flow NC, no accessory muscle use, able to speak in full sentences   HEENT: NCAT, anicteric, no conjunctival pallor, MMM, no oropharyngeal erythema or exudates  Lymph: No cervical or supraclavicular LAD  Respiratory: Course lung sound in the LLL  Cardiovascular: tachycardic, regular rhythm, no m/r/g appreciated   Gastrointestinal: soft, NTND, no masses palpable, BS normal  : no fajardo in place  Extremities: Warm, well perfused, no edema, no clubbing, 2+ pulses  Neurological: AAOx3, CN II-XII grossly intact  Skin: WWP    MEDICATIONS:  MEDICATIONS  (STANDING):  ALBUTerol/ipratropium for Nebulization 3 milliLiter(s) Nebulizer every 4 hours  dextrose 50% Injectable 12.5 Gram(s) IV Push once  haloperidol     Tablet 2.5 milliGRAM(s) Oral once  heparin  Injectable 5000 Unit(s) SubCutaneous every 8 hours  insulin lispro (HumaLOG) corrective regimen sliding scale   SubCutaneous Before meals and at bedtime  levoFLOXacin IVPB 750 milliGRAM(s) IV Intermittent every 24 hours  nicotine - 21 mG/24Hr(s) Patch 1 patch Transdermal daily  predniSONE   Tablet 40 milliGRAM(s) Oral daily  tiotropium 2.5 MICROgram(s)/olodaterol 2.5 MICROgram(s) (STIOLTO) Inhaler 2 Puff(s) Inhalation daily    MEDICATIONS  (PRN):  acetaminophen   Tablet 650 milliGRAM(s) Oral every 6 hours PRN For Temp greater than 38 C (100.4 F)  dextrose 40% Gel 15 Gram(s) Oral once PRN Blood Glucose LESS THAN 70 milliGRAM(s)/deciliter  diphenhydrAMINE   Capsule 25 milliGRAM(s) Oral every 4 hours PRN Rash and/or Itching  glucagon  Injectable 1 milliGRAM(s) IntraMuscular once PRN Glucose LESS THAN 70 milligrams/deciliter  oxyCODONE    5 mG/acetaminophen 325 mG 2 Tablet(s) Oral every 4 hours PRN Severe Pain (7 - 10)  petrolatum white Ointment 1 Application(s) Topical daily PRN Lip Dryness      ALLERGIES:  penicillin (Rash)  vancomycin (Rash)      LABS:                        10.4   9.6   )-----------( 204      ( 14 Aug 2018 06:02 )             31.7     08-14    135  |  102  |  17  ----------------------------<  121<H>  3.2<L>   |  18<L>  |  0.99    Ca    8.7      14 Aug 2018 06:02  Mg     2.3     08-13    TPro  6.5  /  Alb  2.6<L>  /  TBili  1.7<H>  /  DBili  x   /  AST  158<H>  /  ALT  189<H>  /  AlkPhos  87  08-14      RADIOLOGY & ADDITIONAL TESTS:   UA: Leigonella antigen +    8 /14  CXR: Persistent hazy opacities within the lower lung zones bilaterally.   Persistent trace right pleural effusion and small to moderate left   pleural effusion..     Blood Cx: NGTD

## 2018-08-15 NOTE — PROVIDER CONTACT NOTE (CHANGE IN STATUS NOTIFICATION) - ASSESSMENT
Patient to Patient and family refusing any needlesticks not done by "specialist." Will not allow RN to insert peripheral IV or draw labs. Physicians made aware, they spoke to family extensively. Refusing Central line insertion. Agreed to PIV done by physician with ultrasound.

## 2018-08-15 NOTE — PROCEDURE NOTE - PROCEDURE
<<-----Click on this checkbox to enter Procedure Endotracheal intubation  08/15/2018    Active  [id number]

## 2018-08-15 NOTE — PROCEDURE NOTE - PROCEDURE
<<-----Click on this checkbox to enter Procedure Central line placement  08/15/2018    Active  [id number]

## 2018-08-15 NOTE — PROGRESS NOTE ADULT - PROBLEM SELECTOR PLAN 5
Pt noted to have elevated CK, unclear etiology. No recent falls. (Increasing). Most likely in the setting of worsening sepsis.   [phone]  -f/u CK levels  -f/u UA, myoglobin Pt noted to have elevated CK, unclear etiology. No recent falls. (Increasing). Most likely in the setting of worsening sepsis.   [phone]  -f/u CK levels

## 2018-08-15 NOTE — PROGRESS NOTE ADULT - PROBLEM SELECTOR PLAN 3
Patient p/w progressive SOB, requiring frequent rescue inhaler at home, admitted for COPD exacerbation 2/2 CAP (LLL infiltrate on CXR). Lungs clear without wheezes. s/p multiple runs of duonebs, [id number], SoluMedrol 125 mg IVP x1 in ED, transferred to [facility] from [facility] withi non-rebreather mask. Switched to HFNC upon arrival, saturating well.  -started 40mg prednisone po for 5 days (day 1/5)  -started stiolto today  -c/w HIFLO NC and wean as tolerated, keep SpO2 between 88-92%  -c/w duonebs  -pt will need to f/u with outpatient pulmonology upon discharge Patient p/w progressive SOB, requiring frequent rescue inhaler at home, admitted for COPD exacerbation 2/2 CAP (LLL infiltrate on CXR). Lungs clear without wheezes. s/p multiple runs of duonebs, [id number], SoluMedrol 125 mg IVP x1 in ED, transferred to [facility] from [facility] withi non-rebreather mask. Switched to HFNC upon arrival, saturating well. Now with increased need for oxygen.  - On 40mg prednisone po for 5 days (day 1/5)  -started stiolto today  -c/w HIFLO NC and wean as tolerated, keep SpO2 between 88-92%,   -c/w duonebs  -pt will need to f/u with outpatient pulmonology upon discharge

## 2018-08-15 NOTE — DIETITIAN INITIAL EVALUATION ADULT. - PROBLEM SELECTOR PLAN 4
sCr 1.36 on admission, unknown baseline, unclear etiology at this time, possibly pre-renal 2/2 insensible losses as patient endorsing fevers at home vs intrinsic given elevated CK, ?Rhabdomyolysis  -obtain UA, Urine studies, calculate FeNa  -Continue to trend sCr with daily CMPs  -Avoid nephrotoxic agents

## 2018-08-15 NOTE — DIETITIAN INITIAL EVALUATION ADULT. - PROBLEM SELECTOR PLAN 2
CAP Pneumonia in the setting of COPD exacerbation  History of tobacco use and COPD and first hospitalization. CXR demonstrating LLL infiltrate. Most likely CAP in the setting of COPD exacerbation, afebrile on admission, wbc count unremarkable, not septic. Less likely on differential: TB, malignancy, CHF. Malignancy cannot be excluded based on CT scan. Pulmonary embolism ruled out on CT scan.   -CXR: LLL infiltrate  - f/u blood cx  - Given prolonged QTC, not a candidate for Macrolide or FQ. c/w vancomycin 1250 mg IV every 12 hours and 3.375 g zosyn q6h  -Will consider repeat CT a few days later to exclude possibility of any malignant mass after finish course of antibiotics. Consider pulm consult in AM

## 2018-08-15 NOTE — CHART NOTE - NSCHARTNOTEFT_GEN_A_CORE
EVENT NOTE PGY-2    Event: Called to patient's bedside initially around 8PM for HR up to 130s. Patient noted to have desaturations to mid 80s and mild tachypnea. HFNC on at 40L/min and 50FIO2. Noted that nasal cannula- only in one nostril. CXR performed and afebrile at time. Lungs with noted crackles b/l with rhonchi on left to mid thorax with +egophony. No accessory muscle use at time. Later in evening.     Assessment:    PE:    Vitals:T(F): 103 (08-14-18 @ 23:58)  HR: 117 (08-14-18 @ 21:55)  BP: 165/99 (08-14-18 @ 20:21)  RR: 27 (08-14-18 @ 21:55)  SpO2: 95% (08-14-18 @ 21:55)    Interventions performed:    Plan: EVENT NOTE PGY-2    Event: Called to patient's bedside initially around 8PM for HR up to 130s. Patient noted to have desaturations to mid 80s and mild tachypnea. HFNC on at 40L/min and 50FIO2. Noted that nasal cannula- only in one nostril. CXR performed and afebrile at time. Lungs with noted crackles b/l with rhonchi on left to mid thorax with +egophony. No accessory muscle use at time. Fluids initially discontinued for concern of potential overload. CXR with unchanged with significant LLL infiltrate, vascular congestion.  Noted some improvement in HR to 115-120. Around 9PM patient acutely agitated stating she did not feel safe, pulling off HFNC for which nurse manager called and at bedside during evaluation of patient. Patient states she feels unsafe because of how many times she was requiring blood draws/IV sticks (for which noted to be difficult access previously in the day). Patient concerned that blood draws and IV attempts were malicious and an attempt to get back at her. Patient stated she wanted to call an ambulance to bring her to a different hospital. Assured patient the need for IV access and blood draws are necessary for continued monitoring in setting of sepsis 2/2 to PNA as well as her worsening respiratory status. Patient initially cooperative. Shortly after reassurance, called to bedside again for acute agitation for which patient ripped of clothes, HFNC and monitors stating that she would leave. Discussed with patient that in her acute state the dangers of leaving without proper stabilization- discussed dangers including worsening sepsis, respiratory failure that could potentially require intubation and even death. Patients states she did not care and would walk out regardless, daughters at bedside concerned about patient and attempting to convince to stay. ICU resident and attending made aware of situation and assessed at bedside. Daughters able to redirect patient intermittently but patient intermittently noncompliant with O2 and monitors. Patient noted to spike fever Axillary to 103F ,-140s. Given tylenol. Of note, during attempt to redirect, Attempted to obtain EKG initially for which patient initially refused. Given IM haldol for agitation for which patient with some improvement.       Assessment:    PE:    Vitals:T(F): 103 (08-14-18 @ 23:58)  HR: 117 (08-14-18 @ 21:55)  BP: 165/99 (08-14-18 @ 20:21)  RR: 27 (08-14-18 @ 21:55)  SpO2: 95% (08-14-18 @ 21:55)    General: Mild tachypnea with some respiratory accessory muscle use      Plan: EVENT NOTE PGY-2  Called to patient's bedside initially around 8PM for HR up to 130s. Patient noted to have desaturations to mid 80s and mild tachypnea. HFNC on at 40L/min and 50FIO2. Noted that nasal cannula- only in one nostril. CXR performed and afebrile at time. Lungs with noted crackles b/l with rhonchi on left to mid thorax with +egophony. No accessory muscle use at time. Fluids initially discontinued for concern of potential overload. CXR with unchanged with significant LLL infiltrate, vascular congestion.  Noted some improvement in HR to 115-120. Around 9PM patient acutely agitated stating she did not feel safe, pulling off HFNC for which nurse manager called and at bedside during evaluation of patient. Patient states she feels unsafe because of how many times she was requiring blood draws/IV sticks (for which noted to be difficult access previously in the day). Patient concerned that blood draws and IV attempts were malicious and an attempt to get back at her. Patient stated she wanted to call an ambulance to bring her to a different hospital. Assured patient the need for IV access and blood draws are necessary for continued monitoring in setting of sepsis 2/2 to PNA as well as her worsening respiratory status. Patient initially cooperative. Shortly after reassurance, called to bedside again for acute agitation for which patient ripped of clothes, HFNC and monitors stating that she would leave. Discussed with patient that in her acute state the dangers of leaving without proper stabilization- discussed dangers including worsening sepsis, respiratory failure that could potentially require intubation and even death. Patients states she did not care and would walk out regardless, daughters at bedside concerned about patient and attempting to convince to stay. ICU resident and attending made aware of situation and assessed at bedside. Daughters able to redirect patient intermittently but patient intermittently noncompliant with supplemental O2 and monitors. Also, attempt at EKG for which patient refused (no documented EKG in charts to evaluate for QTc in setting for need for potential haldol, etc). Upon discussion with patient and family, again discussed need for compliance with treatment to avoid worsening infection and respiratory failure as previously outlined. Patient noted to spike fever Axillary to 103F ,-140s. Given tylenol. Of note, report from lab during this time that patient + with legionella in urine. Upon discussion with family and patient, decision to transfer patient to ICU for further HD monitoring for potential impending intubation (in setting of intermittent refusal). EKG performed with Sinus tachycardia with QTc 422. Given IM haldol for agitation for which patient with some improvement. Patient transferred to ICU for worsening respiratory status 2/2 to noncompliance of current regimen.     Vitals:T(F): 103 (08-14-18 @ 23:58)  HR: 117 (08-14-18 @ 21:55)  BP: 165/99 (08-14-18 @ 20:21)  RR: 27 (08-14-18 @ 21:55)  SpO2: 95% (08-14-18 @ 21:55)  General: Mild tachypnea with some respiratory accessory muscle use, speaking full sentences  HEENT: No pallor noted. EOM grossly intact. Oral mucosa moist.   Respiratory: crackles at b/l bases, more pronounced on R. Significant rhonchi on L side with + egophony on L.    Cardiac: S1, S2, Tachycardia. No appreciated murmur.   Extremities: No Edema, DP pulses intact- WWP. Hot to touch.    Plan: Transfer to MICU for closer HD and respiratory monitoring with worsening respiratory failure 2/2 to noncompliance with supplemental O2. In setting of sepsis, noted significant LLL infiltrate for which patient remains on IV levofloxacin- now with + Urine legionella. Will obtain sputum cultures.

## 2018-08-15 NOTE — PROGRESS NOTE ADULT - SUBJECTIVE AND OBJECTIVE BOX
INTERVAL HPI/OVERNIGHT EVENTS: Pt admitted to MICU overnight.    SUBJECTIVE: Patient seen and examined at bedside. Pt states her breathing is comfortable on high flow NC.     OBJECTIVE:    VITAL SIGNS:  ICU Vital Signs Last 24 Hrs  T(C): 37.8 (15 Aug 2018 06:00), Max: 40 (15 Aug 2018 01:00)  T(F): 100 (15 Aug 2018 06:00), Max: 104 (15 Aug 2018 01:00)  HR: 87 (15 Aug 2018 06:21) (72 - 142)  BP: 98/56 (15 Aug 2018 06:00) (77/47 - 175/95)  BP(mean): 72 (15 Aug 2018 06:00) (54 - 128)  ABP: --  ABP(mean): --  RR: 25 (15 Aug 2018 06:21) (20 - 32)  SpO2: 97% (15 Aug 2018 06:21) (85% - 100%)        08-14 @ 07:01  -  08-15 @ 07:00  --------------------------------------------------------  IN: 4300 mL / OUT: 600 mL / NET: 3700 mL      CAPILLARY BLOOD GLUCOSE      POCT Blood Glucose.: 84 mg/dL (15 Aug 2018 06:07)      PHYSICAL EXAM:  General: WDWN  HEENT: NC/AT; PERRL, anicteric sclera; MMM  Neck: supple, no JVD  Cardiovascular: +S1/S2; RRR  Respiratory:b/l rhonchi noted, L >>R, + expiratory wheezes b/l  Gastrointestinal: soft, NT/ND; +BSx4  Extremities: WWP; no edema, clubbing or cyanosis  Vascular: 2+ radial, DP/PT pulses B/L  Neurological: AOX3, pt appears very anxious       MEDICATIONS:  MEDICATIONS  (STANDING):  ALBUTerol/ipratropium for Nebulization 3 milliLiter(s) Nebulizer every 4 hours  chlorhexidine 2% Cloths 1 Application(s) Topical daily  dextrose 50% Injectable 12.5 Gram(s) IV Push once  dextrose 50% Injectable 25 Gram(s) IV Push once  dextrose 50% Injectable 25 Gram(s) IV Push once  heparin  Injectable 5000 Unit(s) SubCutaneous every 8 hours  insulin lispro (HumaLOG) corrective regimen sliding scale   SubCutaneous Before meals and at bedtime  levoFLOXacin IVPB 750 milliGRAM(s) IV Intermittent every 24 hours  methylPREDNISolone sodium succinate Injectable 40 milliGRAM(s) IV Push every 8 hours  nicotine - 21 mG/24Hr(s) Patch 1 patch Transdermal daily  potassium chloride    Tablet ER 40 milliEquivalent(s) Oral once  tiotropium 2.5 MICROgram(s)/olodaterol 2.5 MICROgram(s) (STIOLTO) Inhaler 2 Puff(s) Inhalation daily    MEDICATIONS  (PRN):  acetaminophen   Tablet 650 milliGRAM(s) Oral every 6 hours PRN For Temp greater than 38 C (100.4 F)  dextrose 40% Gel 15 Gram(s) Oral once PRN Blood Glucose LESS THAN 70 milliGRAM(s)/deciliter  diphenhydrAMINE   Capsule 25 milliGRAM(s) Oral every 4 hours PRN Rash and/or Itching  glucagon  Injectable 1 milliGRAM(s) IntraMuscular once PRN Glucose LESS THAN 70 milligrams/deciliter  oxyCODONE    5 mG/acetaminophen 325 mG 2 Tablet(s) Oral every 4 hours PRN Severe Pain (7 - 10)  petrolatum white Ointment 1 Application(s) Topical daily PRN Lip Dryness      ALLERGIES:  Allergies    penicillin (Rash)  vancomycin (Rash)    Intolerances        LABS:                        10.4   10.2  )-----------( 209      ( 15 Aug 2018 02:56 )             32.2     08-15    136  |  104  |  19  ----------------------------<  98  3.5   |  17<L>  |  0.87    Ca    8.8      15 Aug 2018 02:57  Mg     2.2     08-15    TPro  6.3  /  Alb  2.6<L>  /  TBili  1.6<H>  /  DBili  x   /  AST  93<H>  /  ALT  141<H>  /  AlkPhos  94  08-15      Legionella pneumophila Antigen, Urine (08.14.18 @ 16:38)    Legionella Antigen, Urine: Positive: TYPE:(C=Critical, N=Notification, A=Abnormal) C  TESTS: LEGU  DATE/TIME CALLED: 08/14/18 23:11  CALLED TO: Ms. LA NENA Arenas RN([id number])  READ BACK (2 Patient Identifiers)(Y/N): Y  READ BACK VALUES (Y/N): Y  CALLED BY: RACHAEL Wolfe    Rapid Respiratory Viral Panel (08.15.18 @ 01:44)    Rapid RVP Result: NotDetec: The FilmArray RVP Rapid uses polymerase chain reaction (PCR) and melt  curve analysis to screen for adenovirus; coronavirus HKU1, NL63, 229E,  OC43; human metapneumovirus (hMPV); human enterovirus/rhinovirus  (Entero/RV); influenza A; influenza A/H1;influenza A/H3; influenza  A/H1-2009; influenza B; parainfluenza viruses 1, 2, 3, 4; respiratory  syncytial virus; Bordetella pertussis; Mycoplasma pneumoniae; and  Chlamydophila pneumoniae.    Creatine Kinase, Serum in AM (08.15.18 @ 02:57)    Creatine Kinase, Serum: 5197 U/L    RADIOLOGY & ADDITIONAL TESTS: Reviewed.    < from: Xray Chest 1 View-PORTABLE IMMEDIATE (08.15.18 @ 06:30) >  IMPRESSION:  1.  Persistent hazy opacities within the lower lung zones bilaterally.   2.  Persistent trace right pleural effusion and moderate left pleural   effusion.    < end of copied text > INTERVAL HPI/OVERNIGHT EVENTS: Pt admitted to MICU overnight.    SUBJECTIVE: Patient seen and examined at bedside. S/p intubation, sedated.    OBJECTIVE:    VITAL SIGNS:  ICU Vital Signs Last 24 Hrs  T(C): 37.8 (15 Aug 2018 06:00), Max: 40 (15 Aug 2018 01:00)  T(F): 100 (15 Aug 2018 06:00), Max: 104 (15 Aug 2018 01:00)  HR: 87 (15 Aug 2018 06:21) (72 - 142)  BP: 98/56 (15 Aug 2018 06:00) (77/47 - 175/95)  BP(mean): 72 (15 Aug 2018 06:00) (54 - 128)  ABP: --  ABP(mean): --  RR: 25 (15 Aug 2018 06:21) (20 - 32)  SpO2: 97% (15 Aug 2018 06:21) (85% - 100%)        08-14 @ 07:01  -  08-15 @ 07:00  --------------------------------------------------------  IN: 4300 mL / OUT: 600 mL / NET: 3700 mL        CAPILLARY BLOOD GLUCOSE  POCT Blood Glucose.: 84 mg/dL (15 Aug 2018 06:07)          MEDICATIONS:  MEDICATIONS  (STANDING):  ALBUTerol/ipratropium for Nebulization 3 milliLiter(s) Nebulizer every 4 hours  chlorhexidine 2% Cloths 1 Application(s) Topical daily  dextrose 50% Injectable 12.5 Gram(s) IV Push once  dextrose 50% Injectable 25 Gram(s) IV Push once  dextrose 50% Injectable 25 Gram(s) IV Push once  heparin  Injectable 5000 Unit(s) SubCutaneous every 8 hours  insulin lispro (HumaLOG) corrective regimen sliding scale   SubCutaneous Before meals and at bedtime  levoFLOXacin IVPB 750 milliGRAM(s) IV Intermittent every 24 hours  methylPREDNISolone sodium succinate Injectable 40 milliGRAM(s) IV Push every 8 hours  nicotine - 21 mG/24Hr(s) Patch 1 patch Transdermal daily  potassium chloride    Tablet ER 40 milliEquivalent(s) Oral once  tiotropium 2.5 MICROgram(s)/olodaterol 2.5 MICROgram(s) (STIOLTO) Inhaler 2 Puff(s) Inhalation daily    MEDICATIONS  (PRN):  acetaminophen   Tablet 650 milliGRAM(s) Oral every 6 hours PRN For Temp greater than 38 C (100.4 F)  dextrose 40% Gel 15 Gram(s) Oral once PRN Blood Glucose LESS THAN 70 milliGRAM(s)/deciliter  diphenhydrAMINE   Capsule 25 milliGRAM(s) Oral every 4 hours PRN Rash and/or Itching  glucagon  Injectable 1 milliGRAM(s) IntraMuscular once PRN Glucose LESS THAN 70 milligrams/deciliter  oxyCODONE    5 mG/acetaminophen 325 mG 2 Tablet(s) Oral every 4 hours PRN Severe Pain (7 - 10)  petrolatum white Ointment 1 Application(s) Topical daily PRN Lip Dryness      ALLERGIES:  Allergies    penicillin (Rash)  vancomycin (Rash)    Intolerances      PHYSICAL EXAM:    General: WDWN  HEENT: NC/AT; PERRL, anicteric sclera; MMM  Neck: supple  Cardiovascular: +S1/S2; RRR  Respiratory:b/l rhonchi noted, L >>R; b/l wheezes  Gastrointestinal: soft, NT/ND; +BSx4  Extremities: WWP; no edema, clubbing or cyanosis  Vascular: 2+ radial, DP/PT pulses B/L  Neurological: AOX3, pt appears very anxious         LABS:                        10.4   10.2  )-----------( 209      ( 15 Aug 2018 02:56 )             32.2     08-15    136  |  104  |  19  ----------------------------<  98  3.5   |  17<L>  |  0.87    Ca    8.8      15 Aug 2018 02:57  Mg     2.2     08-15    TPro  6.3  /  Alb  2.6<L>  /  TBili  1.6<H>  /  DBili  x   /  AST  93<H>  /  ALT  141<H>  /  AlkPhos  94  08-15      Legionella pneumophila Antigen, Urine (08.14.18 @ 16:38)    Legionella Antigen, Urine: Positive: TYPE:(C=Critical, N=Notification, A=Abnormal) C  TESTS: LEGU  DATE/TIME CALLED: 08/14/18 23:11  CALLED TO: Ms. LA NENA Arenas RN([id number])  READ BACK (2 Patient Identifiers)(Y/N): Y  READ BACK VALUES (Y/N): Y  CALLED BY: RACHAEL Wolfe    Rapid Respiratory Viral Panel (08.15.18 @ 01:44)    Rapid RVP Result: NotDetec: The FilmArray RVP Rapid uses polymerase chain reaction (PCR) and melt  curve analysis to screen for adenovirus; coronavirus HKU1, NL63, 229E,  OC43; human metapneumovirus (hMPV); human enterovirus/rhinovirus  (Entero/RV); influenza A; influenza A/H1;influenza A/H3; influenza  A/H1-2009; influenza B; parainfluenza viruses 1, 2, 3, 4; respiratory  syncytial virus; Bordetella pertussis; Mycoplasma pneumoniae; and  Chlamydophila pneumoniae.    Creatine Kinase, Serum in AM (08.15.18 @ 02:57)    Creatine Kinase, Serum: 5197 U/L    RADIOLOGY & ADDITIONAL TESTS: Reviewed.    < from: Xray Chest 1 View-PORTABLE IMMEDIATE (08.15.18 @ 06:30) >  IMPRESSION:  1.  Persistent hazy opacities within the lower lung zones bilaterally.   2.  Persistent trace right pleural effusion and moderate left pleural   effusion.    < end of copied text >

## 2018-08-15 NOTE — DIETITIAN INITIAL EVALUATION ADULT. - PROBLEM SELECTOR PLAN 7
Pt states she drank last Thursday a 1/2 pint of alcohol and drinks weekly. Unknown history of liver pathology.  -f/u abdominal ultrasound to rule out and liver lesions  -Continue to trend LFTs with daily labs

## 2018-08-15 NOTE — PROGRESS NOTE ADULT - ASSESSMENT
57yo  female PMHx asthma, COPD, current smoker unspecified amount of PPD), no hospitalizations or intubations presents with SOB, nonpurulent cough for a few days, found to have a LLL infiltrate, admitted and managed for severe sepsis 2/2 to legionella pneumonia in the setting of COPD. Worsening respiratory status/agitation now step up to MICU for further monitoring      Pulmonology   #Hypoxic resp failure secondary to legionella pneumonia in the setting of COPD exacerbation   - CXR demonstrating LLL infiltrate.   - S/p intubation for tachypnea, increased WOB and increasing O2 demand while on bipap  - Vent settings: Tv 400, FiO2 40%, RR12, PEEP 5  - On propofol and fentanyl for sedation  - Secretions with lauren blood indicating potentially necrotizing infection  - Consider upgrading abx from Levaquin to Zyvox in setting of potential necrotizing PNA in setting of PCN allergy  - Frequent suctioning  - F/u sputum cx, sputum PCR     # COPD exacerbation  - Pt has hx of COPD , current smoker   - Solumedrol 40mg IVP q8  - Keep SpO2 between 88-92%  c/w tiotropium/olodaterol  inhaler   c/w Duoneb PRN  f/u pulm upon discharge       Infectious disease  #Sepsis   - Pt was septic upon admission, etiology secondary to legionella LLL PNA, s/p 1 L fluid bolus   - c/w Levaquin 750 mg IVPB, will consider broadening abx for potential necrotizing infxn  - Legionella urine Ag positive, follow up sputum cx  - RVP negative  - Pt spiking fevers overnight and during the day to 103, 104  - Will send UA, blood cx, sputum cx  - Cooling blanket for now  - iv Tylenol stat      GI  #ELEVATED LFTS     - workup so far showing hepatitis panel negative, acetaminophen levels normal  - etiology likely related to legionella infection  -Continue to trend LFTs with daily labs    #Metabolic   - Problem: Elevated CK.  Plan: Pt noted to have elevated CK  - C/w maintenance IVF @ 100cc NS/hr   - Etiology secondary to rhabdo in setting of legionella  - Continue to trend CK     # Renal   CLARISSA (acute kidney injury).  RESOLVED   sCr 1.36 on admission, CLARISSA most likely in the setting of dehydration and sepsis  -Creatinine 0.99 <- 1.36      F: IVF maintenance NS @100cc/hr  E replete as necessary, K>4, Mg>2   Nutrition: OG tube in place  D: MICU     DVT ppx: heparin 5000 subQ q8h, SCD   Code: Full 59yo  female PMHx asthma, COPD, current smoker unspecified amount of PPD), no hospitalizations or intubations presents with SOB, nonpurulent cough for a few days, found to have a LLL infiltrate, admitted and managed for severe sepsis 2/2 to legionella pneumonia in the setting of COPD. Worsening respiratory status/agitation now step up to MICU for further monitoring      Pulmonology   #Hypoxic resp failure secondary to legionella pneumonia in the setting of COPD exacerbation   - CXR demonstrating LLL infiltrate.   - S/p intubation for tachypnea, increased WOB and increasing O2 demand while on bipap  - Vent settings: Tv 400, FiO2 40%, RR12, PEEP 5  - On propofol and fentanyl for sedation  - Secretions with lauren blood indicating potentially necrotizing infection  - +legionella urine antigen  - C/w levaquin  - Frequent suctioning  - F/u sputum cx, sputum PCR     # COPD exacerbation  - Pt has hx of COPD , current smoker   - Solumedrol 40mg IVP q8  - Keep SpO2 between 88-92%  - c/w tiotropium/olodaterol  inhaler   - c/w Duoneb PRN  - f/u pulm upon discharge       Infectious disease  #Sepsis   - Pt was septic upon admission, etiology secondary to legionella LLL PNA, s/p 1 L fluid bolus   - c/w Levaquin 750 mg IVPB  - Legionella urine Ag positive, follow up sputum cx  - RVP negative  - Pt spiking fevers overnight and during the day to 103, 104  - Will send UA, blood cx, sputum cx  - Cooling blanket for now  - iv Tylenol stat      GI  #ELEVATED LFTS     - workup so far showing hepatitis panel negative, acetaminophen levels normal  - etiology likely related to legionella infection  -Continue to trend LFTs with daily labs    #Metabolic   - Problem: Elevated CK.  Plan: Pt noted to have elevated CK  - C/w maintenance IVF @ 100cc NS/hr   - Etiology secondary to rhabdo in setting of legionella  - Continue to trend CK     # Renal   CLARISSA (acute kidney injury).  RESOLVED   sCr 1.36 on admission, CLARISSA most likely in the setting of dehydration and sepsis  -Creatinine 0.99 <- 1.36      F: IVF maintenance NS @100cc/hr  E replete as necessary, K>4, Mg>2   Nutrition: OG tube in place  D: MICU     DVT ppx: heparin 5000 subQ q8h, SCD   Code: Full

## 2018-08-16 LAB
ALBUMIN SERPL ELPH-MCNC: 2.2 G/DL — LOW (ref 3.3–5)
ALP SERPL-CCNC: 111 U/L — SIGNIFICANT CHANGE UP (ref 40–120)
ALT FLD-CCNC: 114 U/L — HIGH (ref 10–45)
ANION GAP SERPL CALC-SCNC: 14 MMOL/L — SIGNIFICANT CHANGE UP (ref 5–17)
AST SERPL-CCNC: 45 U/L — HIGH (ref 10–40)
BASOPHILS NFR BLD AUTO: 0.1 % — SIGNIFICANT CHANGE UP (ref 0–2)
BILIRUB SERPL-MCNC: 0.8 MG/DL — SIGNIFICANT CHANGE UP (ref 0.2–1.2)
BUN SERPL-MCNC: 15 MG/DL — SIGNIFICANT CHANGE UP (ref 7–23)
CALCIUM SERPL-MCNC: 9.1 MG/DL — SIGNIFICANT CHANGE UP (ref 8.4–10.5)
CHLORIDE SERPL-SCNC: 102 MMOL/L — SIGNIFICANT CHANGE UP (ref 96–108)
CK SERPL-CCNC: 1822 U/L — HIGH (ref 25–170)
CO2 SERPL-SCNC: 20 MMOL/L — LOW (ref 22–31)
CREAT SERPL-MCNC: 0.67 MG/DL — SIGNIFICANT CHANGE UP (ref 0.5–1.3)
EOSINOPHIL NFR BLD AUTO: 0.1 % — SIGNIFICANT CHANGE UP (ref 0–6)
GLUCOSE BLDC GLUCOMTR-MCNC: 127 MG/DL — HIGH (ref 70–99)
GLUCOSE BLDC GLUCOMTR-MCNC: 143 MG/DL — HIGH (ref 70–99)
GLUCOSE BLDC GLUCOMTR-MCNC: 163 MG/DL — HIGH (ref 70–99)
GLUCOSE BLDC GLUCOMTR-MCNC: 207 MG/DL — HIGH (ref 70–99)
GLUCOSE SERPL-MCNC: 227 MG/DL — HIGH (ref 70–99)
GRAM STN FLD: SIGNIFICANT CHANGE UP
GRAM STN FLD: SIGNIFICANT CHANGE UP
HBA1C BLD-MCNC: 5.6 % — SIGNIFICANT CHANGE UP (ref 4–5.6)
HCT VFR BLD CALC: 33.6 % — LOW (ref 34.5–45)
HGB BLD-MCNC: 10.7 G/DL — LOW (ref 11.5–15.5)
LYMPHOCYTES # BLD AUTO: 8.4 % — LOW (ref 13–44)
MAGNESIUM SERPL-MCNC: 2.2 MG/DL — SIGNIFICANT CHANGE UP (ref 1.6–2.6)
MCHC RBC-ENTMCNC: 25.9 PG — LOW (ref 27–34)
MCHC RBC-ENTMCNC: 31.8 G/DL — LOW (ref 32–36)
MCV RBC AUTO: 81.4 FL — SIGNIFICANT CHANGE UP (ref 80–100)
MONOCYTES NFR BLD AUTO: 2.7 % — SIGNIFICANT CHANGE UP (ref 2–14)
NEUTROPHILS NFR BLD AUTO: 88.7 % — HIGH (ref 43–77)
PHOSPHATE SERPL-MCNC: 2.5 MG/DL — SIGNIFICANT CHANGE UP (ref 2.5–4.5)
PLATELET # BLD AUTO: 255 K/UL — SIGNIFICANT CHANGE UP (ref 150–400)
POTASSIUM SERPL-MCNC: 4.3 MMOL/L — SIGNIFICANT CHANGE UP (ref 3.5–5.3)
POTASSIUM SERPL-SCNC: 4.3 MMOL/L — SIGNIFICANT CHANGE UP (ref 3.5–5.3)
PROT SERPL-MCNC: 6.1 G/DL — SIGNIFICANT CHANGE UP (ref 6–8.3)
RBC # BLD: 4.13 M/UL — SIGNIFICANT CHANGE UP (ref 3.8–5.2)
RBC # FLD: 16.4 % — SIGNIFICANT CHANGE UP (ref 10.3–16.9)
SODIUM SERPL-SCNC: 136 MMOL/L — SIGNIFICANT CHANGE UP (ref 135–145)
SPECIMEN SOURCE: SIGNIFICANT CHANGE UP
TROPONIN T SERPL-MCNC: <0.01 NG/ML — SIGNIFICANT CHANGE UP (ref 0–0.01)
WBC # BLD: 14.9 K/UL — HIGH (ref 3.8–10.5)
WBC # FLD AUTO: 14.9 K/UL — HIGH (ref 3.8–10.5)

## 2018-08-16 PROCEDURE — 71045 X-RAY EXAM CHEST 1 VIEW: CPT | Mod: 26

## 2018-08-16 PROCEDURE — 99291 CRITICAL CARE FIRST HOUR: CPT

## 2018-08-16 RX ORDER — CHLORHEXIDINE GLUCONATE 213 G/1000ML
15 SOLUTION TOPICAL
Qty: 0 | Refills: 0 | Status: DISCONTINUED | OUTPATIENT
Start: 2018-08-16 | End: 2018-08-17

## 2018-08-16 RX ORDER — PANTOPRAZOLE SODIUM 20 MG/1
40 TABLET, DELAYED RELEASE ORAL DAILY
Qty: 0 | Refills: 0 | Status: DISCONTINUED | OUTPATIENT
Start: 2018-08-16 | End: 2018-08-17

## 2018-08-16 RX ORDER — ACETAMINOPHEN 500 MG
650 TABLET ORAL EVERY 6 HOURS
Qty: 0 | Refills: 0 | Status: DISCONTINUED | OUTPATIENT
Start: 2018-08-16 | End: 2018-08-17

## 2018-08-16 RX ORDER — INSULIN LISPRO 100/ML
VIAL (ML) SUBCUTANEOUS EVERY 6 HOURS
Qty: 0 | Refills: 0 | Status: DISCONTINUED | OUTPATIENT
Start: 2018-08-16 | End: 2018-08-17

## 2018-08-16 RX ADMIN — HEPARIN SODIUM 5000 UNIT(S): 5000 INJECTION INTRAVENOUS; SUBCUTANEOUS at 06:42

## 2018-08-16 RX ADMIN — Medication 3 MILLILITER(S): at 21:13

## 2018-08-16 RX ADMIN — HEPARIN SODIUM 5000 UNIT(S): 5000 INJECTION INTRAVENOUS; SUBCUTANEOUS at 14:08

## 2018-08-16 RX ADMIN — CHLORHEXIDINE GLUCONATE 1 APPLICATION(S): 213 SOLUTION TOPICAL at 06:41

## 2018-08-16 RX ADMIN — Medication 40 MILLIGRAM(S): at 06:42

## 2018-08-16 RX ADMIN — FENTANYL CITRATE 4.43 MICROGRAM(S)/KG/HR: 50 INJECTION INTRAVENOUS at 02:16

## 2018-08-16 RX ADMIN — Medication 1 PATCH: at 12:05

## 2018-08-16 RX ADMIN — CHLORHEXIDINE GLUCONATE 15 MILLILITER(S): 213 SOLUTION TOPICAL at 17:57

## 2018-08-16 RX ADMIN — Medication 4: at 06:42

## 2018-08-16 RX ADMIN — HEPARIN SODIUM 5000 UNIT(S): 5000 INJECTION INTRAVENOUS; SUBCUTANEOUS at 23:12

## 2018-08-16 RX ADMIN — Medication 40 MILLIGRAM(S): at 23:11

## 2018-08-16 RX ADMIN — PROPOFOL 2.66 MICROGRAM(S)/KG/MIN: 10 INJECTION, EMULSION INTRAVENOUS at 10:30

## 2018-08-16 RX ADMIN — Medication 3 MILLILITER(S): at 12:16

## 2018-08-16 RX ADMIN — Medication 3 MILLILITER(S): at 16:33

## 2018-08-16 RX ADMIN — Medication 3 MILLILITER(S): at 07:00

## 2018-08-16 RX ADMIN — Medication 2: at 12:04

## 2018-08-16 RX ADMIN — PANTOPRAZOLE SODIUM 40 MILLIGRAM(S): 20 TABLET, DELAYED RELEASE ORAL at 12:04

## 2018-08-16 RX ADMIN — Medication 40 MILLIGRAM(S): at 14:07

## 2018-08-16 RX ADMIN — Medication 3 MILLILITER(S): at 02:33

## 2018-08-16 NOTE — CHART NOTE - NSCHARTNOTEFT_GEN_A_CORE
Called to bedside to evaluate pt. Informed by nurse that pt was expressing anxiety and family in room stating that the pt wanted to leave AMA. Evaluated pt at bedside. Pt on fentanyl and propofol but awake and responsive to verbal stimuli by nodding and shaking head in response to questions. Pt denied pain, denied discomfort from the ETT tube, denied difficulty with breathing, denied chest pain. Pt was writing on a notepad, numerous nonsensical phrases, with numerous miss-spelled words/letters, no full or coherent sentences, trying to express herself. Phrases included something regarding a gonzales turning around, power shut down, a tube and a heart. When asked to look through additional pages that patient had written on, family refused to let me observe. Pt was able to communicate in full sentences with no difficulty yesterday prior to being sedated with propofol and fentanyl for intubation. Family members at bedside stating that pt wants the ET tube out and wants to leave AMA, pt nodding in agreement with all statements from family.     Fellow, , nurse manager called to bedside. Decision made that only fellow and attending are to see/communicate with the patient and family.
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
## 2018-08-16 NOTE — PROGRESS NOTE ADULT - SUBJECTIVE AND OBJECTIVE BOX
INTERVAL HPI/OVERNIGHT EVENTS: No acute events overnight. Still with intermittently bloody secretions from the ETT.    SUBJECTIVE: Patient seen and examined at bedside. Pt awake, responsive to commands, and appears comfortable. Tolerating CPAP.    OBJECTIVE:    VITAL SIGNS:  ICU Vital Signs Last 24 Hrs  T(C): 36.4 (16 Aug 2018 06:30), Max: 39.9 (15 Aug 2018 12:00)  T(F): 97.5 (16 Aug 2018 06:30), Max: 103.8 (15 Aug 2018 12:00)  HR: 82 (16 Aug 2018 07:00) (66 - 135)  BP: 114/73 (16 Aug 2018 07:00) (74/49 - 180/104)  BP(mean): 86 (16 Aug 2018 07:00) (57 - 138)  ABP: --  ABP(mean): --  RR: 16 (16 Aug 2018 07:00) (13 - 40)  SpO2: 93% (16 Aug 2018 07:00) (90% - 97%)    Mode: AC/ CMV (Assist Control/ Continuous Mandatory Ventilation), RR (machine): 16, TV (machine): 400, FiO2: 45, PEEP: 7, ITime: 0.9, MAP: 11, PIP: 18    08-15 @ 07:01 - 08-16 @ 07:00  --------------------------------------------------------  IN: 3333.5 mL / OUT: 1838 mL / NET: 1495.5 mL    08-16 @ 07:01  -  08-16 @ 08:06  --------------------------------------------------------  IN: 30 mL / OUT: 0 mL / NET: 30 mL      CAPILLARY BLOOD GLUCOSE  207 (16 Aug 2018 06:00)      POCT Blood Glucose.: 207 mg/dL (16 Aug 2018 06:20)      PHYSICAL EXAM:  General: WDWN, comfortable appearing  HEENT: NC/AT; PERRL, anicteric sclera; MMM  Neck: supple  Cardiovascular: +S1/S2; RRR  Respiratory: mild b/l rhonchi noted L>R, no wheezes or crackles breathing non labored, non-tachypneic  Gastrointestinal: soft, NT/ND; +BSx4  Extremities: WWP; no edema, clubbing or cyanosis  Vascular: 2+ radial, DP/PT pulses B/L  Neurological: AOX3, pt appears very anxious         MEDICATIONS:  MEDICATIONS  (STANDING):  ALBUTerol/ipratropium for Nebulization 3 milliLiter(s) Nebulizer every 4 hours  chlorhexidine 0.12% Liquid 15 milliLiter(s) Swish and Spit two times a day  chlorhexidine 2% Cloths 1 Application(s) Topical daily  dextrose 50% Injectable 12.5 Gram(s) IV Push once  dextrose 50% Injectable 25 Gram(s) IV Push once  dextrose 50% Injectable 25 Gram(s) IV Push once  fentaNYL   Infusion. 0.5 MICROgram(s)/kG/Hr (4.435 mL/Hr) IV Continuous <Continuous>  heparin  Injectable 5000 Unit(s) SubCutaneous every 8 hours  insulin lispro (HumaLOG) corrective regimen sliding scale   SubCutaneous Before meals and at bedtime  levoFLOXacin IVPB 750 milliGRAM(s) IV Intermittent every 24 hours  methylPREDNISolone sodium succinate Injectable 40 milliGRAM(s) IV Push every 8 hours  nicotine - 21 mG/24Hr(s) Patch 1 patch Transdermal daily  norepinephrine Infusion 0.05 MICROgram(s)/kG/Min (8.316 mL/Hr) IV Continuous <Continuous>  propofol Infusion 5 MICROgram(s)/kG/Min (2.661 mL/Hr) IV Continuous <Continuous>    MEDICATIONS  (PRN):  acetaminophen   Tablet 650 milliGRAM(s) Oral every 6 hours PRN For Temp greater than 38 C (100.4 F)  dextrose 40% Gel 15 Gram(s) Oral once PRN Blood Glucose LESS THAN 70 milliGRAM(s)/deciliter  glucagon  Injectable 1 milliGRAM(s) IntraMuscular once PRN Glucose LESS THAN 70 milligrams/deciliter  petrolatum white Ointment 1 Application(s) Topical daily PRN Lip Dryness      ALLERGIES:  Allergies    Cucumber (Unknown)  penicillin (Rash)  vancomycin (Rash)    Intolerances        LABS:                        10.7   14.9  )-----------( 255      ( 16 Aug 2018 05:41 )             33.6     08-16    136  |  102  |  15  ----------------------------<  227<H>  4.3   |  20<L>  |  0.67    Ca    9.1      16 Aug 2018 05:41  Phos  2.5     08-16  Mg     2.2     08-16    TPro  6.1  /  Alb  2.2<L>  /  TBili  0.8  /  DBili  x   /  AST  45<H>  /  ALT  114<H>  /  AlkPhos  111  08-16      Urinalysis Basic - ( 15 Aug 2018 10:20 )    Color: Yellow / Appearance: Clear / SG: >=1.030 / pH: x  Gluc: x / Ketone: NEGATIVE  / Bili: Small / Urobili: 0.2 E.U./dL   Blood: x / Protein: 30 mg/dL / Nitrite: NEGATIVE   Leuk Esterase: NEGATIVE / RBC: 5-10 /HPF / WBC < 5 /HPF   Sq Epi: x / Non Sq Epi: 5-10 /HPF / Bacteria: Present /HPF    Culture - Blood (08.15.18 @ 04:10)    Specimen Source: .Blood Blood-Peripheral    Culture Results:   No growth at 1 day.    Culture - Blood (08.14.18 @ 00:13)    Specimen Source: .Blood RVE    Culture Results:   No growth at 2 days.    Culture - Blood (08.12.18 @ 15:50)    Specimen Source: .Blood Blood    Culture Results:   No growth to date.    Culture - Blood (08.12.18 @ 15:50)    Specimen Source: .Blood Blood    Culture Results:   No growth to date.    Lactate, Blood (08.15.18 @ 17:54)    Lactate, Blood: 1.2 mmoL/L    Blood Gas Profile - Venous (08.15.18 @ 17:51)    pH, Venous: 7.30    pCO2, Venous: 42 mmHg    pO2, Venous: 51 mmHg    HCO3, Venous: 20 mmol/L    Base Excess, Venous: -6.2 mmol/L    Oxygen Saturation, Venous: 78 %    Blood Gas Source Venous: BLDA    Blood Gas Profile - Arterial (08.15.18 @ 16:11)    pH, Arterial: 7.29    pCO2, Arterial: 39 mmHg    pO2, Arterial: 80 mmHg    HCO3, Arterial: 18 mmol/L    Base Excess, Arterial: -7.9 mmol/L    Oxygen Saturation, Arterial: 95 %    Blood Gas Source Arterial: BLDA            RADIOLOGY & ADDITIONAL TESTS: Reviewed. INTERVAL HPI/OVERNIGHT EVENTS: No acute events overnight. Still with intermittently bloody secretions from the ETT.    SUBJECTIVE: Patient seen and examined at bedside. Pt awake, responsive to commands, and appears comfortable. Tolerating CPAP.    OBJECTIVE:    VITAL SIGNS:  ICU Vital Signs Last 24 Hrs  T(C): 36.4 (16 Aug 2018 06:30), Max: 39.9 (15 Aug 2018 12:00)  T(F): 97.5 (16 Aug 2018 06:30), Max: 103.8 (15 Aug 2018 12:00)  HR: 82 (16 Aug 2018 07:00) (66 - 135)  BP: 114/73 (16 Aug 2018 07:00) (74/49 - 180/104)  BP(mean): 86 (16 Aug 2018 07:00) (57 - 138)  ABP: --  ABP(mean): --  RR: 16 (16 Aug 2018 07:00) (13 - 40)  SpO2: 93% (16 Aug 2018 07:00) (90% - 97%)    Mode: AC/ CMV (Assist Control/ Continuous Mandatory Ventilation), RR (machine): 16, TV (machine): 400, FiO2: 45, PEEP: 7, ITime: 0.9, MAP: 11, PIP: 18    08-15 @ 07:01 - 08-16 @ 07:00  --------------------------------------------------------  IN: 3333.5 mL / OUT: 1838 mL / NET: 1495.5 mL    08-16 @ 07:01  -  08-16 @ 08:06  --------------------------------------------------------  IN: 30 mL / OUT: 0 mL / NET: 30 mL      CAPILLARY BLOOD GLUCOSE  207 (16 Aug 2018 06:00)      POCT Blood Glucose.: 207 mg/dL (16 Aug 2018 06:20)      PHYSICAL EXAM:  General: WDWN, comfortable appearing  HEENT: NC/AT; PERRL, anicteric sclera; MMM  Neck: supple  Cardiovascular: +S1/S2; RRR  Respiratory: mild b/l rhonchi noted L>R, no wheezes or crackles breathing non labored, non-tachypneic  Gastrointestinal: soft, NT/ND; +BSx4  Extremities: WWP; no edema, clubbing or cyanosis  Vascular: 2+ radial, DP/PT pulses B/L  Neurological: AOX3, pt appears very anxious         MEDICATIONS:  MEDICATIONS  (STANDING):  ALBUTerol/ipratropium for Nebulization 3 milliLiter(s) Nebulizer every 4 hours  chlorhexidine 0.12% Liquid 15 milliLiter(s) Swish and Spit two times a day  chlorhexidine 2% Cloths 1 Application(s) Topical daily  dextrose 50% Injectable 12.5 Gram(s) IV Push once  dextrose 50% Injectable 25 Gram(s) IV Push once  dextrose 50% Injectable 25 Gram(s) IV Push once  fentaNYL   Infusion. 0.5 MICROgram(s)/kG/Hr (4.435 mL/Hr) IV Continuous <Continuous>  heparin  Injectable 5000 Unit(s) SubCutaneous every 8 hours  insulin lispro (HumaLOG) corrective regimen sliding scale   SubCutaneous Before meals and at bedtime  levoFLOXacin IVPB 750 milliGRAM(s) IV Intermittent every 24 hours  methylPREDNISolone sodium succinate Injectable 40 milliGRAM(s) IV Push every 8 hours  nicotine - 21 mG/24Hr(s) Patch 1 patch Transdermal daily  norepinephrine Infusion 0.05 MICROgram(s)/kG/Min (8.316 mL/Hr) IV Continuous <Continuous>  propofol Infusion 5 MICROgram(s)/kG/Min (2.661 mL/Hr) IV Continuous <Continuous>    MEDICATIONS  (PRN):  acetaminophen   Tablet 650 milliGRAM(s) Oral every 6 hours PRN For Temp greater than 38 C (100.4 F)  dextrose 40% Gel 15 Gram(s) Oral once PRN Blood Glucose LESS THAN 70 milliGRAM(s)/deciliter  glucagon  Injectable 1 milliGRAM(s) IntraMuscular once PRN Glucose LESS THAN 70 milligrams/deciliter  petrolatum white Ointment 1 Application(s) Topical daily PRN Lip Dryness      ALLERGIES:  Allergies    Cucumber (Unknown)  penicillin (Rash)  vancomycin (Rash)    Intolerances        LABS:                        10.7   14.9  )-----------( 255      ( 16 Aug 2018 05:41 )             33.6     08-16    136  |  102  |  15  ----------------------------<  227<H>  4.3   |  20<L>  |  0.67    Ca    9.1      16 Aug 2018 05:41  Phos  2.5     08-16  Mg     2.2     08-16    TPro  6.1  /  Alb  2.2<L>  /  TBili  0.8  /  DBili  x   /  AST  45<H>  /  ALT  114<H>  /  AlkPhos  111  08-16      Urinalysis Basic - ( 15 Aug 2018 10:20 )    Color: Yellow / Appearance: Clear / SG: >=1.030 / pH: x  Gluc: x / Ketone: NEGATIVE  / Bili: Small / Urobili: 0.2 E.U./dL   Blood: x / Protein: 30 mg/dL / Nitrite: NEGATIVE   Leuk Esterase: NEGATIVE / RBC: 5-10 /HPF / WBC < 5 /HPF   Sq Epi: x / Non Sq Epi: 5-10 /HPF / Bacteria: Present /HPF    Culture - Blood (08.15.18 @ 04:10)    Specimen Source: .Blood Blood-Peripheral    Culture Results:   No growth at 1 day.    Culture - Blood (08.14.18 @ 00:13)    Specimen Source: .Blood RVE    Culture Results:   No growth at 2 days.    Culture - Blood (08.12.18 @ 15:50)    Specimen Source: .Blood Blood    Culture Results:   No growth to date.    Culture - Blood (08.12.18 @ 15:50)    Specimen Source: .Blood Blood    Culture Results:   No growth to date.    Culture - Sputum . (08.15.18 @ 22:09)    Gram Stain:   Rare epithelial cells  Moderate White blood cells  No organisms seen    Specimen Source: .Sputum Sputum    Culture Results:   No growth to date.      Lactate, Blood (08.15.18 @ 17:54)    Lactate, Blood: 1.2 mmoL/L    Blood Gas Profile - Venous (08.15.18 @ 17:51)    pH, Venous: 7.30    pCO2, Venous: 42 mmHg    pO2, Venous: 51 mmHg    HCO3, Venous: 20 mmol/L    Base Excess, Venous: -6.2 mmol/L    Oxygen Saturation, Venous: 78 %    Blood Gas Source Venous: BLDA    Blood Gas Profile - Arterial (08.15.18 @ 16:11)    pH, Arterial: 7.29    pCO2, Arterial: 39 mmHg    pO2, Arterial: 80 mmHg    HCO3, Arterial: 18 mmol/L    Base Excess, Arterial: -7.9 mmol/L    Oxygen Saturation, Arterial: 95 %    Blood Gas Source Arterial: BLDA        RADIOLOGY & ADDITIONAL TESTS: Reviewed.    < from: Xray Chest 1 View-PORTABLE IMMEDIATE (08.15.18 @ 06:30) >  IMPRESSION:  1.  Persistenthazy opacities within the lower lung zones bilaterally,   slightly increased on the left compared to the prior study.   2.  Persistent trace right pleural effusion and moderate left pleural   effusion.    < end of copied text >

## 2018-08-16 NOTE — PROGRESS NOTE ADULT - ASSESSMENT
57yo  female PMHx asthma, COPD, current smoker unspecified amount of PPD), no hospitalizations or intubations who presented with SOB, nonpurulent cough for a few days, found to have a LLL infiltrate, admitted and managed for severe sepsis 2/2 to legionella pneumonia in the setting of COPD exacerbation, s/p intubation for worsening respiratory status    Pulmonology   #Hypoxic resp failure secondary to legionella pneumonia in the setting of COPD exacerbation   - CXR demonstrating LLL infiltrate slightly increased in size and persistent b/l pleural effusions L>R  - +Legionella urine antigen  - S/p intubation for tachypnea, increased WOB and increasing O2 demand while on bipap  - Currently tolerating CPAP trial  - On propofol and fentanyl for sedation  - Secretions still with lauren blood indicating potentially necrotizing infection  - Consider bronchoscopy this am  - C/w levaquin  - Frequent suctioning  - F/u final sputum cx (currently NGTD), sputum PCR     # COPD exacerbation  - Pt has hx of COPD , current smoker   - C/w solumedrol 40mg IVP q8  - Keep SpO2 between 88-92%  - c/w tiotropium/olodaterol  inhaler   - c/w Duoneb PRN  - f/u pulm upon discharge       Infectious disease  #Sepsis   - Pt was septic upon admission, etiology secondary to legionella LLL PNA, s/p 1 L fluid bolus  - VBG and ABG done yesterday, showing hyperdynamic circulation likely due to setting of sepsis  - Pt was afebrile overnight (last fever was 8/15 at 12 noon)    - Legionella urine Ag positive, follow up final sputum cx from 8/15 (currently no growth x1 day)  - RVP negative  - UA negative  - F/u final blood cx, sputum cx  - Tylenol prn for fever  - c/w Levaquin 750 mg IVPB    GI  #ELEVATED LFTS     - Downtrending  - Hepatitis panel negative, acetaminophen levels normal  - Etiology likely related to legionella infection  - Continue to trend LFTs with daily labs    Metabolic   #Elevated CK  - Pt noted to have elevated CK on admission >5000, currently downtrending (1822 today)  - Etiology secondary to rhabdo in setting of legionella  - C/w OGT feeds  - Continue to trend CK     F: no IVF  E replete as necessary, K>4, Mg>2   Nutrition: OG tube feeds, currently being held for possible bronchoscopy today  D: MICU     DVT ppx: heparin 5000 subQ q8h, SCD   Code: Full 59yo  female PMHx asthma, COPD, current smoker unspecified amount of PPD), no hospitalizations or intubations who presented with SOB, nonpurulent cough for a few days, found to have a LLL infiltrate, admitted and managed for severe sepsis 2/2 to legionella pneumonia in the setting of COPD exacerbation, s/p intubation for worsening respiratory status    Pulmonology   #Hypoxic resp failure secondary to legionella pneumonia in the setting of COPD exacerbation   - CXR demonstrating LLL infiltrate slightly increased in size and persistent b/l pleural effusions L>R  - +Legionella urine antigen  - S/p intubation for tachypnea, increased WOB and increasing O2 demand while on bipap  - Currently tolerating CPAP trial  - On propofol and fentanyl for sedation  - Secretions still with lauren blood indicating potentially necrotizing infection, but tapering off  - C/w levaquin  - Continue to monitor secretions closely  - F/u final sputum cx (currently NGTD), sputum PCR     # COPD exacerbation  - Pt has hx of COPD , current smoker   - C/w solumedrol 40mg IVP q8  - Keep SpO2 between 88-92%  - c/w tiotropium/olodaterol  inhaler   - c/w Duoneb PRN  - f/u pulm upon discharge       Infectious disease  #Sepsis   - Pt was septic upon admission, etiology secondary to legionella LLL PNA, s/p 1 L fluid bolus  - VBG and ABG done yesterday, showing hyperdynamic circulation likely due to setting of sepsis  - Pt was afebrile overnight (last fever was 8/15 at 12 noon)    - Legionella urine Ag positive, follow up final sputum cx from 8/15 (currently no growth x1 day)  - RVP negative  - UA negative  - F/u final blood cx, sputum cx  - Tylenol prn for fever  - c/w Levaquin 750 mg IVPB  - [facility] notified    GI  #ELEVATED LFTS     - Downtrending  - Hepatitis panel negative, acetaminophen levels normal  - Etiology likely related to legionella infection  - Continue to trend LFTs with daily labs    Metabolic   #Elevated CK  - Pt noted to have elevated CK on admission >5000, downtrending (1822 today)  - Etiology secondary to rhabdo in setting of legionella  - C/w OGT feeds  - No need to continue trending CK    F: no IVF  E replete as necessary, K>4, Mg>2   Nutrition: c/w OG tube feeds  D: MICU     DVT ppx: heparin 5000 subQ q8h, SCD   Code: Full

## 2018-08-17 ENCOUNTER — TRANSCRIPTION ENCOUNTER (OUTPATIENT)
Age: 58
End: 2018-08-17

## 2018-08-17 ENCOUNTER — INBOUND DOCUMENT (OUTPATIENT)
Age: 58
End: 2018-08-17

## 2018-08-17 VITALS — TEMPERATURE: 98 F

## 2018-08-17 LAB
ALBUMIN SERPL ELPH-MCNC: 2.4 G/DL — LOW (ref 3.3–5)
ALP SERPL-CCNC: 145 U/L — HIGH (ref 40–120)
ALT FLD-CCNC: 104 U/L — HIGH (ref 10–45)
ANION GAP SERPL CALC-SCNC: 14 MMOL/L — SIGNIFICANT CHANGE UP (ref 5–17)
AST SERPL-CCNC: 60 U/L — HIGH (ref 10–40)
BASOPHILS NFR BLD AUTO: 0.3 % — SIGNIFICANT CHANGE UP (ref 0–2)
BILIRUB SERPL-MCNC: 0.6 MG/DL — SIGNIFICANT CHANGE UP (ref 0.2–1.2)
BUN SERPL-MCNC: 16 MG/DL — SIGNIFICANT CHANGE UP (ref 7–23)
CALCIUM SERPL-MCNC: 9.4 MG/DL — SIGNIFICANT CHANGE UP (ref 8.4–10.5)
CHLORIDE SERPL-SCNC: 103 MMOL/L — SIGNIFICANT CHANGE UP (ref 96–108)
CO2 SERPL-SCNC: 22 MMOL/L — SIGNIFICANT CHANGE UP (ref 22–31)
CREAT SERPL-MCNC: 0.71 MG/DL — SIGNIFICANT CHANGE UP (ref 0.5–1.3)
CULTURE RESULTS: NO GROWTH — SIGNIFICANT CHANGE UP
CULTURE RESULTS: SIGNIFICANT CHANGE UP
CULTURE RESULTS: SIGNIFICANT CHANGE UP
EOSINOPHIL NFR BLD AUTO: 0.1 % — SIGNIFICANT CHANGE UP (ref 0–6)
GLUCOSE BLDC GLUCOMTR-MCNC: 124 MG/DL — HIGH (ref 70–99)
GLUCOSE BLDC GLUCOMTR-MCNC: 126 MG/DL — HIGH (ref 70–99)
GLUCOSE BLDC GLUCOMTR-MCNC: 136 MG/DL — HIGH (ref 70–99)
GLUCOSE SERPL-MCNC: 146 MG/DL — HIGH (ref 70–99)
HCT VFR BLD CALC: 34.9 % — SIGNIFICANT CHANGE UP (ref 34.5–45)
HGB BLD-MCNC: 11 G/DL — LOW (ref 11.5–15.5)
LEGIONELLA DNA SPEC NAA+PROBE: POSITIVE
LYMPHOCYTES # BLD AUTO: 9 % — LOW (ref 13–44)
MAGNESIUM SERPL-MCNC: 2.2 MG/DL — SIGNIFICANT CHANGE UP (ref 1.6–2.6)
MCHC RBC-ENTMCNC: 25.8 PG — LOW (ref 27–34)
MCHC RBC-ENTMCNC: 31.5 G/DL — LOW (ref 32–36)
MCV RBC AUTO: 81.7 FL — SIGNIFICANT CHANGE UP (ref 80–100)
MONOCYTES NFR BLD AUTO: 4 % — SIGNIFICANT CHANGE UP (ref 2–14)
NEUTROPHILS NFR BLD AUTO: 86.6 % — HIGH (ref 43–77)
PHOSPHATE SERPL-MCNC: 2.6 MG/DL — SIGNIFICANT CHANGE UP (ref 2.5–4.5)
PLATELET # BLD AUTO: 365 K/UL — SIGNIFICANT CHANGE UP (ref 150–400)
POTASSIUM SERPL-MCNC: 4 MMOL/L — SIGNIFICANT CHANGE UP (ref 3.5–5.3)
POTASSIUM SERPL-SCNC: 4 MMOL/L — SIGNIFICANT CHANGE UP (ref 3.5–5.3)
PROT SERPL-MCNC: 6.7 G/DL — SIGNIFICANT CHANGE UP (ref 6–8.3)
RBC # BLD: 4.27 M/UL — SIGNIFICANT CHANGE UP (ref 3.8–5.2)
RBC # FLD: 16.4 % — SIGNIFICANT CHANGE UP (ref 10.3–16.9)
SODIUM SERPL-SCNC: 139 MMOL/L — SIGNIFICANT CHANGE UP (ref 135–145)
SPECIMEN SOURCE: SIGNIFICANT CHANGE UP
WBC # BLD: 19.4 K/UL — HIGH (ref 3.8–10.5)
WBC # FLD AUTO: 19.4 K/UL — HIGH (ref 3.8–10.5)

## 2018-08-17 PROCEDURE — 99291 CRITICAL CARE FIRST HOUR: CPT | Mod: 25

## 2018-08-17 PROCEDURE — 84295 ASSAY OF SERUM SODIUM: CPT

## 2018-08-17 PROCEDURE — 87040 BLOOD CULTURE FOR BACTERIA: CPT

## 2018-08-17 PROCEDURE — 87070 CULTURE OTHR SPECIMN AEROBIC: CPT

## 2018-08-17 PROCEDURE — 85730 THROMBOPLASTIN TIME PARTIAL: CPT

## 2018-08-17 PROCEDURE — 87633 RESP VIRUS 12-25 TARGETS: CPT

## 2018-08-17 PROCEDURE — G0378: CPT

## 2018-08-17 PROCEDURE — 36415 COLL VENOUS BLD VENIPUNCTURE: CPT

## 2018-08-17 PROCEDURE — 82962 GLUCOSE BLOOD TEST: CPT

## 2018-08-17 PROCEDURE — 71045 X-RAY EXAM CHEST 1 VIEW: CPT | Mod: 26

## 2018-08-17 PROCEDURE — 87798 DETECT AGENT NOS DNA AMP: CPT

## 2018-08-17 PROCEDURE — 84450 TRANSFERASE (AST) (SGOT): CPT

## 2018-08-17 PROCEDURE — 85025 COMPLETE CBC W/AUTO DIFF WBC: CPT

## 2018-08-17 PROCEDURE — 96374 THER/PROPH/DIAG INJ IV PUSH: CPT

## 2018-08-17 PROCEDURE — 80053 COMPREHEN METABOLIC PANEL: CPT

## 2018-08-17 PROCEDURE — 84484 ASSAY OF TROPONIN QUANT: CPT

## 2018-08-17 PROCEDURE — 85027 COMPLETE CBC AUTOMATED: CPT

## 2018-08-17 PROCEDURE — 94640 AIRWAY INHALATION TREATMENT: CPT

## 2018-08-17 PROCEDURE — 82803 BLOOD GASES ANY COMBINATION: CPT

## 2018-08-17 PROCEDURE — 87389 HIV-1 AG W/HIV-1&-2 AB AG IA: CPT

## 2018-08-17 PROCEDURE — 71045 X-RAY EXAM CHEST 1 VIEW: CPT

## 2018-08-17 PROCEDURE — 85379 FIBRIN DEGRADATION QUANT: CPT

## 2018-08-17 PROCEDURE — 83605 ASSAY OF LACTIC ACID: CPT

## 2018-08-17 PROCEDURE — 83036 HEMOGLOBIN GLYCOSYLATED A1C: CPT

## 2018-08-17 PROCEDURE — 82550 ASSAY OF CK (CPK): CPT

## 2018-08-17 PROCEDURE — 83735 ASSAY OF MAGNESIUM: CPT

## 2018-08-17 PROCEDURE — 99233 SBSQ HOSP IP/OBS HIGH 50: CPT

## 2018-08-17 PROCEDURE — 82553 CREATINE MB FRACTION: CPT

## 2018-08-17 PROCEDURE — 87449 NOS EACH ORGANISM AG IA: CPT

## 2018-08-17 PROCEDURE — 87486 CHLMYD PNEUM DNA AMP PROBE: CPT

## 2018-08-17 PROCEDURE — 87451 POLYVALENT MULT ORG EA AG IA: CPT

## 2018-08-17 PROCEDURE — 84100 ASSAY OF PHOSPHORUS: CPT

## 2018-08-17 PROCEDURE — 93005 ELECTROCARDIOGRAM TRACING: CPT

## 2018-08-17 PROCEDURE — 97161 PT EVAL LOW COMPLEX 20 MIN: CPT

## 2018-08-17 PROCEDURE — 94002 VENT MGMT INPAT INIT DAY: CPT

## 2018-08-17 PROCEDURE — 80076 HEPATIC FUNCTION PANEL: CPT

## 2018-08-17 PROCEDURE — 84460 ALANINE AMINO (ALT) (SGPT): CPT

## 2018-08-17 PROCEDURE — 82330 ASSAY OF CALCIUM: CPT

## 2018-08-17 PROCEDURE — 80307 DRUG TEST PRSMV CHEM ANLYZR: CPT

## 2018-08-17 PROCEDURE — 80048 BASIC METABOLIC PNL TOTAL CA: CPT

## 2018-08-17 PROCEDURE — 94660 CPAP INITIATION&MGMT: CPT

## 2018-08-17 PROCEDURE — 85610 PROTHROMBIN TIME: CPT

## 2018-08-17 PROCEDURE — 84132 ASSAY OF SERUM POTASSIUM: CPT

## 2018-08-17 PROCEDURE — 94003 VENT MGMT INPAT SUBQ DAY: CPT

## 2018-08-17 PROCEDURE — 96375 TX/PRO/DX INJ NEW DRUG ADDON: CPT

## 2018-08-17 PROCEDURE — 71275 CT ANGIOGRAPHY CHEST: CPT

## 2018-08-17 PROCEDURE — 81001 URINALYSIS AUTO W/SCOPE: CPT

## 2018-08-17 PROCEDURE — 87581 M.PNEUMON DNA AMP PROBE: CPT

## 2018-08-17 PROCEDURE — 80074 ACUTE HEPATITIS PANEL: CPT

## 2018-08-17 RX ORDER — IPRATROPIUM/ALBUTEROL SULFATE 18-103MCG
3 AEROSOL WITH ADAPTER (GRAM) INHALATION
Qty: 70 | Refills: 0 | OUTPATIENT
Start: 2018-08-17

## 2018-08-17 RX ORDER — TIOTROPIUM BROMIDE 18 UG/1
1 CAPSULE ORAL; RESPIRATORY (INHALATION)
Qty: 30 | Refills: 0 | OUTPATIENT
Start: 2018-08-17

## 2018-08-17 RX ORDER — ALBUTEROL 90 UG/1
2 AEROSOL, METERED ORAL
Qty: 2 | Refills: 0 | OUTPATIENT
Start: 2018-08-17 | End: 2018-09-15

## 2018-08-17 RX ORDER — BUDESONIDE AND FORMOTEROL FUMARATE DIHYDRATE 160; 4.5 UG/1; UG/1
2 AEROSOL RESPIRATORY (INHALATION)
Qty: 2 | Refills: 0 | OUTPATIENT
Start: 2018-08-17 | End: 2018-09-15

## 2018-08-17 RX ORDER — CIPROFLOXACIN LACTATE 400MG/40ML
1 VIAL (ML) INTRAVENOUS
Qty: 11 | Refills: 0 | OUTPATIENT
Start: 2018-08-17 | End: 2018-08-27

## 2018-08-17 RX ADMIN — Medication 1 PATCH: at 10:36

## 2018-08-17 RX ADMIN — CHLORHEXIDINE GLUCONATE 1 APPLICATION(S): 213 SOLUTION TOPICAL at 07:46

## 2018-08-17 RX ADMIN — Medication 3 MILLILITER(S): at 07:23

## 2018-08-17 RX ADMIN — CHLORHEXIDINE GLUCONATE 15 MILLILITER(S): 213 SOLUTION TOPICAL at 07:23

## 2018-08-17 RX ADMIN — Medication 40 MILLIGRAM(S): at 07:24

## 2018-08-17 RX ADMIN — HEPARIN SODIUM 5000 UNIT(S): 5000 INJECTION INTRAVENOUS; SUBCUTANEOUS at 07:24

## 2018-08-17 RX ADMIN — Medication 3 MILLILITER(S): at 03:40

## 2018-08-17 RX ADMIN — Medication 3 MILLILITER(S): at 14:01

## 2018-08-17 RX ADMIN — Medication 3 MILLILITER(S): at 09:24

## 2018-08-17 RX ADMIN — Medication 1 PATCH: at 10:42

## 2018-08-17 NOTE — DISCHARGE NOTE ADULT - SECONDARY DIAGNOSIS.
Smoking COPD exacerbation Severe persistent asthma with status asthmaticus Elevated liver enzymes CLARISSA (acute kidney injury)

## 2018-08-17 NOTE — DISCHARGE NOTE ADULT - CARE PLAN
Principal Discharge DX:	Pneumonia of left lower lobe due to infectious organism  Goal:	Resolution of the infection in your lung.  Assessment and plan of treatment:	You were admitted to the hospital with shortness of breath and were found to have pneumonia due to legionella. While in the hospital you had to be intubated to secure your breathing  Secondary Diagnosis:	COPD exacerbation  Secondary Diagnosis:	Severe persistent asthma with status asthmaticus  Secondary Diagnosis:	Elevated liver enzymes  Secondary Diagnosis:	CLARISSA (acute kidney injury)  Secondary Diagnosis:	Smoking Principal Discharge DX:	Pneumonia of left lower lobe due to infectious organism  Goal:	Resolution of the infection in your lung.  Assessment and plan of treatment:	You were admitted to the hospital with shortness of breath and were found to have pneumonia due to legionella. While in the hospital you had to be intubated to secure your airway and you were treated with IV antibiotics for the infection. Your respiratory status was monitored, chest x ray was monitored, and blood was monitored for markers of infection. We believe that you require further hospitalization to stabilize your infection and to ensure resolution, however you are choosing to leave the hospital against medical advice (AMA) and have verbalized the understanding of the risks of leaving the hospital, including but not limited to worsening of respiratory status, respiratory failure, worsening of infection, and death. You have also verbalized understanding the benefits of staying in the hospital including further monitoring and treatment of your condition for improvement. Knowing these things you have chosen to leave AMA. Please follow up with your PCP and pulmonologist.  Secondary Diagnosis:	COPD exacerbation  Assessment and plan of treatment:	You were admitted to the hospital with shortness of breath and were found to have exacerbation of your existing COPD. While in the hospital you had to be intubated to secure your airway and you were treated with inhaled medications and steroids. Your respiratory status was monitored, chest x ray was monitored, and blood was monitored. We believe that you require further hospitalization to stabilize your condition and to ensure resolution, however you are choosing to leave the hospital against medical advice (AMA) and have verbalized the understanding of the risks of leaving the hospital, including but not limited to worsening of respiratory status, respiratory failure, worsening of infection, and death. You have also verbalized understanding the benefits of staying in the hospital including further monitoring and treatment of your condition for improvement. Knowing these things you have chosen to leave AMA. Please follow up with your PCP and pulmonologist.  Secondary Diagnosis:	Severe persistent asthma with status asthmaticus  Assessment and plan of treatment:	You were admitted to the hospital with history of asthma. While in the hospital you had to be intubated to secure your airway and you were treated with inhaled medications and steroids. Your respiratory status was monitored, chest x ray was monitored, and blood was monitored. We believe that you require further hospitalization to stabilize your condition and to ensure resolution, however you are choosing to leave the hospital against medical advice (AMA) and have verbalized the understanding of the risks of leaving the hospital, including but not limited to worsening of respiratory status, respiratory failure, worsening of infection, and death. You have also verbalized understanding the benefits of staying in the hospital including further monitoring and treatment of your condition for improvement. Knowing these things you have chosen to leave AMA. Please follow up with your PCP and pulmonologist.  Secondary Diagnosis:	Elevated liver enzymes  Assessment and plan of treatment:	During your hospital stay you were found to have elevated liver enzymes, we monitored this with bloodwork and it is stable. We believe that you require further hospitalization to stabilize your condition and to ensure resolution, however you are choosing to leave the hospital against medical advice (AMA) and have verbalized the understanding of the risks of leaving the hospital, including but not limited to worsening of respiratory status, respiratory failure, worsening of infection, and death. You have also verbalized understanding the benefits of staying in the hospital including further monitoring and treatment of your condition for improvement. Knowing these things you have chosen to leave AMA. Please follow up with your PCP.  Secondary Diagnosis:	CLARISSA (acute kidney injury)  Assessment and plan of treatment:	During your hospital stay you were found to have a kidney injury, likely in the setting of decreased oral intake in the setting of infection, we monitored this with bloodwork and it is stable. We believe that you require further hospitalization to stabilize your condition and to ensure resolution, however you are choosing to leave the hospital against medical advice (AMA) and have verbalized the understanding of the risks of leaving the hospital, including but not limited to worsening of respiratory status, respiratory failure, worsening of infection, and death. You have also verbalized understanding the benefits of staying in the hospital including further monitoring and treatment of your condition for improvement. Knowing these things you have chosen to leave AMA. Please follow up with your PCP.  Secondary Diagnosis:	Smoking  Assessment and plan of treatment:	You were admitted to the hospital with a history of smoking. We advise cessation of smoking. Please follow up with your PCP.

## 2018-08-17 NOTE — CHART NOTE - NSCHARTNOTEFT_GEN_A_CORE
Pt requesting prescription for oxygen tank for temporary use. Explained risks of oxygen tank in an active smoker. Pt and family assured medical team that patient will not be entering an environment where smoking will be taking place. Pt also verbalized that she has no further intent to continue smoking. Pt verbalized understanding of risks of oxygen tank in home with smoking.

## 2018-08-17 NOTE — CHART NOTE - NSCHARTNOTEFT_GEN_A_CORE
Patient seen on am rounds with fellow and attending at bedside. She has been on minimal propofol overnight and is tolerating CPAP trial. Patient expressing wishes to leave AMA, writing on notepad, daughters also at bedside agreeing that they would like to go to a different hospital. Patient extubated to NC and now satting well on room air. She is able to ambulate without difficulty. Removed fajardo catheter. Removed central line. Will send scripts for a 2 week prednisone taper, Levaquin for a total of 14 day course, albuterol, spiriva, symbicort inhalers.    Patient leaving AMA. Patient seen on am rounds with fellow and attending at bedside. She has been on minimal propofol overnight and is tolerating CPAP trial. Patient expressing wishes to leave AMA, writing on notepad, daughters also at bedside agreeing that they would like to go to a different hospital. Patient extubated to NC and now satting well on room air. She is able to ambulate without difficulty. Removed fajardo catheter. Removed central line. Will send scripts for a 2 week prednisone taper, Levaquin for a total of 14 day course, albuterol, spiriva, symbicort inhalers.    Explained risks including respiratory failure, worsening pneumonia, and death.  Patient continues to insist on leaving AMA. Discharge paperwork given. Signed AMA form.    Can follow up with Dr. Ricks in 1 week.

## 2018-08-17 NOTE — DISCHARGE NOTE ADULT - PLAN OF CARE
Resolution of the infection in your lung. You were admitted to the hospital with shortness of breath and were found to have pneumonia due to legionella. While in the hospital you had to be intubated to secure your breathing You were admitted to the hospital with shortness of breath and were found to have pneumonia due to legionella. While in the hospital you had to be intubated to secure your airway and you were treated with IV antibiotics for the infection. Your respiratory status was monitored, chest x ray was monitored, and blood was monitored for markers of infection. We believe that you require further hospitalization to stabilize your infection and to ensure resolution, however you are choosing to leave the hospital against medical advice (AMA) and have verbalized the understanding of the risks of leaving the hospital, including but not limited to worsening of respiratory status, respiratory failure, worsening of infection, and death. You have also verbalized understanding the benefits of staying in the hospital including further monitoring and treatment of your condition for improvement. Knowing these things you have chosen to leave AMA. Please follow up with your PCP and pulmonologist. You were admitted to the hospital with shortness of breath and were found to have exacerbation of your existing COPD. While in the hospital you had to be intubated to secure your airway and you were treated with inhaled medications and steroids. Your respiratory status was monitored, chest x ray was monitored, and blood was monitored. We believe that you require further hospitalization to stabilize your condition and to ensure resolution, however you are choosing to leave the hospital against medical advice (AMA) and have verbalized the understanding of the risks of leaving the hospital, including but not limited to worsening of respiratory status, respiratory failure, worsening of infection, and death. You have also verbalized understanding the benefits of staying in the hospital including further monitoring and treatment of your condition for improvement. Knowing these things you have chosen to leave AMA. Please follow up with your PCP and pulmonologist. You were admitted to the hospital with history of asthma. While in the hospital you had to be intubated to secure your airway and you were treated with inhaled medications and steroids. Your respiratory status was monitored, chest x ray was monitored, and blood was monitored. We believe that you require further hospitalization to stabilize your condition and to ensure resolution, however you are choosing to leave the hospital against medical advice (AMA) and have verbalized the understanding of the risks of leaving the hospital, including but not limited to worsening of respiratory status, respiratory failure, worsening of infection, and death. You have also verbalized understanding the benefits of staying in the hospital including further monitoring and treatment of your condition for improvement. Knowing these things you have chosen to leave AMA. Please follow up with your PCP and pulmonologist. During your hospital stay you were found to have elevated liver enzymes, we monitored this with bloodwork and it is stable. We believe that you require further hospitalization to stabilize your condition and to ensure resolution, however you are choosing to leave the hospital against medical advice (AMA) and have verbalized the understanding of the risks of leaving the hospital, including but not limited to worsening of respiratory status, respiratory failure, worsening of infection, and death. You have also verbalized understanding the benefits of staying in the hospital including further monitoring and treatment of your condition for improvement. Knowing these things you have chosen to leave AMA. Please follow up with your PCP. During your hospital stay you were found to have a kidney injury, likely in the setting of decreased oral intake in the setting of infection, we monitored this with bloodwork and it is stable. We believe that you require further hospitalization to stabilize your condition and to ensure resolution, however you are choosing to leave the hospital against medical advice (AMA) and have verbalized the understanding of the risks of leaving the hospital, including but not limited to worsening of respiratory status, respiratory failure, worsening of infection, and death. You have also verbalized understanding the benefits of staying in the hospital including further monitoring and treatment of your condition for improvement. Knowing these things you have chosen to leave AMA. Please follow up with your PCP. You were admitted to the hospital with a history of smoking. We advise cessation of smoking. Please follow up with your PCP.

## 2018-08-17 NOTE — DISCHARGE NOTE ADULT - HOSPITAL COURSE
57yo  female PMHx asthma, COPD, current smoker (unspecified amount of years), no hospitalizations or intubations in the past presented to [facility] ER with SOB for the past 3 days.  She did not use her asthma pump in the past few days. She admited to having fevers at home and a nonpurulent cough.Upon arrival to [facility]  CXR showed LLL infiltrate. CT chest: LLL infiltrate with left mediastinal lymphadenopathy,  severe emphysema. In the ED T: 99HR: 109 RR: 22 BP: 123/70 SpO2: 91 on RA, subsequently placed on high flow.  Labs showing Wbc: 11.4 ,, ALT: 270. EKG showed prolong QT and sinus tachycardia, negative troponin She was given 1250mg vancomycin and 3.375 piperzillin/tazobactam. Pt was septic in setting of LLL pna  Pt  transferred to  [address] [facility] for further management for CAP 2/2 in the setting of acute COPD exacerbation. Pt found to have positive legionella antigen and was started on iv Levaquin q daily .  At around 12am 8/15 patient spiked temperature to 103 with altered mental status, mildly combative removing telemetry leads and pulse oxymetry, demanding to be discharged. Desaturating ot 80s on 50L HFNC , FiO2 65%.Patient transferred to ICU for increased oxygen requirements and possible intubation in the setting of COPD with legionella PNA. While in ICU pt was put on BiPAP, pt required intubation due to increasing O2 requirements and increased WOB. Pt's respiratory status was monitored, CXR was monitored, CBC and BMP were monitored and VS were monitored. Pt was continued on IV levaquin. Sputum cx and legionella PCR was sent and legionella PCR was found to be positive. Pt expressed that she wanted to be extubated and leave AMA. Pt was extubated. Pt was encouraged on multiple attempts to stay in the hospital and educated on the benefits of staying in the hospital (such as further monitoring and improvement in respiratory status and overall condition). Pt also educated on risks of leaving the hospital including but not limited to worsening of respiratory status, worsening of infection, respiratory failure and death. Patient verbalized understanding of risks and benefits using teach back method and chose to leave against medical advice (AMA).

## 2018-08-17 NOTE — DISCHARGE NOTE ADULT - ADDITIONAL INSTRUCTIONS
Please take the prescribed medications as instructed and make an appointment to follow up with Dr. Rosio Ricks (Pulmonology) and you primary care doctor.

## 2018-08-17 NOTE — DISCHARGE NOTE ADULT - MEDICATION SUMMARY - MEDICATIONS TO TAKE
I will START or STAY ON the medications listed below when I get home from the hospital:    predniSONE 10 mg oral tablet  -- 4 tab(s) oral - by mouth once a day x 5 days  3 tab(s) oral - by mouth once a day x 3 days  2 tab(s) oral - by mouth once a day x 3 days  1 tab(s) oral - by mouth once a day x 3 days  -- It is very important that you take or use this exactly as directed.  Do not skip doses or discontinue unless directed by your doctor.  Obtain medical advice before taking any non-prescription drugs as some may affect the action of this medication.  Take with food or milk.    -- Indication: For COPD exacerbation    albuterol 0.63 mg/3 mL (0.021%) inhalation solution  -- 2 puff(s) by nebulizer every 6 hours, As Needed  -for bronchospasm   -- For inhalation only.  It is very important that you take or use this exactly as directed.  Do not skip doses or discontinue unless directed by your doctor.  Obtain medical advice before taking any non-prescription drugs as some may affect the action of this medication.    -- Indication: For COPD    Symbicort 80 mcg-4.5 mcg/inh inhalation aerosol  -- 2 puff(s) inhaled 2 times a day   -- Check with your doctor before becoming pregnant.  For inhalation only.  Rinse mouth thoroughly after use.    -- Indication: For COPD    Spiriva 18 mcg inhalation capsule  -- 1 cap(s) inhaled once a day   -- Check with your doctor before becoming pregnant.  For inhalation only.  It is very important that you take or use this exactly as directed.  Do not skip doses or discontinue unless directed by your doctor.  Obtain medical advice before taking any non-prescription drugs as some may affect the action of this medication.    -- Indication: For COPD    ipratropium-albuterol 0.5 mg-2.5 mg/3 mLinhalation solution  -- 3 milliliter(s) inhaled every 4 hours  -- Indication: For COPD exacerbation    levoFLOXacin 750 mg oral tablet  -- 1 tab(s) by mouth every 24 hours  -- Indication: For Legionella pneumonia

## 2018-08-17 NOTE — DISCHARGE NOTE ADULT - CARE PROVIDER_API CALL
Rosio Ricks), Critical Care Medicine; Pulmonary Disease  [address] [id number]  Phone: [phone]  Fax: [phone]

## 2018-08-17 NOTE — DISCHARGE NOTE ADULT - PATIENT PORTAL LINK FT
You can access the [facility] Patient Portal, offered by [facility], by registering with the following website: [email or web address]

## 2018-08-19 LAB
CULTURE RESULTS: SIGNIFICANT CHANGE UP
SPECIMEN SOURCE: SIGNIFICANT CHANGE UP

## 2018-08-20 LAB
CULTURE RESULTS: SIGNIFICANT CHANGE UP
SPECIMEN SOURCE: SIGNIFICANT CHANGE UP

## 2018-08-28 DIAGNOSIS — E87.3 ALKALOSIS: ICD-10-CM

## 2018-08-28 DIAGNOSIS — Z88.0 ALLERGY STATUS TO PENICILLIN: ICD-10-CM

## 2018-08-28 DIAGNOSIS — Z91.018 ALLERGY TO OTHER FOODS: ICD-10-CM

## 2018-08-28 DIAGNOSIS — R59.0 LOCALIZED ENLARGED LYMPH NODES: ICD-10-CM

## 2018-08-28 DIAGNOSIS — Z78.1 PHYSICAL RESTRAINT STATUS: ICD-10-CM

## 2018-08-28 DIAGNOSIS — A41.9 SEPSIS, UNSPECIFIED ORGANISM: ICD-10-CM

## 2018-08-28 DIAGNOSIS — L25.1 UNSPECIFIED CONTACT DERMATITIS DUE TO DRUGS IN CONTACT WITH SKIN: ICD-10-CM

## 2018-08-28 DIAGNOSIS — I45.81 LONG QT SYNDROME: ICD-10-CM

## 2018-08-28 DIAGNOSIS — J45.52 SEVERE PERSISTENT ASTHMA WITH STATUS ASTHMATICUS: ICD-10-CM

## 2018-08-28 DIAGNOSIS — J96.01 ACUTE RESPIRATORY FAILURE WITH HYPOXIA: ICD-10-CM

## 2018-08-28 DIAGNOSIS — A48.1 LEGIONNAIRES' DISEASE: ICD-10-CM

## 2018-08-28 DIAGNOSIS — J44.0 CHRONIC OBSTRUCTIVE PULMONARY DISEASE WITH (ACUTE) LOWER RESPIRATORY INFECTION: ICD-10-CM

## 2018-08-28 DIAGNOSIS — N17.9 ACUTE KIDNEY FAILURE, UNSPECIFIED: ICD-10-CM

## 2018-08-28 DIAGNOSIS — J44.1 CHRONIC OBSTRUCTIVE PULMONARY DISEASE WITH (ACUTE) EXACERBATION: ICD-10-CM

## 2018-08-28 DIAGNOSIS — Z91.19 PATIENT'S NONCOMPLIANCE WITH OTHER MEDICAL TREATMENT AND REGIMEN: ICD-10-CM

## 2018-08-28 DIAGNOSIS — R65.20 SEVERE SEPSIS WITHOUT SEPTIC SHOCK: ICD-10-CM

## 2018-08-28 DIAGNOSIS — E86.0 DEHYDRATION: ICD-10-CM

## 2018-08-28 DIAGNOSIS — M62.82 RHABDOMYOLYSIS: ICD-10-CM

## 2018-08-28 DIAGNOSIS — F17.210 NICOTINE DEPENDENCE, CIGARETTES, UNCOMPLICATED: ICD-10-CM

## 2018-08-28 DIAGNOSIS — R74.0 NONSPECIFIC ELEVATION OF LEVELS OF TRANSAMINASE AND LACTIC ACID DEHYDROGENASE [LDH]: ICD-10-CM

## 2018-08-28 DIAGNOSIS — T36.8X5A ADVERSE EFFECT OF OTHER SYSTEMIC ANTIBIOTICS, INITIAL ENCOUNTER: ICD-10-CM

## 2018-08-28 DIAGNOSIS — Z88.1 ALLERGY STATUS TO OTHER ANTIBIOTIC AGENTS STATUS: ICD-10-CM

## 2019-09-13 NOTE — PHYSICAL THERAPY INITIAL EVALUATION ADULT - SKIN MOISTURE
dry Referred To Otolaryngology For Closure Text (Leave Blank If You Do Not Want): After obtaining clear surgical margins the patient was sent to otolaryngology for surgical repair.  The patient understands they will receive post-surgical care and follow-up from the referring physician's office.

## 2019-11-15 ENCOUNTER — EMERGENCY (EMERGENCY)
Facility: HOSPITAL | Age: 59
LOS: 1 days | Discharge: HOME | End: 2019-11-15
Attending: EMERGENCY MEDICINE | Admitting: EMERGENCY MEDICINE
Payer: MEDICARE

## 2019-11-15 VITALS
SYSTOLIC BLOOD PRESSURE: 126 MMHG | DIASTOLIC BLOOD PRESSURE: 88 MMHG | HEIGHT: 65 IN | WEIGHT: 192.02 LBS | OXYGEN SATURATION: 96 % | TEMPERATURE: 98 F | HEART RATE: 84 BPM | RESPIRATION RATE: 20 BRPM
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
## 2019-11-15 DIAGNOSIS — R49.0 DYSPHONIA: ICD-10-CM

## 2019-11-15 DIAGNOSIS — J44.9 CHRONIC OBSTRUCTIVE PULMONARY DISEASE, UNSPECIFIED: ICD-10-CM

## 2019-11-15 DIAGNOSIS — Z79.899 OTHER LONG TERM (CURRENT) DRUG THERAPY: ICD-10-CM

## 2019-11-15 DIAGNOSIS — H92.03 OTALGIA, BILATERAL: ICD-10-CM

## 2019-11-15 DIAGNOSIS — J02.9 ACUTE PHARYNGITIS, UNSPECIFIED: ICD-10-CM

## 2019-11-15 PROCEDURE — 99283 EMERGENCY DEPT VISIT LOW MDM: CPT

## 2019-11-15 RX ORDER — AZITHROMYCIN 500 MG/1
1 TABLET, FILM COATED ORAL
Qty: 4 | Refills: 0
Start: 2019-11-15 | End: 2019-11-18

## 2019-11-15 RX ORDER — AZITHROMYCIN 500 MG/1
500 TABLET, FILM COATED ORAL ONCE
Refills: 0 | Status: COMPLETED | OUTPATIENT
Start: 2019-11-15 | End: 2019-11-15

## 2019-11-15 RX ORDER — KETOROLAC TROMETHAMINE 30 MG/ML
15 SYRINGE (ML) INJECTION ONCE
Refills: 0 | Status: DISCONTINUED | OUTPATIENT
Start: 2019-11-15 | End: 2019-11-15

## 2019-11-15 RX ADMIN — AZITHROMYCIN 500 MILLIGRAM(S): 500 TABLET, FILM COATED ORAL at 20:38

## 2019-11-15 RX ADMIN — Medication 15 MILLIGRAM(S): at 20:38

## 2019-11-15 NOTE — ED PROVIDER NOTE - PHYSICAL EXAMINATION
VITAL SIGNS: I have reviewed nursing notes and confirm.  CONSTITUTIONAL: Well-developed; well-nourished; in no acute distress.  SKIN: Skin is warm and dry, no acute rash.  HEAD: Normocephalic; atraumatic.  EYES: PERRL, EOM intact; conjunctiva and sclera clear.  ENT: No nasal discharge; airway clear. +Erythema and edema to bilateral tonsils and posterior pharynx.   NECK: Supple; non tender.  CARD: S1, S2 normal; no murmurs, gallops, or rubs. Regular rate and rhythm.  RESP: No wheezes, rales or rhonchi.  ABD: Normal bowel sounds; soft; non-distended; non-tender; no hepatosplenomegaly.  EXT: Normal ROM. No clubbing, cyanosis or edema.  NEURO: Alert, oriented. Grossly unremarkable.  PSYCH: Cooperative, appropriate. VITAL SIGNS: I have reviewed nursing notes and confirm.  CONSTITUTIONAL: Well-developed; well-nourished; in no acute distress.  SKIN: Skin is warm and dry, no acute rash.  HEAD: Normocephalic; atraumatic.  EYES: PERRL, EOM intact; conjunctiva and sclera clear.  ENT: No nasal discharge; airway clear. +Erythema and edema to bilateral tonsils and posterior pharynx.  TMS clear b/l with normal color and normal cone of light.   NECK: Supple; non tender.   CARD: S1, S2 normal; no murmurs, gallops, or rubs. Regular rate and rhythm.  RESP: No wheezes, rales or rhonchi.  ABD: Normal bowel sounds; soft; non-distended; non-tender; no hepatosplenomegaly.  EXT: Normal ROM. No clubbing, cyanosis or edema.  NEURO: Alert, oriented. Grossly unremarkable.  PSYCH: Cooperative, appropriate.

## 2019-11-15 NOTE — ED PROVIDER NOTE - OBJECTIVE STATEMENT
60 y/o female with PMHx of COPD presents to the ED with complaints of sore throat with associated hoarseness and bilateral ear pain x 2 days. Pt also reports coughing when eating. Denies any associated between symptoms and COPD. Denies previous episode of incident. Denies fever, chills, chest pain, SOB.

## 2019-11-15 NOTE — ED PROVIDER NOTE - NSFOLLOWUPINSTRUCTIONS_ED_ALL_ED_FT
-PLEASE FOLLOW-UP WITH YOUR PRIMARY CARE DOCTOR IN 1-2 DAYS.  BRING ALL PAPERWORK FROM TODAY'S VISIT TO YOUR FOLLOW-UP VISIT.   -PLEASE GO TO YOUR PHARMACY TOMORROW TO FILL YOUR PRESCRIPTIONS.  PLEASE USE AS DIRECTED.  -PLEASE RETURN TO THE ER IMMEDIATELY OR CALL 911 FOR ANY HIGH FEVER, TROUBLE BREATHING, VOMITING, SEVERE PAIN, OR ANY OTHER CONCERNS.

## 2019-11-15 NOTE — ED PROVIDER NOTE - PATIENT PORTAL LINK FT
You can access the FollowMyHealth Patient Portal offered by [facility] by registering at the following website: [email or web address]. By joining [facility]’s FollowMyHealth portal, you will also be able to view your health information using other applications (apps) compatible with our system.

## 2019-11-15 NOTE — ED ADULT NURSE NOTE - OBJECTIVE STATEMENT
58 y/o female with PMHx of COPD presents to the ED with complaints of sore throat with associated hoarseness and bilateral ear pain x 2 days. Pt also reports coughing when eating. Denies any associated between symptoms and COPD. Denies previous episode of incident. Denies fever, chills, chest pain, SOB.

## 2019-11-15 NOTE — ED PROVIDER NOTE - CLINICAL SUMMARY MEDICAL DECISION MAKING FREE TEXT BOX
Given exam will give pain control and abx coverage.  Strict care instructions and return precautions.

## 2019-11-16 PROBLEM — F17.200 NICOTINE DEPENDENCE, UNSPECIFIED, UNCOMPLICATED: Chronic | Status: ACTIVE | Noted: 2018-08-15

## 2019-11-16 PROBLEM — J45.909 UNSPECIFIED ASTHMA, UNCOMPLICATED: Chronic | Status: ACTIVE | Noted: 2018-08-12

## 2019-11-27 ENCOUNTER — EMERGENCY (EMERGENCY)
Facility: HOSPITAL | Age: 59
LOS: 1 days | Discharge: HOME | End: 2019-11-27
Attending: EMERGENCY MEDICINE | Admitting: EMERGENCY MEDICINE
Payer: MEDICARE

## 2019-11-27 VITALS
HEIGHT: 65 IN | WEIGHT: 190.04 LBS | TEMPERATURE: 98 F | OXYGEN SATURATION: 95 % | HEART RATE: 79 BPM | RESPIRATION RATE: 16 BRPM | SYSTOLIC BLOOD PRESSURE: 124 MMHG | DIASTOLIC BLOOD PRESSURE: 81 MMHG
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
## 2019-11-27 DIAGNOSIS — M54.5 LOW BACK PAIN: ICD-10-CM

## 2019-11-27 DIAGNOSIS — M25.561 PAIN IN RIGHT KNEE: ICD-10-CM

## 2019-11-27 PROCEDURE — 72100 X-RAY EXAM L-S SPINE 2/3 VWS: CPT | Mod: 26

## 2019-11-27 PROCEDURE — 99283 EMERGENCY DEPT VISIT LOW MDM: CPT

## 2019-11-27 RX ORDER — LIDOCAINE 4 G/100G
1 CREAM TOPICAL ONCE
Refills: 0 | Status: COMPLETED | OUTPATIENT
Start: 2019-11-27 | End: 2019-11-27

## 2019-11-27 RX ORDER — CYCLOBENZAPRINE HYDROCHLORIDE 10 MG/1
10 TABLET, FILM COATED ORAL ONCE
Refills: 0 | Status: COMPLETED | OUTPATIENT
Start: 2019-11-27 | End: 2019-11-27

## 2019-11-27 RX ORDER — CYCLOBENZAPRINE HYDROCHLORIDE 10 MG/1
1 TABLET, FILM COATED ORAL
Qty: 15 | Refills: 0
Start: 2019-11-27 | End: 2019-12-01

## 2019-11-27 RX ORDER — IBUPROFEN 200 MG
1 TABLET ORAL
Qty: 15 | Refills: 0
Start: 2019-11-27 | End: 2019-12-01

## 2019-11-27 RX ORDER — IBUPROFEN 200 MG
800 TABLET ORAL ONCE
Refills: 0 | Status: COMPLETED | OUTPATIENT
Start: 2019-11-27 | End: 2019-11-27

## 2019-11-27 RX ADMIN — Medication 800 MILLIGRAM(S): at 08:53

## 2019-11-27 RX ADMIN — CYCLOBENZAPRINE HYDROCHLORIDE 10 MILLIGRAM(S): 10 TABLET, FILM COATED ORAL at 08:53

## 2019-11-27 RX ADMIN — LIDOCAINE 1 PATCH: 4 CREAM TOPICAL at 08:53

## 2019-11-27 NOTE — ED ADULT TRIAGE NOTE - RESPIRATORY RATE (BREATHS/MIN)
EMERGENCY DEPARTMENT HISTORY AND PHYSICAL EXAM 
 
Date: 4/1/2019 Patient Name: Olena Cruz History of Presenting Illness Chief Complaint Patient presents with  Mental Health Problem History Provided By: Patient Additional History (Context): Olena Cruz is a 29 y.o. male [employer] with no significant past medical history presents to the emergency department with his wife c/O suicidal ideations. Patient reports that he has had suicidal ideations since his last shift appointment. He reports that he is scheduled to be \"shipped out\" today however states that he feels anxious and has thoughts of jumping off the ship if he were to be sent out. He denies any prior history of diagnosed depression, bipolar disorder or antidepressant meds. He denies any prior suicidal attempts. He denies any homicidal ideations. pt denies recent illness including fever, chills, nausea, vomiting, diarrhea, chest pain or shortness of breath, and any other sxs or complaints. PCP: No primary care provider on file. Past History Past Medical History: No past medical history on file. Past Surgical History: No past surgical history on file. Family History: No family history on file. Social History: 
Social History Tobacco Use  Smoking status: Not on file Substance Use Topics  Alcohol use: Not on file  Drug use: Not on file Allergies: 
No Known Allergies Review of Systems Review of Systems Constitutional: Negative for chills and fever. HENT: Negative for congestion, ear pain, sinus pain and sore throat. Eyes: Negative for pain and visual disturbance. Respiratory: Negative for cough and shortness of breath. Cardiovascular: Negative for chest pain and leg swelling. Gastrointestinal: Negative for abdominal pain, constipation, diarrhea, nausea and vomiting. Genitourinary: Negative for dysuria and hematuria. Musculoskeletal: Negative for back pain and neck pain. Psychiatric/Behavioral: Positive for suicidal ideas. Negative for behavioral problems. The patient is nervous/anxious. All other systems reviewed and are negative. Physical Exam  
 
Vitals:  
 04/01/19 0134 BP: (!) 152/98 Pulse: 68 Resp: 18 Temp: 97.9 °F (36.6 °C) SpO2: 100% Weight: 106.6 kg (235 lb) Height: 5' 11\" (1.803 m) Physical Exam 
 
Nursing note and vitals reviewed Constitutional: Young  male, anxious but not appearing acutely ill Head: Normocephalic, Atraumatic Eyes: Pupils are equal, round, and reactive to light, EOMI Neck: Supple, non-tender Cardiovascular: Regular rate and rhythm, no murmurs, rubs, or gallops Chest: Normal work of breathing and chest excursion bilaterally Lungs: Clear to ausculation bilaterally, no wheezes, no rhonchi Abdomen: Soft, non tender, non distended, normoactive bowel sounds Back: No evidence of trauma or deformity Extremities: No evidence of trauma or deformity, no LE edema Skin: Warm and dry, normal cap refill Neuro: Alert and appropriate, CN intact, normal speech, moving all 4 extremities freely and symmetrically Psychiatric: Anxious but cooperative Diagnostic Study Results Labs - Recent Results (from the past 12 hour(s)) CBC WITH AUTOMATED DIFF Collection Time: 04/01/19  7:00 AM  
Result Value Ref Range WBC 7.6 4.6 - 13.2 K/uL  
 RBC 5.15 4.70 - 5.50 M/uL  
 HGB 15.1 13.0 - 16.0 g/dL HCT 45.3 36.0 - 48.0 % MCV 88.0 74.0 - 97.0 FL  
 MCH 29.3 24.0 - 34.0 PG  
 MCHC 33.3 31.0 - 37.0 g/dL  
 RDW 12.9 11.6 - 14.5 % PLATELET 427 689 - 376 K/uL MPV 9.8 9.2 - 11.8 FL  
 NEUTROPHILS 56 40 - 73 % LYMPHOCYTES 32 21 - 52 % MONOCYTES 9 3 - 10 % EOSINOPHILS 3 0 - 5 % BASOPHILS 0 0 - 2 %  
 ABS. NEUTROPHILS 4.2 1.8 - 8.0 K/UL  
 ABS. LYMPHOCYTES 2.4 0.9 - 3.6 K/UL  
 ABS. MONOCYTES 0.7 0.05 - 1.2 K/UL ABS. EOSINOPHILS 0.2 0.0 - 0.4 K/UL  
 ABS. BASOPHILS 0.0 0.0 - 0.1 K/UL  
 DF AUTOMATED URINALYSIS W/ RFLX MICROSCOPIC Collection Time: 04/01/19  7:00 AM  
Result Value Ref Range Color YELLOW Appearance CLEAR Specific gravity 1.025 1.005 - 1.030    
 pH (UA) 5.0 5.0 - 8.0 Protein NEGATIVE  NEG mg/dL Glucose NEGATIVE  NEG mg/dL Ketone NEGATIVE  NEG mg/dL Bilirubin NEGATIVE  NEG Blood NEGATIVE  NEG Urobilinogen 0.2 0.2 - 1.0 EU/dL Nitrites NEGATIVE  NEG Leukocyte Esterase NEGATIVE  NEG Radiologic Studies - No orders to display CT Results  (Last 48 hours) None CXR Results  (Last 48 hours) None Medical Decision Making I am the first provider for this patient. I reviewed the vital signs, available nursing notes, past medical history, past surgical history, family history and social history. Vital Signs-Reviewed the patient's vital signs. Pulse Oximetry Analysis -100 % on room air Records Reviewed: Nursing Notes and Old Medical Records Provider Notes:  
29 y.o. male presenting with suicidal ideations. On exam, patient exhibited appropriate VS, non toxic and NAD. Will obtain screening labwork including UDS, ethyl alcohol and consult psych at [facility] for further recommendations. Procedures: 
Procedures ED Course:  
7:18 AM 
 Initial assessment performed. The patients presenting problems have been discussed, and they are in agreement with the care plan formulated and outlined with them. I have encouraged them to ask questions as they arise throughout their visit. 
 
8:01 AM UDS negative. CMP with no metabolic derangements. Patient medically cleared. 8:26 AM Discussed patient's history, exam, and available diagnostics results with Dr. Randa Shaffer, psychiatry, who requests labs to be sent over for review. Diagnosis and Disposition 9:36 AM 
 I have spent 40 minutes of critical care time involved in lab review, consultations with specialist, family decision-making, and documentation. During this entire length of time I was immediately available to the patient. Critical Care: The reason for providing this level of medical care for this critically ill patient was due a critical illness that impaired one or more vital organ systems such that there was a high probability of imminent or life threatening deterioration in the patients condition. This care involved high complexity decision making to assess, manipulate, and support vital system functions, to treat this degreee vital organ system failure and to prevent further life threatening deterioration of the patients condition. CONSULT NOTE:  
9:32 AM 
Ankit Cerda DO 
 spoke with Dr. Gina Brown Specialty: Psychiatry Discussed pt's hx, disposition, and available diagnostic and imaging results. Reviewed care plans. Consulting physician agrees with plans as outlined. Written by Ankit Cerda DO,  
TRANSFER PROGRESS NOTE: 
 
9:32 AM 
Discussed impending transfer with Patient and/or family. Pt and/or family instructed that Pt would be transferred to [facility].  Discussed reasoning for transfer and future treatment plan. Family and Pt understands and agrees with care plan. Written by Ankit Cerda DO, Labs Reviewed METABOLIC PANEL, COMPREHENSIVE - Abnormal; Notable for the following components:  
    Result Value Chloride 109 (*) Glucose 100 (*)   
 BUN/Creatinine ratio 10 (*) All other components within normal limits SALICYLATE - Abnormal; Notable for the following components:  
 Salicylate level <7.4 (*) All other components within normal limits ACETAMINOPHEN - Abnormal; Notable for the following components:  
 Acetaminophen level <2 (*) All other components within normal limits CBC WITH AUTOMATED DIFF  
 URINALYSIS W/ RFLX MICROSCOPIC  
DRUG SCREEN, URINE  
ETHYL ALCOHOL Recent Results (from the past 12 hour(s)) CBC WITH AUTOMATED DIFF Collection Time: 04/01/19  7:00 AM  
Result Value Ref Range WBC 7.6 4.6 - 13.2 K/uL  
 RBC 5.15 4.70 - 5.50 M/uL  
 HGB 15.1 13.0 - 16.0 g/dL HCT 45.3 36.0 - 48.0 % MCV 88.0 74.0 - 97.0 FL  
 MCH 29.3 24.0 - 34.0 PG  
 MCHC 33.3 31.0 - 37.0 g/dL  
 RDW 12.9 11.6 - 14.5 % PLATELET 411 064 - 214 K/uL MPV 9.8 9.2 - 11.8 FL  
 NEUTROPHILS 56 40 - 73 % LYMPHOCYTES 32 21 - 52 % MONOCYTES 9 3 - 10 % EOSINOPHILS 3 0 - 5 % BASOPHILS 0 0 - 2 %  
 ABS. NEUTROPHILS 4.2 1.8 - 8.0 K/UL  
 ABS. LYMPHOCYTES 2.4 0.9 - 3.6 K/UL  
 ABS. MONOCYTES 0.7 0.05 - 1.2 K/UL  
 ABS. EOSINOPHILS 0.2 0.0 - 0.4 K/UL  
 ABS. BASOPHILS 0.0 0.0 - 0.1 K/UL  
 DF AUTOMATED METABOLIC PANEL, COMPREHENSIVE Collection Time: 04/01/19  7:00 AM  
Result Value Ref Range Sodium 140 136 - 145 mmol/L Potassium 4.2 3.5 - 5.5 mmol/L Chloride 109 (H) 100 - 108 mmol/L  
 CO2 26 21 - 32 mmol/L Anion gap 5 3.0 - 18 mmol/L Glucose 100 (H) 74 - 99 mg/dL BUN 9 7.0 - 18 MG/DL Creatinine 0.94 0.6 - 1.3 MG/DL  
 BUN/Creatinine ratio 10 (L) 12 - 20 GFR est AA >60 >60 ml/min/1.73m2 GFR est non-AA >60 >60 ml/min/1.73m2 Calcium 8.6 8.5 - 10.1 MG/DL Bilirubin, total 0.6 0.2 - 1.0 MG/DL  
 ALT (SGPT) 42 16 - 61 U/L  
 AST (SGOT) 21 15 - 37 U/L Alk. phosphatase 81 45 - 117 U/L Protein, total 7.1 6.4 - 8.2 g/dL Albumin 4.1 3.4 - 5.0 g/dL Globulin 3.0 2.0 - 4.0 g/dL A-G Ratio 1.4 0.8 - 1.7 URINALYSIS W/ RFLX MICROSCOPIC Collection Time: 04/01/19  7:00 AM  
Result Value Ref Range Color YELLOW Appearance CLEAR Specific gravity 1.025 1.005 - 1.030    
 pH (UA) 5.0 5.0 - 8.0 Protein NEGATIVE  NEG mg/dL Glucose NEGATIVE  NEG mg/dL Ketone NEGATIVE  NEG mg/dL Bilirubin NEGATIVE  NEG  Blood NEGATIVE  NEG    
 Urobilinogen 0.2 0.2 - 1.0 EU/dL Nitrites NEGATIVE  NEG Leukocyte Esterase NEGATIVE  NEG    
DRUG SCREEN, URINE Collection Time: 04/01/19  7:00 AM  
Result Value Ref Range BENZODIAZEPINES NEGATIVE  NEG    
 BARBITURATES NEGATIVE  NEG    
 THC (TH-CANNABINOL) NEGATIVE  NEG    
 OPIATES NEGATIVE  NEG    
 PCP(PHENCYCLIDINE) NEGATIVE  NEG    
 COCAINE NEGATIVE  NEG    
 AMPHETAMINES NEGATIVE  NEG METHADONE NEGATIVE  NEG HDSCOM (NOTE) ETHYL ALCOHOL Collection Time: 04/01/19  7:00 AM  
Result Value Ref Range ALCOHOL(ETHYL),SERUM <3 0 - 3 MG/DL  
SALICYLATE Collection Time: 04/01/19  7:00 AM  
Result Value Ref Range Salicylate level <7.8 (L) 2.8 - 20.0 MG/DL  
ACETAMINOPHEN Collection Time: 04/01/19  7:00 AM  
Result Value Ref Range Acetaminophen level <2 (L) 10.0 - 30.0 ug/mL CLINICAL IMPRESSION 1. Suicidal ideation   
 
 
 
 
 
 
____________________________________ Please note that this dictation was completed with [employer], the [employer] voice recognition software. Quite often unanticipated grammatical, syntax, homophones, and other interpretive errors are inadvertently transcribed by the computer software. Please disregard these errors. Please excuse any errors that have escaped final proofreading. 16

## 2019-11-27 NOTE — ED PROVIDER NOTE - CLINICAL SUMMARY MEDICAL DECISION MAKING FREE TEXT BOX
58 y/o F presenting with L sided lumbar back pain with radiation to the R buttock. Pt currently sitting comfortably in her chair and in no apparent distress. Significant findings include left lumbar paraspinal tenderness to palpation. Will order pain medications and XR of the L spine.  Suspect for musculoskeletal etiology. Will most likely D/C home with pain medication pending XR results.
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
## 2019-11-27 NOTE — ED PROVIDER NOTE - PHYSICAL EXAMINATION
VITAL SIGNS: I have reviewed nursing notes and confirm.  CONSTITUTIONAL: Well-developed; well-nourished; in no acute distress.  SKIN: Skin exam is warm and dry, no acute rash.  HEAD: Normocephalic; atraumatic.  EYES: PERRL, EOM intact; conjunctiva and sclera clear.  ENT: No nasal discharge; airway clear.  NECK: Supple; non tender.  CARD: S1, S2 normal; no murmurs, gallops, or rubs. Regular rate and rhythm.  RESP: Unlabored. No wheezes, rales or rhonchi.  ABD: soft; non-distended; non-tender  EXT: Left lumbar paraspinal tenderness to palpation. No bony / midline tenderness.   LYMPH: No acute cervical adenopathy.  NEURO: Alert, oriented. Grossly unremarkable.  PSYCH: Cooperative, appropriate.

## 2019-11-27 NOTE — ED PROVIDER NOTE - CHPI ED SYMPTOMS NEG
no neck tenderness/no CP, Abd pain, N/V, numbness, tingling./no difficulty bearing weight/no loss of consciousness
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
## 2019-11-27 NOTE — ED PROVIDER NOTE - PATIENT PORTAL LINK FT
You can access the FollowMyHealth Patient Portal offered by [facility] by registering at the following website: [email or web address]. By joining [employer]’s FollowMyHealth portal, you will also be able to view your health information using other applications (apps) compatible with our system.

## 2019-11-29 PROBLEM — J44.9 CHRONIC OBSTRUCTIVE PULMONARY DISEASE, UNSPECIFIED: Chronic | Status: ACTIVE | Noted: 2019-11-15

## 2020-01-12 ENCOUNTER — EMERGENCY (EMERGENCY)
Facility: HOSPITAL | Age: 60
LOS: 1 days | Discharge: HOME | End: 2020-01-12
Attending: EMERGENCY MEDICINE | Admitting: EMERGENCY MEDICINE
Payer: MEDICARE

## 2020-01-12 VITALS
HEART RATE: 76 BPM | SYSTOLIC BLOOD PRESSURE: 136 MMHG | OXYGEN SATURATION: 98 % | TEMPERATURE: 97 F | HEIGHT: 65 IN | DIASTOLIC BLOOD PRESSURE: 89 MMHG | RESPIRATION RATE: 17 BRPM | WEIGHT: 154.98 LBS
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
## 2020-01-12 PROBLEM — Z78.9 OTHER SPECIFIED HEALTH STATUS: Chronic | Status: ACTIVE | Noted: 2019-11-27

## 2020-01-12 PROCEDURE — 99282 EMERGENCY DEPT VISIT SF MDM: CPT

## 2020-01-12 RX ORDER — DEXAMETHASONE 0.5 MG/5ML
10 ELIXIR ORAL ONCE
Refills: 0 | Status: COMPLETED | OUTPATIENT
Start: 2020-01-12 | End: 2020-01-12

## 2020-01-12 RX ADMIN — Medication 10 MILLIGRAM(S): at 02:18

## 2020-01-12 NOTE — ED ADULT TRIAGE NOTE - CHIEF COMPLAINT QUOTE
Pt reports throat pain, hoarse voice and intermittent tinnitus x2 days. Denies fever or chills, no CP or SOB, no cough.

## 2020-01-12 NOTE — ED PROVIDER NOTE - PATIENT PORTAL LINK FT
You can access the FollowMyHealth Patient Portal offered by [facility] by registering at the following website: [email or web address]. By joining [employer]’s FollowMyHealth portal, you will also be able to view your health information using other applications (apps) compatible with our system.

## 2020-01-12 NOTE — ED PROVIDER NOTE - NSFOLLOWUPINSTRUCTIONS_ED_ALL_ED_FT
Log Out.    [employer] CareNotes®     :  [facility]             VIRAL SYNDROME - AfterCare(R) Instructions(ER/ED)     Viral Syndrome    WHAT YOU NEED TO KNOW:    Viral syndrome is a term used for symptoms of an infection caused by a virus. Viruses are spread easily from person to person through the air and on shared items. An illness caused by a virus usually goes away in 10 to 14 days without treatment. Antibiotics are not given for a viral infection.     DISCHARGE INSTRUCTIONS:    Call 911 for the following:     You have a seizure.       You cannot be woken.       You have chest pain or trouble breathing.     Return to the emergency department if:     You have a stiff neck, a bad headache, and sensitivity to light.       You feel weak, dizzy, or confused.       You stop urinating or urinate a lot less than normal.       You cough up blood or thick, yellow or green, mucus.       You have severe abdominal pain or your abdomen is larger than usual.     Contact your healthcare provider if:     Your symptoms do not get better with treatment, or get worse, after 3 days.       You have a rash or ear pain.       You have burning when you urinate.       You have questions or concerns about your condition or care.    Medicines: You may need any of the following:     Acetaminophen decreases pain and fever. It is available without a doctor's order. Ask how much medicine to take and how often to take it. Follow directions. Acetaminophen can cause liver damage if not taken correctly.       NSAIDs, such as ibuprofen, help decrease swelling, pain, and fever. NSAIDs can cause stomach bleeding or kidney problems in certain people. If you take blood thinner medicine, always ask your healthcare provider if NSAIDs are safe for you. Always read the medicine label and follow directions.      Cold medicine helps decrease swelling, control a cough, and relieve chest or nasal congestion.       Saline nasal spray helps decrease nasal congestion.       Take your medicine as directed. Contact your healthcare provider if you think your medicine is not helping or if you have side effects. Tell him of her if you are allergic to any medicine. Keep a list of the medicines, vitamins, and herbs you take. Include the amounts, and when and why you take them. Bring the list or the pill bottles to follow-up visits. Carry your medicine list with you in case of an emergency.    Manage your symptoms:     Drink liquids as directed to prevent dehydration. Ask how much liquid to drink each day and which liquids are best for you. Ask if you should drink an oral rehydration solution (ORS). An ORS has the right amounts of water, salts, and sugar you need to replace body fluids. This may help prevent dehydration caused by vomiting or diarrhea. Do not drink liquids with caffeine. Drinks with caffeine can make dehydration worse.       Get plenty of rest to help your body heal. Take naps throughout the day. Ask your healthcare provider when you can return to work and your normal activities.       Use a cool mist humidifier to help you breathe easier if you have nasal or chest congestion. Ask your healthcare provider how to use a cool mist humidifier.       Eat honey or use cough drops to help decrease throat discomfort. Ask your healthcare provider how much honey you should eat each day. Cough drops are available without a doctor's order. Follow directions for taking cough drops.       Do not smoke and stay away from others who smoke. Nicotine and other chemicals in cigarettes and cigars can cause lung damage. Smoking can also delay healing. Ask your healthcare provider for information if you currently smoke and need help to quit. E-cigarettes or smokeless tobacco still contain nicotine. Talk to your healthcare provider before you use these products.       Wash your hands frequently to prevent the spread of germs to others. Use soap and water. Use gel hand  when soap and water are not available. Wash your hands after you use the bathroom, cough, or sneeze. Wash your hands before you prepare or eat food.     Follow up with your healthcare provider as directed: Write down your questions so you remember to ask them during your visits.

## 2020-01-12 NOTE — ED ADULT NURSE NOTE - PMH
Asthma    COPD (chronic obstructive pulmonary disease)    No pertinent past medical history    Smoking

## 2020-01-16 DIAGNOSIS — Z79.52 LONG TERM (CURRENT) USE OF SYSTEMIC STEROIDS: ICD-10-CM

## 2020-01-16 DIAGNOSIS — Z79.1 LONG TERM (CURRENT) USE OF NON-STEROIDAL ANTI-INFLAMMATORIES (NSAID): ICD-10-CM

## 2020-01-16 DIAGNOSIS — Z88.1 ALLERGY STATUS TO OTHER ANTIBIOTIC AGENTS STATUS: ICD-10-CM

## 2020-01-16 DIAGNOSIS — Z91.018 ALLERGY TO OTHER FOODS: ICD-10-CM

## 2020-01-16 DIAGNOSIS — F17.200 NICOTINE DEPENDENCE, UNSPECIFIED, UNCOMPLICATED: ICD-10-CM

## 2020-01-16 DIAGNOSIS — Z88.0 ALLERGY STATUS TO PENICILLIN: ICD-10-CM

## 2020-01-16 DIAGNOSIS — Z79.899 OTHER LONG TERM (CURRENT) DRUG THERAPY: ICD-10-CM

## 2020-01-16 DIAGNOSIS — Z79.2 LONG TERM (CURRENT) USE OF ANTIBIOTICS: ICD-10-CM

## 2020-01-16 DIAGNOSIS — B34.9 VIRAL INFECTION, UNSPECIFIED: ICD-10-CM

## 2020-01-16 DIAGNOSIS — R07.0 PAIN IN THROAT: ICD-10-CM

## 2020-02-01 ENCOUNTER — OUTPATIENT (OUTPATIENT)
Dept: OUTPATIENT SERVICES | Facility: HOSPITAL | Age: 60
LOS: 1 days | End: 2020-02-01
Payer: MEDICARE
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
## 2020-02-01 PROCEDURE — G9001: CPT

## 2020-02-13 DIAGNOSIS — Z71.89 OTHER SPECIFIED COUNSELING: ICD-10-CM

## 2020-05-07 NOTE — ED ADULT TRIAGE NOTE - WEIGHT IN KG
Received a fax from [facility] with [facility] requesting most recent progress notes.    As requested, most recent clinic note (from 5/1/20) faxed to dilcia Motta ([phone]). Fax confirmation received.   
70.3

## 2020-12-04 ENCOUNTER — EMERGENCY (EMERGENCY)
Facility: HOSPITAL | Age: 60
LOS: 1 days | Discharge: HOME | End: 2020-12-04
Attending: EMERGENCY MEDICINE | Admitting: EMERGENCY MEDICINE
Payer: MEDICARE

## 2020-12-04 VITALS
OXYGEN SATURATION: 94 % | RESPIRATION RATE: 18 BRPM | TEMPERATURE: 98 F | SYSTOLIC BLOOD PRESSURE: 140 MMHG | WEIGHT: 139.99 LBS | HEIGHT: 65 IN | DIASTOLIC BLOOD PRESSURE: 87 MMHG | HEART RATE: 75 BPM
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
## 2020-12-04 DIAGNOSIS — Z88.0 ALLERGY STATUS TO PENICILLIN: ICD-10-CM
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
## 2020-12-04 DIAGNOSIS — R51.9 HEADACHE, UNSPECIFIED: ICD-10-CM
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
## 2020-12-04 DIAGNOSIS — S09.90XA UNSPECIFIED INJURY OF HEAD, INITIAL ENCOUNTER: ICD-10-CM

## 2020-12-04 DIAGNOSIS — Z91.018 ALLERGY TO OTHER FOODS: ICD-10-CM

## 2020-12-04 PROCEDURE — 70450 CT HEAD/BRAIN W/O DYE: CPT | Mod: 26

## 2020-12-04 PROCEDURE — 99284 EMERGENCY DEPT VISIT MOD MDM: CPT

## 2020-12-04 RX ORDER — IBUPROFEN 200 MG
600 TABLET ORAL ONCE
Refills: 0 | Status: COMPLETED | OUTPATIENT
Start: 2020-12-04 | End: 2020-12-04

## 2020-12-04 RX ORDER — ONDANSETRON 8 MG/1
4 TABLET, FILM COATED ORAL ONCE
Refills: 0 | Status: COMPLETED | OUTPATIENT
Start: 2020-12-04 | End: 2020-12-04

## 2020-12-04 RX ADMIN — ONDANSETRON 4 MILLIGRAM(S): 8 TABLET, FILM COATED ORAL at 03:31

## 2020-12-04 RX ADMIN — Medication 600 MILLIGRAM(S): at 03:32

## 2020-12-04 NOTE — ED ADULT TRIAGE NOTE - CHIEF COMPLAINT QUOTE
Pt walks in c/o headache since 11/20/2020. Pt states she fell and hit her head on 11/20 and has been having pain since. Pt denies LOC and blood thinner use.

## 2020-12-04 NOTE — ED PROVIDER NOTE - PATIENT PORTAL LINK FT
You can access the FollowMyHealth Patient Portal offered by [facility] by registering at the following website: [email or web address]. By joining [email or web address]’s FollowMyHealth portal, you will also be able to view your health information using other applications (apps) compatible with our system.

## 2020-12-04 NOTE — ED ADULT NURSE NOTE - CHPI ED NUR SYMPTOMS NEG
no vomiting/no tingling/no weakness/no dizziness/no fever/no chills/no decreased eating/drinking/no nausea

## 2020-12-04 NOTE — ED PROVIDER NOTE - OBJECTIVE STATEMENT
59 yo F, no significant pmhx per patient, presents with R sided headache for several days. patient notes she hit her R side of her head at night against a wall or bed post but isn't sure. unsure of LOC. denies N/V, denies cp, sob or palpitations. denies etoh or drug use. denies anticoagulants.

## 2021-01-11 ENCOUNTER — EMERGENCY (EMERGENCY)
Facility: HOSPITAL | Age: 61
LOS: 1 days | Discharge: HOME | End: 2021-01-11
Admitting: EMERGENCY MEDICINE
Payer: MEDICARE

## 2021-01-11 VITALS
RESPIRATION RATE: 16 BRPM | HEIGHT: 65 IN | HEART RATE: 80 BPM | TEMPERATURE: 98 F | WEIGHT: 195.11 LBS | SYSTOLIC BLOOD PRESSURE: 127 MMHG | OXYGEN SATURATION: 97 % | DIASTOLIC BLOOD PRESSURE: 87 MMHG
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
## 2021-01-11 DIAGNOSIS — H92.01 OTALGIA, RIGHT EAR: ICD-10-CM

## 2021-01-11 DIAGNOSIS — H60.91 UNSPECIFIED OTITIS EXTERNA, RIGHT EAR: ICD-10-CM

## 2021-01-11 PROCEDURE — 99283 EMERGENCY DEPT VISIT LOW MDM: CPT

## 2021-01-11 RX ORDER — IBUPROFEN 200 MG
600 TABLET ORAL ONCE
Refills: 0 | Status: COMPLETED | OUTPATIENT
Start: 2021-01-11 | End: 2021-01-11

## 2021-01-11 RX ORDER — OFLOXACIN OTIC SOLUTION 3 MG/ML
5 SOLUTION/ DROPS AURICULAR (OTIC)
Qty: 100 | Refills: 0
Start: 2021-01-11 | End: 2021-01-20

## 2021-01-11 RX ADMIN — Medication 600 MILLIGRAM(S): at 02:38

## 2021-01-11 NOTE — ED ADULT TRIAGE NOTE - CHIEF COMPLAINT QUOTE
Pt with complaint of right ear pain X 2 days. States she underwent an endoscopy on 12/22 to check if she had throat cancer. Pt reports raspiness in her voice that started the same time.

## 2021-01-11 NOTE — ED PROVIDER NOTE - OBJECTIVE STATEMENT
Patient PMHX tobacco use presents with R ear pain x 2 days. denies fever, chills nightsweats, trauma.

## 2021-01-11 NOTE — ED PROVIDER NOTE - PATIENT PORTAL LINK FT
You can access the FollowMyHealth Patient Portal offered by [facility] by registering at the following website: [email or web address]. By joining [employer]’s FollowMyHealth portal, you will also be able to view your health information using other applications (apps) compatible with our system.

## 2021-01-11 NOTE — ED ADULT NURSE NOTE - OBJECTIVE STATEMENT
Pt aox3.  Here for right ear and throat pain.  Pt rpts having a procedure 3 days ago stating "They cleaned out my throat because they were looking for cancer and my ear" Pt aox3.  Here for right ear and throat pain.  Pt rpts having a procedure 3 days ago stating "They cleaned out my throat because they were looking for cancer and my ear and throat started after that"

## 2021-01-11 NOTE — ED ADULT NURSE NOTE - NSFALLRSKASSESASSIST_ED_ALL_ED
no

## 2021-01-11 NOTE — ED PROVIDER NOTE - CARE PROVIDERS DIRECT ADDRESSES
[email or web address]

## 2021-01-11 NOTE — ED PROVIDER NOTE - CARE PROVIDER_API CALL
Everton Casas)  Otolaryngology  [address] [place] [address], [address] [place] [address]  Phone: [phone]  Fax: [phone]  Follow Up Time:     Junior Mtz)  Otolaryngology  [address] [facility], [address]nd [place] [address]  Phone: [phone]  Fax: [phone]  Follow Up Time:

## 2021-01-11 NOTE — ED PROVIDER NOTE - CLINICAL SUMMARY MEDICAL DECISION MAKING FREE TEXT BOX
Patient with R ear pain x 2 days found to have otitis externa. rx sent for floxin otic. advised follow up with ENT

## 2021-07-29 NOTE — ED ADULT NURSE NOTE - CHPI ED NUR CONTEXT2
Contacted pt to discuss injections. Advised pt that different injections would still need to be auth'd and LOV notes do not mention alternative injections. Pt reiterates that  cannot drive and then asks what happened with the auth on the RFA.  Advise
Pt calling to see if she is able to have two steroid injections, one in each shoulder or in the top of her trap. Pt states that her  is having knee surgery and she would not have any transportation to a procedure with sedation.  Pt is looking for adv
unknown

## 2022-01-12 ENCOUNTER — EMERGENCY (EMERGENCY)
Facility: HOSPITAL | Age: 62
LOS: 1 days | Discharge: HOME | End: 2022-01-12
Admitting: EMERGENCY MEDICINE
Payer: MEDICARE

## 2022-01-12 VITALS
HEART RATE: 70 BPM | TEMPERATURE: 98 F | HEIGHT: 65 IN | SYSTOLIC BLOOD PRESSURE: 121 MMHG | RESPIRATION RATE: 16 BRPM | WEIGHT: 195.11 LBS | DIASTOLIC BLOOD PRESSURE: 77 MMHG | OXYGEN SATURATION: 98 %

## 2022-01-12 DIAGNOSIS — R51.9 HEADACHE, UNSPECIFIED: ICD-10-CM

## 2022-01-12 DIAGNOSIS — R10.9 UNSPECIFIED ABDOMINAL PAIN: ICD-10-CM
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
## 2022-01-12 DIAGNOSIS — Z87.891 PERSONAL HISTORY OF NICOTINE DEPENDENCE: ICD-10-CM

## 2022-01-12 DIAGNOSIS — Z91.018 ALLERGY TO OTHER FOODS: ICD-10-CM

## 2022-01-12 DIAGNOSIS — J44.9 CHRONIC OBSTRUCTIVE PULMONARY DISEASE, UNSPECIFIED: ICD-10-CM

## 2022-01-12 DIAGNOSIS — Z88.0 ALLERGY STATUS TO PENICILLIN: ICD-10-CM

## 2022-01-12 DIAGNOSIS — Z88.1 ALLERGY STATUS TO OTHER ANTIBIOTIC AGENTS STATUS: ICD-10-CM
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
## 2022-01-12 PROCEDURE — 74019 RADEX ABDOMEN 2 VIEWS: CPT | Mod: 26

## 2022-01-12 PROCEDURE — 99284 EMERGENCY DEPT VISIT MOD MDM: CPT | Mod: 25

## 2022-01-12 RX ORDER — ACETAMINOPHEN 500 MG
650 TABLET ORAL ONCE
Refills: 0 | Status: COMPLETED | OUTPATIENT
Start: 2022-01-12 | End: 2022-01-12

## 2022-01-12 RX ADMIN — Medication 650 MILLIGRAM(S): at 19:23

## 2022-01-12 NOTE — ED ADULT NURSE NOTE - NSICDXPASTMEDICALHX_GEN_ALL_CORE_FT
PAST MEDICAL HISTORY:  Asthma     COPD (chronic obstructive pulmonary disease)     No pertinent past medical history     Smoking

## 2022-01-12 NOTE — ED ADULT NURSE NOTE - CAS ELECT INFOMATION PROVIDED
DC instructions pt refused final set of vital signs stating "I don't need them, I just want to go home and rest, I already have my coat and everything on, plus you guys already did them when I first got here"/DC instructions pt refused final set of vital signs stating "I don't need them, I just want to go home and rest, I already have my coat on and everything on, plus you guys already did them when I first got here"/DC instructions
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
## 2022-01-12 NOTE — ED PROVIDER NOTE - DOMESTIC TRAVEL HIGH RISK QUESTION
Physical Therapy  Treatment Note (PT)    Patient Name: Del Mckeon  Age: 59 y.o.  Gender: male    ----------------------------------------------------------------------------------------------------------------------       06/03/20 1230   Time Calculation   Start Time 1230   Stop Time 1300   Time Calculation (min) 30 min   PT Last Visit   PT Received On 06/03/20   Pain Assessment   Pain Assessment No/denies pain   General   Chart Reviewed Yes   Therapy Treatment Diagnosis CVA with L weakness   Precautions   Reinforced Precautions Yes   Other Precautions falls, alarms   Subjective Comments   Subjective Comments Pt agrees to PT. Sitting in w/c at end of session with call light/desired items in reach, chair alarm engaged.    Cognition   Arousal/Alertness WFL   Transfer 1   Trials/Comments 1 SBA for all transfer (sit/stand from w/c,  w/c to/from recumbent bike). Gait belt on. Stabilizes in standing with BUE on FWW; without FWW can require CGA support.    Ambulation 1   Comments/Distance 1 Walks 40mx2 with SBA and w/c follow with FWW, decreased L stance and R lateral truncal lean, no LOB, able to negotiate 90 degree turns through doorway.   Dynamic Standing Balance   Dynamic Standing-Comments CGA to min A for high level dynamic activities, see Marco Antonio.    Equipment Use   Recumbent Bike  10 min on Sci[employer] recumbent at level 2 and METS 2.5. Pt educated on neuroplasticity/benefits of aerobic activity on brain health with pt demonstrating engagement with discussion. Cycles with BUE and BLE.   Activity Tolerance   Activity Tolerance Comments Tolerates well.    Assessment   Rehab Potential Good   Progress Progressing toward goals   Problem List Decreased strength;Impaired balance;Decreased mobility   Assessment Comment Pt tolerates session without adverse effects. Demonstrates potential to improve his overall activity tolerance/balance in a variety of functional settings to minimize risk of falls in home setting.    Recommendation   Recommendations for Therapy Continue skilled therapy         _________________  Gemma Szymanski, PT  06/03/20 3:38 PM     No
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
## 2022-01-12 NOTE — ED PROVIDER NOTE - PATIENT PORTAL LINK FT
You can access the FollowMyHealth Patient Portal offered by [facility] by registering at the following website: [email or web address]. By joining [employer]’s FollowMyHealth portal, you will also be able to view your health information using other applications (apps) compatible with our system.
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
## 2022-07-07 NOTE — CHART NOTE - NSCHARTNOTESELECT_GEN_ALL_CORE
Hpi Title: Evaluation of Skin Lesions Additional History: Patient presents today for a full body skin exam with no concerns. Patient has a history of basal cell carcinoma and squamous cell carcinoma but is unsure how many. Patient states she has family history of melanoma which her mother has had. Event Note

## 2022-09-30 ENCOUNTER — EMERGENCY (EMERGENCY)
Facility: HOSPITAL | Age: 62
LOS: 1 days | Discharge: HOME | End: 2022-09-30
Attending: EMERGENCY MEDICINE | Admitting: EMERGENCY MEDICINE

## 2022-09-30 VITALS
TEMPERATURE: 98 F | OXYGEN SATURATION: 95 % | SYSTOLIC BLOOD PRESSURE: 121 MMHG | HEIGHT: 65 IN | HEART RATE: 73 BPM | RESPIRATION RATE: 18 BRPM | DIASTOLIC BLOOD PRESSURE: 81 MMHG

## 2022-09-30 VITALS
TEMPERATURE: 98 F | DIASTOLIC BLOOD PRESSURE: 87 MMHG | RESPIRATION RATE: 16 BRPM | OXYGEN SATURATION: 97 % | HEART RATE: 66 BPM | SYSTOLIC BLOOD PRESSURE: 155 MMHG
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
## 2022-09-30 PROCEDURE — 71046 X-RAY EXAM CHEST 2 VIEWS: CPT | Mod: 26

## 2022-09-30 PROCEDURE — 72131 CT LUMBAR SPINE W/O DYE: CPT | Mod: 26

## 2022-09-30 PROCEDURE — 72125 CT NECK SPINE W/O DYE: CPT | Mod: 26

## 2022-09-30 PROCEDURE — 99285 EMERGENCY DEPT VISIT HI MDM: CPT | Mod: 25

## 2022-09-30 PROCEDURE — 70450 CT HEAD/BRAIN W/O DYE: CPT | Mod: 26

## 2022-09-30 PROCEDURE — 73610 X-RAY EXAM OF ANKLE: CPT | Mod: 26,RT

## 2022-09-30 PROCEDURE — 93010 ELECTROCARDIOGRAM REPORT: CPT

## 2022-09-30 RX ORDER — OXYCODONE AND ACETAMINOPHEN 5; 325 MG/1; MG/1
1 TABLET ORAL ONCE
Refills: 0 | Status: DISCONTINUED | OUTPATIENT
Start: 2022-09-30 | End: 2022-09-30

## 2022-09-30 RX ORDER — IBUPROFEN 200 MG
1 TABLET ORAL
Qty: 20 | Refills: 0
Start: 2022-09-30

## 2022-09-30 RX ORDER — OXYCODONE AND ACETAMINOPHEN 5; 325 MG/1; MG/1
1 TABLET ORAL
Qty: 12 | Refills: 0
Start: 2022-09-30

## 2022-09-30 RX ORDER — KETOROLAC TROMETHAMINE 30 MG/ML
15 SYRINGE (ML) INJECTION ONCE
Refills: 0 | Status: DISCONTINUED | OUTPATIENT
Start: 2022-09-30 | End: 2022-09-30

## 2022-09-30 RX ADMIN — Medication 15 MILLIGRAM(S): at 17:16

## 2022-09-30 RX ADMIN — OXYCODONE AND ACETAMINOPHEN 1 TABLET(S): 5; 325 TABLET ORAL at 14:07

## 2022-09-30 NOTE — ED PROVIDER NOTE - NSFOLLOWUPINSTRUCTIONS_ED_ALL_ED_FT
-PLEASE FOLLOW-UP WITH YOUR PRIMARY CARE DOCTOR IN 1-2 DAYS.  BRING ALL PAPERWORK FROM TODAY'S VISIT TO YOUR FOLLOW-UP VISIT.   -PLEASE GO TO YOUR PHARMACY TO FILL YOUR PRESCRIPTIONS.  PLEASE START TODAY AND USE AS DIRECTED.  -PLEASE RETURN TO THE ER IMMEDIATELY OR CALL 911 FOR ANY HIGH FEVER, TROUBLE BREATHING, VOMITING, SEVERE PAIN, OR ANY OTHER CONCERNS.    Cervical Sprain    WHAT YOU NEED TO KNOW:    A cervical sprain is a stretched or torn muscle or ligament in your neck. Ligaments are strong tissues that connect bones.    DISCHARGE INSTRUCTIONS:    Return to the emergency department if:   •You have pain or numbness from your shoulder down to your hand.      •You have problems with your vision, hearing, or balance.      •You feel confused or cannot concentrate.      •You have problems with movement and strength.      Call your doctor if:   •You have increased swelling or pain in your neck.       •You have questions or concerns about your condition or care.      Medicines: You may need any of the following:   •Acetaminophen decreases pain and fever. It is available without a doctor's order. Ask how much to take and how often to take it. Follow directions. Read the labels of all other medicines you are using to see if they also contain acetaminophen, or ask your doctor or pharmacist. Acetaminophen can cause liver damage if not taken correctly.      •NSAIDs, such as ibuprofen, help decrease swelling, pain, and fever. This medicine is available with or without a doctor's order. NSAIDs can cause stomach bleeding or kidney problems in certain people. If you take blood thinner medicine, always ask your healthcare provider if NSAIDs are safe for you. Always read the medicine label and follow directions.      •Muscle relaxers help decrease pain and muscle spasms.      •Prescription pain medicine may be given. Ask your healthcare provider how to take this medicine safely. Some prescription pain medicines contain acetaminophen. Do not take other medicines that contain acetaminophen without talking to your healthcare provider. Too much acetaminophen may cause liver damage. Prescription pain medicine may cause constipation. Ask your healthcare provider how to prevent or treat constipation.       •Take your medicine as directed. Contact your healthcare provider if you think your medicine is not helping or if you have side effects. Tell your provider if you are allergic to any medicine. Keep a list of the medicines, vitamins, and herbs you take. Include the amounts, and when and why you take them. Bring the list or the pill bottles to follow-up visits. Carry your medicine list with you in case of an emergency.      Manage your symptoms:   •Apply heat on your neck for 15 to 20 minutes, 4 to 6 times a day or as directed. Heat helps decrease pain, stiffness, and muscle spasms.      •Begin gentle neck exercises as soon as you can move your neck without pain. Exercises will help decrease stiffness and improve the strength and movement of your neck. Ask your healthcare provider what kind of exercises you should do.      •Gradually return to your usual activities as directed. Stop if you have pain. Avoid activities that can cause more damage to your neck, such as heavy lifting or strenuous exercise.      •Sleep without a pillow to help decrease pain. Instead, roll a small towel tightly and place it under your neck.       •Go to physical therapy as directed. A physical therapist teaches you exercises to help improve movement and strength, and to decrease pain.       Prevent another neck injury:   •Drive safely. Make sure everyone in your car wears a seatbelt. A seatbelt can save your life if you are in an accident. Do not use your cell phone when you are driving. This could distract you and cause an accident. Pull over if you need to make a call or send a text message.       •Wear helmets, lifejackets, and protective gear. Always wear a helmet when you ride a bike or motorcycle, go skiing, or play sports that could cause a head injury. Wear protective equipment when you play sports. Wear a lifejacket when you are on a boat or doing water sports.       Follow up with your doctor as directed: You may be referred to an orthopedist or a physical therapist. Write down your questions so you remember to ask them during your visits.
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
## 2022-09-30 NOTE — ED ADULT NURSE NOTE - NSIMPLEMENTINTERV_GEN_ALL_ED
Implemented All Universal Safety Interventions:  [place] to call system. Call bell, personal items and telephone within reach. Instruct patient to call for assistance. Room bathroom lighting operational. Non-slip footwear when patient is off stretcher. Physically safe environment: no spills, clutter or unnecessary equipment. Stretcher in lowest position, wheels locked, appropriate side rails in place.

## 2022-09-30 NOTE — ED PROVIDER NOTE - PHYSICAL EXAMINATION
VITAL SIGNS: I have reviewed nursing notes and confirm.  CONSTITUTIONAL: Well-developed; well-nourished.  Lying in stretcher but is able to get out of stretcher and walk around exam area on her own.   SKIN: Skin is warm and dry, no acute rash/lacs/abrasions/ecchymoses.   HEAD: Normocephalic; atraumatic.  EYES: PERRL, EOM intact; conjunctiva and sclera clear.  ENT: No nasal discharge; airway clear.  NECK: Supple; non tender.  CARD: S1, S2 normal; no murmurs, gallops, or rubs. Regular rate and rhythm.  RESP: No wheezes, rales or rhonchi.  ABD: Normal bowel sounds; soft; non-distended; non-tender; no hepatosplenomegaly.  MSK: Normal ROM throughout.  No deformities on extremities.  R ankle - +TTP over Lateral mal but no deformity or edema.    NEURO: Patient is alert, oriented x person, place and time.  Cranial nerves 2-12 are intact.  Normal gait and speech.  Cerebellar testing normal:  negative Romberg, normal coordination and normal finger to nose, heal to shin and rapid alternating movements.  Normal proprioception and sensory exam.  No pronator drift.  5/5 bl upper extremity and lower extremity strength.  PSYCH: Cooperative, appropriate. 2.55 2.497 2.595
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
## 2022-10-03 DIAGNOSIS — Z88.1 ALLERGY STATUS TO OTHER ANTIBIOTIC AGENTS STATUS: ICD-10-CM

## 2022-10-03 DIAGNOSIS — M54.2 CERVICALGIA: ICD-10-CM
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
## 2022-10-03 DIAGNOSIS — M54.50 LOW BACK PAIN, UNSPECIFIED: ICD-10-CM

## 2022-10-03 DIAGNOSIS — F17.210 NICOTINE DEPENDENCE, CIGARETTES, UNCOMPLICATED: ICD-10-CM

## 2022-10-03 DIAGNOSIS — Z88.0 ALLERGY STATUS TO PENICILLIN: ICD-10-CM

## 2022-10-03 DIAGNOSIS — J44.9 CHRONIC OBSTRUCTIVE PULMONARY DISEASE, UNSPECIFIED: ICD-10-CM

## 2022-10-03 DIAGNOSIS — T14.8XXA OTHER INJURY OF UNSPECIFIED BODY REGION, INITIAL ENCOUNTER: ICD-10-CM

## 2022-10-03 DIAGNOSIS — Z91.018 ALLERGY TO OTHER FOODS: ICD-10-CM

## 2022-10-21 NOTE — ED ADULT NURSE NOTE - CHPI ED NUR SYMPTOMS NEG
no abrasion/no blurred vision/no change in level of consciousness/no chest pain/no chest wall tenderness
Dami Berrios (NP)

## 2023-01-19 ENCOUNTER — EMERGENCY (EMERGENCY)
Facility: HOSPITAL | Age: 63
LOS: 1 days | Discharge: HOME | End: 2023-01-19
Admitting: EMERGENCY MEDICINE
Payer: MEDICARE

## 2023-01-19 VITALS
TEMPERATURE: 99 F | DIASTOLIC BLOOD PRESSURE: 83 MMHG | RESPIRATION RATE: 18 BRPM | OXYGEN SATURATION: 95 % | HEIGHT: 65 IN | HEART RATE: 80 BPM | SYSTOLIC BLOOD PRESSURE: 129 MMHG | WEIGHT: 179.9 LBS

## 2023-01-19 DIAGNOSIS — R42 DIZZINESS AND GIDDINESS: ICD-10-CM

## 2023-01-19 DIAGNOSIS — J18.9 PNEUMONIA, UNSPECIFIED ORGANISM: ICD-10-CM

## 2023-01-19 DIAGNOSIS — Z91.018 ALLERGY TO OTHER FOODS: ICD-10-CM

## 2023-01-19 DIAGNOSIS — Z20.822 CONTACT WITH AND (SUSPECTED) EXPOSURE TO COVID-19: ICD-10-CM

## 2023-01-19 DIAGNOSIS — Z88.1 ALLERGY STATUS TO OTHER ANTIBIOTIC AGENTS STATUS: ICD-10-CM

## 2023-01-19 DIAGNOSIS — R05.1 ACUTE COUGH: ICD-10-CM

## 2023-01-19 DIAGNOSIS — Z88.0 ALLERGY STATUS TO PENICILLIN: ICD-10-CM

## 2023-01-19 DIAGNOSIS — J44.9 CHRONIC OBSTRUCTIVE PULMONARY DISEASE, UNSPECIFIED: ICD-10-CM
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
## 2023-01-19 PROCEDURE — 71046 X-RAY EXAM CHEST 2 VIEWS: CPT | Mod: 26

## 2023-01-19 PROCEDURE — 99284 EMERGENCY DEPT VISIT MOD MDM: CPT

## 2023-01-19 NOTE — ED ADULT TRIAGE NOTE - CHIEF COMPLAINT QUOTE
female patient walk in here for eval of coughing up blood and dizziness w/ chest discomfort x 2 days. patient endorses hx of COPD denies cigarette smoke. patient states she takes medication for COPD but cannot recall names.

## 2023-01-20 LAB
FLUAV AG NPH QL: SIGNIFICANT CHANGE UP
FLUBV AG NPH QL: SIGNIFICANT CHANGE UP
RSV RNA NPH QL NAA+NON-PROBE: SIGNIFICANT CHANGE UP
SARS-COV-2 RNA SPEC QL NAA+PROBE: SIGNIFICANT CHANGE UP

## 2023-01-20 RX ORDER — CEFPODOXIME PROXETIL 100 MG
200 TABLET ORAL ONCE
Refills: 0 | Status: COMPLETED | OUTPATIENT
Start: 2023-01-20 | End: 2023-01-20

## 2023-01-20 RX ORDER — CEFPODOXIME PROXETIL 100 MG
1 TABLET ORAL
Qty: 14 | Refills: 0
Start: 2023-01-20 | End: 2023-01-26

## 2023-01-20 RX ADMIN — Medication 100 MILLIGRAM(S): at 01:01

## 2023-01-20 RX ADMIN — Medication 200 MILLIGRAM(S): at 01:01

## 2023-01-20 NOTE — ED PROVIDER NOTE - NSFOLLOWUPINSTRUCTIONS_ED_ALL_ED_FT
Pneumonia    Pneumonia is an infection of the lungs. Pneumonia may be caused by bacteria, viruses, or funguses. Symptoms include coughing, fever, chest pain when breathing deeply or coughing, shortness of breath, fatigue, or muscle aches. Pneumonia can be diagnosed with a medical history and physical exam, as well as other tests which may include a chest X-ray. If you were prescribed an antibiotic medicine, take it as told by your health care provider and do not stop taking the antibiotic even if you start to feel better. Do not use tobacco products, including cigarettes, chewing tobacco, and e-cigarettes.    SEEK IMMEDIATE MEDICAL CARE IF YOU HAVE THE FOLLOWING SYMPTOMS: worsening shortness of breath, worsening chest pain, coughing up blood, change in mental status, lightheadedness/dizziness. well-groomed/well-developed/no distress/well-nourished/obese

## 2023-01-20 NOTE — ED PROVIDER NOTE - CLINICAL SUMMARY MEDICAL DECISION MAKING FREE TEXT BOX
pt here with cough and mild dizziness ongoing for 2 d in duration, well appearing non toxic with clear lung sounds on exam will obtain screening cxr and ecg.

## 2023-01-20 NOTE — ED PROVIDER NOTE - PATIENT PORTAL LINK FT
You can access the FollowMyHealth Patient Portal offered by [facility] by registering at the following website: [email or web address]. By joining [employer]’s FollowMyHealth portal, you will also be able to view your health information using other applications (apps) compatible with our system.

## 2023-01-20 NOTE — ED PROVIDER NOTE - PHYSICAL EXAMINATION
Physical Exam    Vital Signs: I have reviewed the initial vital signs.  Constitutional: well-nourished, appears stated age, no acute distress  Eyes: PERRLA, and symmetrical lids.  ENT: neck supple with no adenopathy, moist MM.  Cardiovascular: +S1/S2, no murmurs, regular rate, regular rhythm, well-perfused extremities  Respiratory: unlabored respiratory effort, clear to auscultation bilaterally, speaks in full sentences  Gastrointestinal: soft, non-tender abdomen, non distended

## 2023-01-20 NOTE — ED PROVIDER NOTE - NS ED ROS FT
Review of Systems    Constitutional: (-) fever (-) weakness (-) diaphoresis   Eyes: (-) change in vision (-) photophobia (-) eye pain  ENT: (-) sore throat (-) ear ache (-) nasal discharge  Cardiovascular: (-) chest pain (-) palpitations  Respiratory: (-) SOB   GI: (-) abdominal pain (-) N/V (-) diarrhea  Integumentary: (-) rash (-) redness   Neurological:  (-) focal deficit (-) altered mental status

## 2023-01-20 NOTE — ED PROVIDER NOTE - OBJECTIVE STATEMENT
63 yo f pmhx sig for copd/asthma pw cough x2 in duration with dizziness ongoing for 2 d in duration w/o any sob, no cp or palpitations. Denies fevers, chills, abd pain, n/v.    I have reviewed available current nursing and previous documentation of past medical, surgical, family, and/or social history.

## 2023-02-15 NOTE — ED ADULT NURSE NOTE - ALCOHOL PRE SCREEN (AUDIT - C)
Detail Level: Zone Continue Regimen: Acyclovir cr \\nAcyclovir pills Render In Strict Bullet Format?: No Statement Selected

## 2023-08-05 NOTE — ED PROVIDER NOTE - IV ALTEPLASE DOOR HIDDEN
Consultation - Electrophysiology-Cardiology (EP)   Raymond Santana 80 y.o. female MRN: [id number]  Unit/Bed#: -01 Encounter: [id number]      1. Syncope and collapse  Inpatient consult to Electrophysiology    Inpatient consult to Electrophysiology    Case Request Cath Lab: Cardiac pacer implant    Case Request Cath Lab: Cardiac pacer implant    CANCELED: Consult to cardiology    CANCELED: Consult to cardiology      2.  Sinus pause  Inpatient consult to Electrophysiology    Inpatient consult to Electrophysiology    CANCELED: Consult to cardiology    CANCELED: Consult to cardiology            Consults  Physician Requesting Consult: Milton Lewis DO , Nishant Julian MD  Reason for Consult / Principal Problem: Symptomatic pauses       Summary of my recommendation  Proceed with dual chamber PPM , left side   Use limited contrast and antibiotic  Physiologic pacing leads    Convert to eliquis from heparin     Small risk of pneumothorax, lead dislodgement and pericardial effusion          Clinical conditions   Sick sinu syndrome   Syncope and pre syncope   Paroxysmal atrial fibrillation  Hypertension  Hyperlipidemia  PAD s/p right SFA and popliteal PCI  history of CVA          Assessment/Plan     Sick sinu syndrome   Syncope and pre syncope   Paroxysmal atrial fibrillation  Hypertension  Hyperlipidemia  PAD s/p right SFA and popliteal PCI  history of CVA      Proceed with dual chamber PPM , left side   Use limited contrast and antibiotic  Physiologic pacing leads    Convert to eliquis from heparin         History of Present Illness   HPI: Raymond Santana is a 80y.o. year old female has been referred to me by Dr Ty Hatchet for the management of episode of syncope     The patient has significant medical illnesses which include  Sick sinu syndrome   Syncope and pre syncope   Paroxysmal atrial fibrillation  Hypertension  Hyperlipidemia  PAD s/p right SFA and popliteal PCI  history of CVA    Patient has ecchymosis and also some injury from fall       She is oriented and agrees to procedure           Historical Information   Past Medical History:   Diagnosis Date   • PMR (polymyalgia rheumatica) ([facility])    • Stroke [facility])      Past Surgical History:   Procedure Laterality Date   • IR AORTAGRAM WITH RUN-OFF  3/9/2021   • REPLACEMENT TOTAL KNEE       Social History     Substance and Sexual Activity   Alcohol Use No     Social History     Substance and Sexual Activity   Drug Use No     Social History     Tobacco Use   Smoking Status Never   • Passive exposure: Never   Smokeless Tobacco Never     Social History     Socioeconomic History   • Marital status: /Civil Union     Spouse name: Not on file   • Number of children: Not on file   • Years of education: Not on file   • Highest education level: Not on file   Occupational History   • Not on file   Tobacco Use   • Smoking status: Never     Passive exposure: Never   • Smokeless tobacco: Never   Vaping Use   • Vaping Use: Never used   Substance and Sexual Activity   • Alcohol use: No   • Drug use: No   • Sexual activity: Not on file   Other Topics Concern   • Not on file   Social History Narrative   • Not on file     Social Determinants of Health     Financial Resource Strain: Not on file   Food Insecurity: Not on file   Transportation Needs: Not on file   Physical Activity: Not on file   Stress: Not on file   Social Connections: Not on file   Intimate Partner Violence: Not on file   Housing Stability: Not on file     . Family History:History reviewed. No pertinent family history.       Meds/Allergies      Current Facility-Administered Medications   Medication Dose Route Frequency   • acetaminophen (TYLENOL) tablet 650 mg  650 mg Oral Q6H PRN   • apixaban (ELIQUIS) tablet 5 mg  5 mg Oral BID   • aspirin (ECOTRIN LOW STRENGTH) EC tablet 81 mg  81 mg Oral Daily   • atorvastatin (LIPITOR) tablet 20 mg  20 mg Oral Daily   • doxycycline hyclate (VIBRAMYCIN) capsule 100 mg  100 mg Oral Q12H [address]   • fluticasone (FLONASE) 50 mcg/act nasal spray 1 spray  1 spray Each Nare BID   • gabapentin (NEURONTIN) capsule 300 mg  300 mg Oral HS   • lidocaine (LIDODERM) 5 % patch 1 patch  1 patch Topical Daily   • lisinopril (ZESTRIL) tablet 10 mg  10 mg Oral Daily   • melatonin tablet 6 mg  6 mg Oral HS   • polyethylene glycol (MIRALAX) packet 17 g  17 g Oral Daily   • senna-docusate sodium (SENOKOT S) 8.6-50 mg per tablet 1 tablet  1 tablet Oral BID        Medications Prior to Admission   Medication   • acetaminophen-codeine (TYLENOL #3) 300-30 mg per tablet   • aspirin (ECOTRIN LOW STRENGTH) 81 mg EC tablet   • atorvastatin (LIPITOR) 20 mg tablet   • cholecalciferol (VITAMIN D3) 400 units tablet   • ELIQUIS 5 MG   • fluticasone (FLONASE) 50 mcg/act nasal spray   • gabapentin (NEURONTIN) 300 mg capsule   • lisinopril-hydrochlorothiazide (PRINZIDE,ZESTORETIC) 20-12.5 MG per tablet   • loratadine (CLARITIN) 10 mg tablet   • metoprolol succinate (TOPROL-XL) 25 mg 24 hr tablet   • predniSONE 10 mg tablet   • Sennosides (LAXATIVE PILLS PO)   • Sodium Fluoride 5000 PPM 1.1 % GEL   • traMADol (ULTRAM) 50 mg tablet       Allergies   Allergen Reactions   • Hydrocodone    • Oxycodone Anxiety and Headache           Objective   Vitals:   Visit Vitals  /67 (BP Location: Left arm)   Pulse 103   Temp 98.4 °F (36.9 °C) (Oral)   Resp 17   Ht 5' 3" (1.6 m)   Wt 80.7 kg (177 lb 14.6 oz)   SpO2 96%   BMI 31.52 kg/m²   OB Status Postmenopausal   Smoking Status Never   BSA 1.84 m²      Vitals:    08/04/23 2345 08/05/23 0500   Weight: 81.3 kg (179 lb 3.7 oz) 80.7 kg (177 lb 14.6 oz)       Intake/Output Summary (Last 24 hours) at 8/5/2023 1005  Last data filed at 8/5/2023 0801  Gross per 24 hour   Intake 120 ml   Output 900 ml   Net -780 ml       Invasive Devices     Peripheral Intravenous Line  Duration           Peripheral IV 08/03/23 Right Antecubital 1 day    Peripheral IV 08/04/23 Distal;Right;Ventral (anterior) Forearm <1 day ROS  Review of Systems   All other systems reviewed and are negative. as described in my history of present illness        PHYSICAL EXAM  Physical Exam  Vitals reviewed. Constitutional:       General: She is not in acute distress. Appearance: Normal appearance. She is normal weight. She is ill-appearing. HENT:      Head: Normocephalic and atraumatic. Right Ear: External ear normal.      Left Ear: External ear normal.      Nose: Nose normal.      Mouth/Throat:      Pharynx: Oropharynx is clear. Eyes:      Extraocular Movements: Extraocular movements intact. Conjunctiva/sclera: Conjunctivae normal.      Pupils: Pupils are equal, round, and reactive to light. Cardiovascular:      Rate and Rhythm: Normal rate. Heart sounds: Normal heart sounds. No murmur heard. Pulmonary:      Effort: No respiratory distress. Breath sounds: Normal breath sounds. No wheezing. Abdominal:      General: Bowel sounds are normal.      Palpations: Abdomen is soft. Musculoskeletal:         General: Swelling present. No deformity. Cervical back: Neck supple. No rigidity. Skin:     Coloration: Skin is not jaundiced. Findings: Bruising present. Neurological:      Mental Status: Mental status is at baseline. Motor: Weakness present. Psychiatric:         Mood and Affect: Mood normal.         Behavior: Behavior normal.         Thought Content:  Thought content normal.         Judgment: Judgment normal.               LAB RESULTS:      CBC:  Results from Last 12 Months   Lab Units 08/05/23  0503 08/04/23  0454 08/03/23  1306   WBC Thousand/uL 9.63 9.71 10.67*   HEMOGLOBIN g/dL 13.7 13.1 14.4   HEMATOCRIT % 41.4 39.0 42.5   MCV fL 92 92 91   PLATELETS Thousands/uL 192 198 207   RBC Million/uL 4.48 4.24 4.67   MCH pg 30.6 30.9 30.8   MCHC g/dL 33.1 33.6 33.9   RDW % 13.2 13.2 13.1   MPV fL 10.6 10.0 10.0   NRBC AUTO /100 WBCs 0 0 0        CMP:  Results from Last 12 Months   Lab Units 08/05/23  0503 08/04/23  0454 08/03/23  1306   POTASSIUM mmol/L 3.2* 3.6 4.5   CHLORIDE mmol/L 99 97 94*   CO2 mmol/L 25 25 25   BUN mg/dL 10 17 23   CREATININE mg/dL 0.86 0.93 1.05   CALCIUM mg/dL 8.5 9.0 9.6   AST U/L 21 18  --    ALT U/L 19 12  --    ALK PHOS U/L 50 41  --    EGFR ml/min/1.73sq m 60 55 47        Magnesium:   Results from Last 12 Months   Lab Units 08/05/23  0503 08/04/23  2300 08/04/23  0454   MAGNESIUM mg/dL 1.9 2.0 1.8*       A1C:  No results for input(s): "HGBA1C" in the last 8784 hours. TSH:  No results for input(s): "TSH" in the last 8784 hours. TSH 3rd Gen:  Results from Last 12 Months   Lab Units 08/03/23  1306   TSH 3RD GENERATON uIU/mL 1.319        PT/INR:  Results from Last 12 Months   Lab Units 08/04/23  2300 08/04/23  1035   PROTIME seconds 15.3* 15.0*   INR  1.19 1.21*       Lipid Panel:  No results for input(s): "CHOLESTEROL", "TRIG", "HDL", "NONHDLC" in the last 8784 hours. Invalid input(s): "LDLCAL"       ANNABEL  No results found for this or any previous visit. Echocardiogram  Results for orders placed during the hospital encounter of 08/03/23    Echo complete w/ contrast if indicated    Interpretation Summary  •  Left Ventricle: Left ventricular cavity size is normal. Wall thickness is mildly increased. There is mild concentric hypertrophy. Systolic function is normal (65%). Wall motion is normal. Diastolic function is normal.  •  Right Ventricle: Right ventricular cavity size is normal. Systolic function is normal.  •  Left Atrium: The atrium is mildly dilated. •  Right Atrium: The atrium is mildly dilated. •  Mitral Valve: There is mild annular calcification. There is mild regurgitation. •  Aorta: The aortic root is normal in size. The ascending aorta is mildly dilated (4 cm). No results found for this or any previous visit. Stress Test:   No results found for this or any previous visit.     No results found for this or any previous visit. Cardiac catheterization :  No results found for this or any previous visit. HOLTER MONITOR: 24 HOUR/48 HOUR MONITORS  No results found for this or any previous visit. AMB extended holter monitor  No results found for this or any previous visit. DEVICE CHECK:       No results found for this or any previous visit.         Code Status: Level 3 - DNAR and DNI  Advance Directive and Living Will:      Power of :    POLST:      Counseling / Coordination of Care  Detailed discussion done with regards to device on Monday       Eliud Mojica MD show

## 2024-01-05 NOTE — ED PROVIDER NOTE - CHIEF COMPLAINT
The patient is a 59y Female complaining of back pain/injury. no abdominal pain, no bloating, no constipation, no diarrhea, no nausea and no vomiting.

## 2024-01-17 NOTE — ED PROVIDER NOTE - CROS ED SKIN ALL NEG
[de-identified] : AMY MONREAL is a 58 year old female with PMHx of DMT2, last Hemoglobin A1C 8.8 %, HLD who present in the office for initial consultation who was referred by Eden Colon with the chief complaint of having RLQ pain that radiates to the back and leg. Patient has had the condition since August 2023. She has PSHx of Laparoscopic appendectomy on May 2023. She reports that she healed well. Ms. MONREAL reports that she was seeing by a surgeon at [facility] Dr Lafleur and was told that she might have a hernia, and he was thinking to fix it laparoscopic.  Patient reports that she gained some weight post appendectomy. Patient report that her RLQ pain radiates to the leg. She underwent a CT of the abdomen and pelvis on 11/27/2023 results c/w  No bilateral inguinal hernia or ventral pelvic hernia. Mild diastases of the anterior pelvic wall with loops of small bowel extending to the level of the anterior peritoneal wall (just deep to the subcutaneous tissues) and between the inferior epigastric muscles. On exam, no palpable lumps or masses felt, no hernia noted. Patient possibly has Spigelian hernia seeing on CT. Patient reports that she gained some weight post appendectomy. We recommend a bilateral lower ext. ultrasound of r/o inguinal or femoral hernia.  negative...

## 2024-03-09 ENCOUNTER — EMERGENCY (EMERGENCY)
Facility: HOSPITAL | Age: 64
LOS: 1 days | Discharge: HOME | End: 2024-03-09
Admitting: EMERGENCY MEDICINE
Payer: MEDICARE

## 2024-03-09 VITALS
DIASTOLIC BLOOD PRESSURE: 72 MMHG | RESPIRATION RATE: 18 BRPM | TEMPERATURE: 98 F | SYSTOLIC BLOOD PRESSURE: 118 MMHG | OXYGEN SATURATION: 100 % | HEART RATE: 61 BPM

## 2024-03-09 VITALS
WEIGHT: 184.09 LBS | SYSTOLIC BLOOD PRESSURE: 145 MMHG | HEART RATE: 89 BPM | OXYGEN SATURATION: 94 % | DIASTOLIC BLOOD PRESSURE: 83 MMHG | HEIGHT: 65 IN | RESPIRATION RATE: 16 BRPM | TEMPERATURE: 98 F
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
## 2024-03-09 LAB
ANION GAP SERPL CALC-SCNC: 10 MMOL/L — SIGNIFICANT CHANGE UP (ref 9–16)
BASOPHILS # BLD AUTO: 0.02 K/UL — SIGNIFICANT CHANGE UP (ref 0–0.2)
BASOPHILS NFR BLD AUTO: 0.3 % — SIGNIFICANT CHANGE UP (ref 0–2)
BUN SERPL-MCNC: 16 MG/DL — SIGNIFICANT CHANGE UP (ref 7–23)
CALCIUM SERPL-MCNC: 9 MG/DL — SIGNIFICANT CHANGE UP (ref 8.5–10.5)
CHLORIDE SERPL-SCNC: 109 MMOL/L — HIGH (ref 96–108)
CO2 SERPL-SCNC: 26 MMOL/L — SIGNIFICANT CHANGE UP (ref 22–31)
CREAT SERPL-MCNC: 0.6 MG/DL — SIGNIFICANT CHANGE UP (ref 0.5–1.3)
EGFR: 101 ML/MIN/1.73M2 — SIGNIFICANT CHANGE UP
EOSINOPHIL # BLD AUTO: 0.11 K/UL — SIGNIFICANT CHANGE UP (ref 0–0.5)
EOSINOPHIL NFR BLD AUTO: 1.9 % — SIGNIFICANT CHANGE UP (ref 0–6)
GLUCOSE SERPL-MCNC: 101 MG/DL — HIGH (ref 70–99)
HCT VFR BLD CALC: 40.3 % — SIGNIFICANT CHANGE UP (ref 34.5–45)
HGB BLD-MCNC: 12.6 G/DL — SIGNIFICANT CHANGE UP (ref 11.5–15.5)
IMM GRANULOCYTES NFR BLD AUTO: 0.2 % — SIGNIFICANT CHANGE UP (ref 0–0.9)
LYMPHOCYTES # BLD AUTO: 2.26 K/UL — SIGNIFICANT CHANGE UP (ref 1–3.3)
LYMPHOCYTES # BLD AUTO: 38.8 % — SIGNIFICANT CHANGE UP (ref 13–44)
MCHC RBC-ENTMCNC: 26.3 PG — LOW (ref 27–34)
MCHC RBC-ENTMCNC: 31.3 GM/DL — LOW (ref 32–36)
MCV RBC AUTO: 84.1 FL — SIGNIFICANT CHANGE UP (ref 80–100)
MONOCYTES # BLD AUTO: 0.38 K/UL — SIGNIFICANT CHANGE UP (ref 0–0.9)
MONOCYTES NFR BLD AUTO: 6.5 % — SIGNIFICANT CHANGE UP (ref 2–14)
NEUTROPHILS # BLD AUTO: 3.04 K/UL — SIGNIFICANT CHANGE UP (ref 1.8–7.4)
NEUTROPHILS NFR BLD AUTO: 52.3 % — SIGNIFICANT CHANGE UP (ref 43–77)
NRBC # BLD: 0 /100 WBCS — SIGNIFICANT CHANGE UP (ref 0–0)
NT-PROBNP SERPL-SCNC: 46 PG/ML — SIGNIFICANT CHANGE UP
PLATELET # BLD AUTO: 224 K/UL — SIGNIFICANT CHANGE UP (ref 150–400)
POTASSIUM SERPL-MCNC: 3.8 MMOL/L — SIGNIFICANT CHANGE UP (ref 3.5–5.3)
POTASSIUM SERPL-SCNC: 3.8 MMOL/L — SIGNIFICANT CHANGE UP (ref 3.5–5.3)
RBC # BLD: 4.79 M/UL — SIGNIFICANT CHANGE UP (ref 3.8–5.2)
RBC # FLD: 15.6 % — HIGH (ref 10.3–14.5)
SODIUM SERPL-SCNC: 145 MMOL/L — SIGNIFICANT CHANGE UP (ref 132–145)
TROPONIN I, HIGH SENSITIVITY RESULT: <4 NG/L — SIGNIFICANT CHANGE UP
WBC # BLD: 5.82 K/UL — SIGNIFICANT CHANGE UP (ref 3.8–10.5)
WBC # FLD AUTO: 5.82 K/UL — SIGNIFICANT CHANGE UP (ref 3.8–10.5)

## 2024-03-09 PROCEDURE — 99285 EMERGENCY DEPT VISIT HI MDM: CPT

## 2024-03-09 RX ORDER — IPRATROPIUM/ALBUTEROL SULFATE 18-103MCG
3 AEROSOL WITH ADAPTER (GRAM) INHALATION
Qty: 12 | Refills: 0
Start: 2024-03-09 | End: 2024-03-11

## 2024-03-09 RX ORDER — IPRATROPIUM/ALBUTEROL SULFATE 18-103MCG
3 AEROSOL WITH ADAPTER (GRAM) INHALATION ONCE
Refills: 0 | Status: COMPLETED | OUTPATIENT
Start: 2024-03-09 | End: 2024-03-09

## 2024-03-09 RX ADMIN — Medication 3 MILLILITER(S): at 09:33

## 2024-03-09 NOTE — ED PROVIDER NOTE - CLINICAL SUMMARY MEDICAL DECISION MAKING FREE TEXT BOX
Patient with history of hypertension, hyperlipidemia, COPD, active smoking presents today with complaint of wheezing and dizziness.  Patient notes dizziness is resolved prior to arrival and only lasted a few minutes, described as lightheadedness.  No active wheezing noted, no history of hospitalization or intubation related to her COPD.  Feeling improved after DuoNeb.  No active wheezing observed, but prolonged expiratory phase noted.  Will provide patient with rescue inhaler prescription for her nebulizer machine at home.  Given that patient has no infectious symptoms, no x-ray was done.  Labs and EKG were unremarkable.

## 2024-03-09 NOTE — ED PROVIDER NOTE - CPE EDP SKIN NORM
89 year old male patient with past medical history of Afib, HTN, hypothyroidism, chronic back pain, recurrent falls, anemia, dyslipidemia, facial cancer, CKD presented for bilateral hand swelling and pain and admitted with a primary diagnosis of cellulitis. More likely, it is looking like a rheumatological diagnosis.    # Swelling with redness dorsum of left hand, likely synovitis from rheumatological disorder  Leucocytosis on admission, mildly hypothermic, hemodynamically stable   denied trauma or skin breaks, pt has joint deformities.  suboptimal x rays of the hands from poor positioning, will repeat x rays.  - Cultures have been negative  - WBC has down-trended, no longer febrile  - Discontinue Abx  - Since most likely rheumatologic in nature, continue with prednisone taper as per Rheum recommendations   - ESR 88 elevated, will get CRP, EVE, RA Factor, CCP.    #Hypotension, lethargic   - possible sepsis- blood culture grew gram +  cocobacilli, likely contaminant, will start epimeric vanco and cefepime UA slightly positive  - will repeat blood culture   -ID consult  - Bolus 500 cc, cna go to 2L given weight  - if no improvement call CC consult.   - labs sent, follow up cbc, if dropping get ct abd and pelvic  -holding meds    #Bleeding from mouth- holding apixiban    #Blurriness CTH    # Agitation and combative behavior could be from steroids induced psychosis?  - waxing and waning nature to mentation  - patient had poor sleep last night, poor PO intake  - continue to encourage patient to eat his meals and stick to nighttime sleep schedule, continue to reorient patient as needed and monitor mental status      # CKD III  Slight increase in serum creatinine, BUN elevated most likely steroids, pt at baseline  decrease home Lasix to 20 mg daily   Monitor BMP  Avoid nephrotoxic medication     # Afib   Continue Amiodarone, Diltiazem and Metoprolol   Eliquis decreased to 2.5 BID given age and renal function.  Pt HR tachy, increased metoprolol  to 25 mg BID, will continue to monitor    # HTN   holding meds    # Hypothyroidism   Continue Levothyroxine   check TSH (Last TSH 22 in August)    # Chronic anemia s/p iron transfusions, Hemolytic? as wife states patient takes Prednisone for the anemia   Hb stable, monitor with daily CBCs   Continue Ferrous sulfate and Prednisone     # Hypokalemia   Wife states patient was on potassium supplementation at home and was stopped recently   S/P 40 mEq PO KCl in ED  Repeat BMP and replete if needed    # Prolonged QTc 532 ms   Repeat ECG  Avoid QTc prolonging medication   If no improvement consider adjusting dose of Amiodarone   Improving, continue daily EKGs, K >4 Mg >2    # Bilateral lower extremity edema and weakness   Recent improvement per wife, dressing changes were stopped  Decrease Lasix to 20 mg daily given slight increase in creatinine level and high BUN, would hold off on IVF for now as edema evident, if creatinine not improving despite decreasing dose of Lasix consider IVF   PT evaluation as patient has difficulty ambulating - has had multiple recent stays at SNF  Ambulate with walker   Fall precautions      # Facial cancer s/p resection   Outpatient F/U     # Chronic back pain   F/U pain management as outpatient   Continue home regimen of Percocet     # BPH   Continue Tamsulosin     # DLD   Lipitor in hospital     # Miscellaneous   - DVT prophylaxis : Patient on Eliquis   - GI prophylaxis not indicated   - Ambulate with a walker  - PT/Physiatry consult placed  - Diet DASH TLC   - From home with VN services normal... 89 year old male patient with past medical history of Afib, HTN, hypothyroidism, chronic back pain, recurrent falls, anemia, dyslipidemia, facial cancer, CKD presented for bilateral hand swelling and pain and admitted with a primary diagnosis of cellulitis. More likely, it is looking like a rheumatological diagnosis.    # Swelling with redness dorsum of left hand, likely synovitis from rheumatological disorder  Leucocytosis on admission, mildly hypothermic, hemodynamically stable   denied trauma or skin breaks, pt has joint deformities.  suboptimal x rays of the hands from poor positioning, will repeat x rays.  - Cultures have been negative  - WBC has down-trended, no longer febrile  - Discontinue Abx  - Since most likely rheumatologic in nature, continue with prednisone taper as per Rheum recommendations   - ESR 88 elevated, will get CRP, EVE, RA Factor, CCP.    #Hypotension, lethargic   - possible sepsis- blood culture grew gram +  cocobacilli, likely contaminant, will start epimeric vanco and cefepime UA slightly positive  - will repeat blood culture   -ID consult  -infectious w/u cxr, UA, blood cultures.   - Bolus 500 cc, cna go to 2L given weight  - if no improvement call CC consult.   - labs sent, follow up cbc, if dropping get ct abd and pelvic  -holding meds    #Bleeding from mouth- holding apixiban    #Blurriness CTH    # Agitation and combative behavior could be from steroids induced psychosis?  - waxing and waning nature to mentation  - patient had poor sleep last night, poor PO intake  - continue to encourage patient to eat his meals and stick to nighttime sleep schedule, continue to reorient patient as needed and monitor mental status      # CKD III  Slight increase in serum creatinine, BUN elevated most likely steroids, pt at baseline  decrease home Lasix to 20 mg daily   Monitor BMP  Avoid nephrotoxic medication     # Afib   Continue Amiodarone, Diltiazem and Metoprolol   Eliquis decreased to 2.5 BID given age and renal function.  Pt HR tachy, increased metoprolol  to 25 mg BID, will continue to monitor    # HTN   holding meds    # Hypothyroidism   Continue Levothyroxine   check TSH (Last TSH 22 in August)    # Chronic anemia s/p iron transfusions, Hemolytic? as wife states patient takes Prednisone for the anemia   Hb stable, monitor with daily CBCs   Continue Ferrous sulfate and Prednisone     # Hypokalemia   Wife states patient was on potassium supplementation at home and was stopped recently   S/P 40 mEq PO KCl in ED  Repeat BMP and replete if needed    # Prolonged QTc 532 ms   Repeat ECG  Avoid QTc prolonging medication   If no improvement consider adjusting dose of Amiodarone   Improving, continue daily EKGs, K >4 Mg >2    # Bilateral lower extremity edema and weakness   Recent improvement per wife, dressing changes were stopped  Decrease Lasix to 20 mg daily given slight increase in creatinine level and high BUN, would hold off on IVF for now as edema evident, if creatinine not improving despite decreasing dose of Lasix consider IVF   PT evaluation as patient has difficulty ambulating - has had multiple recent stays at SNF  Ambulate with walker   Fall precautions      # Facial cancer s/p resection   Outpatient F/U     # Chronic back pain   F/U pain management as outpatient   Continue home regimen of Percocet     # BPH   Continue Tamsulosin     # DLD   Lipitor in hospital     # Miscellaneous   - DVT prophylaxis : Patient on Eliquis   - GI prophylaxis not indicated   - Ambulate with a walker  - PT/Physiatry consult placed  - Diet DASH TLC   - From home with VN services

## 2024-03-09 NOTE — ED ADULT NURSE NOTE - OBJECTIVE STATEMENT
Pt aox4 pt states dizziness and wheezing since yesterday, pmh of copd (not o2 dependent), htn, +smoker (BEFAST-).

## 2024-03-09 NOTE — ED ADULT NURSE NOTE - NSFALLRISKINTERV_ED_ALL_ED

## 2024-03-09 NOTE — ED PROVIDER NOTE - RESPIRATORY, MLM
Breath sounds clear and equal bilaterally. prolonged expiratory phase. good air movement, no active wheezing.

## 2024-03-09 NOTE — ED PROVIDER NOTE - NSFOLLOWUPINSTRUCTIONS_ED_ALL_ED_FT
COPD (Chronic Obstructive Pulmonary Disease)    WHAT YOU NEED TO KNOW:    COPD (chronic obstructive pulmonary disease) can get worse quickly. Your healthcare providers will help you create a care plan to use at home. The plan will give directions on how to prevent or manage shortness of breath. Your family members or anyone who cares for you will also get directions to help you.Inspiration and Expiration         DISCHARGE INSTRUCTIONS:    Call your local emergency number (911 in the [place]) if:     You feel lightheaded, short of breath, and have chest pain.        Call your doctor if:     You are confused, dizzy, or feel faint.      Your arm or leg feels warm, tender, and painful. It may look swollen and red.      You cough up blood.      You have increased shortness of breath.      You need more medicine than usual to control your symptoms.      You are coughing or wheezing more than usual.      You are coughing up more mucus, or it has a new color or odor.      You gain more than 3 pounds in a week.      You have a fever, a runny or stuffy nose, and a sore throat, or other cold or flu symptoms.      Your skin, lips, or nails start to turn blue.      You have swelling in your legs or ankles.      You are very tired or weak for more than a day.      You notice changes in your mood, or changes in your ability to think or concentrate.      You have questions or concerns about your condition or care.    Medicines:     Short-acting bronchodilators may be called rescue inhalers or relievers. They relieve sudden, severe symptoms and start to work right away.      Long-acting bronchodilators may be called controllers. This medicine helps open the airways over time, and is used to decrease and prevent breathing problems. Long-acting bronchodilators should not be used to treat sudden, severe symptoms, such as trouble breathing.      Take your medicine as directed. Contact your healthcare provider if you think your medicine is not helping or if you have side effects. Tell him or her if you are allergic to any medicine. Keep a list of the medicines, vitamins, and herbs you take. Include the amounts, and when and why you take them. Bring the list or the pill bottles to follow-up visits. Carry your medicine list with you in case of an emergency.    Help make breathing easier:     Use pursed-lip breathing any time you feel short of breath. Take a deep breath in through your nose. Slowly breathe out through your mouth with your lips pursed. Try to take 2 times as long to breathe out as to breathe in. This helps you get rid of as much air from your lungs as possible. You can also practice this breathing pattern while you bend, lift, climb stairs, or exercise. It slows down your breathing and helps move more air in and out of your lungs.Breathe in Breathe out           Avoid anything that makes your symptoms worse. Stay out of high altitudes and places with high humidity. Stay inside, or cover your mouth and nose with a scarf when you are outside in cold weather. Stay inside on days when air pollution or pollen counts are high. Do not use aerosol sprays such as deodorant, bug spray, and hairspray.      Exercise daily. Exercise for at least 20 minutes each day to help increase your energy and decrease shortness of breath. Talk to your healthcare provider about the best exercise plan for you.Walking for Exercise         Manage COPD and help prevent exacerbations: COPD is a serious condition that gets worse over time. A COPD exacerbation means your symptoms suddenly get worse. It is important to prevent exacerbations. An exacerbation can cause more lung damage. COPD cannot be cured, but you can take action to feel better and prevent exacerbations:     Do not smoke.     If you currently smoke, quitting is the best way to keep COPD from getting worse. Nicotine and other substances can cause lung irritation or damage and make it harder for you to breathe. Do not use e-cigarettes or smokeless tobacco. They still contain nicotine. It may be hard to quit smoking. Your healthcare provider can help you find resources if you need help to quit. For support and more information:   [employer]  Phone: 1-897.114.3896  Web Address: www.[employer]          Avoid secondhand smoke. This is smoke another person exhales. Even if you have never smoked or have quit, it is important to avoid secondhand smoke. This smoke can also cause lung damage or trigger an exacerbation.      Go to pulmonary rehabilitation (rehab) if directed. Rehab is a program run by specialists who help you learn to manage COPD. Examples include a pulmonologist (lung specialist), dietitian, or exercise therapist. The specialists will help you make a plan to avoid triggers that cause an exacerbation.      Take your medicines as directed. Refill your medicines before you are out so that you do not miss a dose. Ask your healthcare provider if you have any questions on how to take your medicines.      Protect yourself from germs. Germs can get into your lungs and cause an infection. An infection in your lungs can create more mucus and make it harder to breathe. An infection can also create swelling in your airway and prevent air from getting in. You can decrease your risk for infection by doing the following:   Wash your hands often with soap and water. Carry germ-killing gel with you. You can use the gel to clean your hands when soap and water are not available. Handwashing           Do not touch your eyes, nose, or mouth unless you have washed your hands first.      Always cover your mouth when you cough. Cough into a tissue or your shirtsleeve so you do not spread germs from your hands.      Try to avoid people who have a cold or the flu. If you are sick, stay away from others as much as possible.      Drink more liquid. Liquid will help to keep your air passages moist and help you cough up mucus. Ask how much liquid to drink each day and which liquids are best for you.      Ask about vaccines. Influenza (the flu) and pneumonia can become life-threatening for a person who has COPD. Get a flu vaccine each year as soon as it becomes available. The pneumonia vaccine may be given every 5 years, or as directed. Ask about other vaccines you may need and when to get them.    Make decisions about your choices for future treatment: Ask for information about advanced medical directives and living felix. These documents help you write down your choices for treatment and for end-of-life care, such as hospice. It is best to complete them when you feel well and can think clearly about your wishes. The information can then be kept for future use if you are in the hospital or become very ill.    Follow up with your doctor as directed: You may need more tests. Your doctor may refer you to a specialist, depending on your needs. Some specialist services may be available through your pulmonary rehab program. Your doctor may also refer you to home health care or palliative (comfort) care. Write down your questions so you remember to ask them during your visits.

## 2024-03-09 NOTE — ED PROVIDER NOTE - OBJECTIVE STATEMENT
63-year-old female with history of COPD, active smoker, hypertension and hyperlipidemia presents today with complaint of wheezing.  Patient notes that she was smoking this morning when she started wheezing.  She has never been intubated or required hospitalization for her COPD and does not wear oxygen at home.  She had run out of her inhaler, so she presented to the ER.  Because of her haste and coming to the ER, she did not take her blood pressure medication this morning, and does not know what it is called.  She describes feeling slightly lightheaded initially but states that this is resolved.  She denies fever, chills, headache, vertigo, syncope, numbness, tingling, weakness, neck pain, back pain, chest pain, palpitations, shortness of breath, cough, extremity swelling.

## 2024-03-09 NOTE — ED PROVIDER NOTE - PATIENT PORTAL LINK FT
You can access the FollowMyHealth Patient Portal offered by [facility] by registering at the following website: [email or web address]. By joining [employer]’s FollowMyHealth portal, you will also be able to view your health information using other applications (apps) compatible with our system.

## 2024-03-09 NOTE — ED PROVIDER NOTE - NSICDXPASTMEDICALHX_GEN_ALL_CORE_FT
PAST MEDICAL HISTORY:  Asthma     COPD (chronic obstructive pulmonary disease)     HLD (hyperlipidemia)     Mild HTN     No pertinent past medical history     Smoking

## 2024-03-12 DIAGNOSIS — J44.1 CHRONIC OBSTRUCTIVE PULMONARY DISEASE WITH (ACUTE) EXACERBATION: ICD-10-CM

## 2024-03-12 DIAGNOSIS — R42 DIZZINESS AND GIDDINESS: ICD-10-CM

## 2024-03-12 DIAGNOSIS — Z91.018 ALLERGY TO OTHER FOODS: ICD-10-CM

## 2024-03-12 DIAGNOSIS — F17.200 NICOTINE DEPENDENCE, UNSPECIFIED, UNCOMPLICATED: ICD-10-CM

## 2024-03-12 DIAGNOSIS — R06.2 WHEEZING: ICD-10-CM

## 2024-03-12 DIAGNOSIS — Z88.0 ALLERGY STATUS TO PENICILLIN: ICD-10-CM

## 2024-03-12 DIAGNOSIS — E78.5 HYPERLIPIDEMIA, UNSPECIFIED: ICD-10-CM

## 2024-03-12 DIAGNOSIS — I10 ESSENTIAL (PRIMARY) HYPERTENSION: ICD-10-CM

## 2024-09-11 NOTE — PATIENT PROFILE ADULT. - BRADEN SCORE (IF 18 OR LESS ACTIVATE SKIN INJURY RISK INCREASED GUIDELINE), MLM
PAST MEDICAL HISTORY:  Acute anxiety     Acute depression     Acute gout     Epilepsy with grand mal seizures on awakening HX OF SEIZURE  DUE TO MEDICATION  LOW LEVELS    Hypertension     
16

## 2024-09-30 NOTE — ED PROVIDER NOTE - DR. NAME
Medication: citalopram (CeleXA) 20 MG tablet Wendy Sullivan MD   Last office visit date: 1.6.24 Wendy Sullivan MD Medication Refill Protocol Failed.   Seen by prescribing provider or same department within the last 12 months or has a future appt in 3 months      Wendy Sullivan MD - Graduated 6/27/24 POOL:    [place]

## 2025-01-10 ENCOUNTER — EMERGENCY (EMERGENCY)
Facility: HOSPITAL | Age: 65
LOS: 1 days | Discharge: HOME | End: 2025-01-10
Attending: EMERGENCY MEDICINE | Admitting: EMERGENCY MEDICINE
Payer: MEDICARE

## 2025-01-10 VITALS
HEART RATE: 74 BPM | DIASTOLIC BLOOD PRESSURE: 69 MMHG | TEMPERATURE: 98 F | RESPIRATION RATE: 16 BRPM | OXYGEN SATURATION: 93 % | SYSTOLIC BLOOD PRESSURE: 116 MMHG

## 2025-01-10 VITALS
OXYGEN SATURATION: 95 % | WEIGHT: 154.98 LBS | SYSTOLIC BLOOD PRESSURE: 147 MMHG | HEIGHT: 65 IN | HEART RATE: 73 BPM | RESPIRATION RATE: 16 BRPM | DIASTOLIC BLOOD PRESSURE: 88 MMHG | TEMPERATURE: 98 F

## 2025-01-10 DIAGNOSIS — E78.5 HYPERLIPIDEMIA, UNSPECIFIED: ICD-10-CM

## 2025-01-10 DIAGNOSIS — I10 ESSENTIAL (PRIMARY) HYPERTENSION: ICD-10-CM

## 2025-01-10 DIAGNOSIS — J44.9 CHRONIC OBSTRUCTIVE PULMONARY DISEASE, UNSPECIFIED: ICD-10-CM

## 2025-01-10 DIAGNOSIS — Z91.018 ALLERGY TO OTHER FOODS: ICD-10-CM
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
## 2025-01-10 DIAGNOSIS — M54.50 LOW BACK PAIN, UNSPECIFIED: ICD-10-CM
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
## 2025-01-10 DIAGNOSIS — Z88.1 ALLERGY STATUS TO OTHER ANTIBIOTIC AGENTS: ICD-10-CM

## 2025-01-10 DIAGNOSIS — M54.42 LUMBAGO WITH SCIATICA, LEFT SIDE: ICD-10-CM

## 2025-01-10 DIAGNOSIS — Z88.0 ALLERGY STATUS TO PENICILLIN: ICD-10-CM
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
## 2025-01-10 DIAGNOSIS — F17.200 NICOTINE DEPENDENCE, UNSPECIFIED, UNCOMPLICATED: ICD-10-CM

## 2025-01-10 PROCEDURE — 99284 EMERGENCY DEPT VISIT MOD MDM: CPT

## 2025-01-10 RX ORDER — IBUPROFEN 200 MG
1 TABLET ORAL
Qty: 28 | Refills: 0
Start: 2025-01-10 | End: 2025-01-16

## 2025-01-10 RX ORDER — IBUPROFEN 200 MG
600 TABLET ORAL ONCE
Refills: 0 | Status: COMPLETED | OUTPATIENT
Start: 2025-01-10 | End: 2025-01-10

## 2025-01-10 RX ORDER — GABAPENTIN 300 MG/1
1 CAPSULE ORAL
Qty: 21 | Refills: 0
Start: 2025-01-10 | End: 2025-01-16

## 2025-01-10 RX ORDER — DEXAMETHASONE SODIUM PHOSPHATE 4 MG/ML
10 VIAL (ML) INJECTION ONCE
Refills: 0 | Status: COMPLETED | OUTPATIENT
Start: 2025-01-10 | End: 2025-01-10

## 2025-01-10 RX ORDER — GABAPENTIN 300 MG/1
300 CAPSULE ORAL ONCE
Refills: 0 | Status: COMPLETED | OUTPATIENT
Start: 2025-01-10 | End: 2025-01-10

## 2025-01-10 RX ADMIN — GABAPENTIN 300 MILLIGRAM(S): 300 CAPSULE ORAL at 20:51

## 2025-01-10 RX ADMIN — Medication 600 MILLIGRAM(S): at 20:50

## 2025-01-10 RX ADMIN — Medication 10 MILLIGRAM(S): at 20:51

## 2025-01-10 NOTE — ED PROVIDER NOTE - CLINICAL SUMMARY MEDICAL DECISION MAKING FREE TEXT BOX
No traumatic injury or neurologic deficit.  Exacerbation of chronic sciatica type pain.  Will treat with oral medications and reassess.  Low threshold to image or obtain blood work should patient not improve.  No anterior pain/abdominal pain.  Do not suspect infectious process.

## 2025-01-10 NOTE — ED PROVIDER NOTE - OBJECTIVE STATEMENT
64-year-old female with a history of COPD, active smoker, hypertension and hyperlipidemia, history of sciatica and follows with pain management.  Patient has received cortisone shots in the back in the past and has required prescriptions of OxyContin for flares of sciatica/lower back pain.  Patient states that she bent over to lift something heavy earlier today and immediately developed left-sided paraspinal lower back pain radiating down the leg toward the knee.  Denies any traumatic injury/fall.  Pain is familiar to her but has not been this severe in quite some time.  No numbness/tingling or weakness.  No bowel or bladder dysfunction.

## 2025-01-10 NOTE — ED ADULT TRIAGE NOTE - CHIEF COMPLAINT QUOTE
Pt brought in by EMS with complaint of left knee pain radiating up to her hip and back X 3 days. Pt tearful upon arrival stating the pain worsened today. Reports history of sciatica and states he pain is similar to when she has sciatica.

## 2025-01-10 NOTE — ED ADULT NURSE NOTE - CHIEF COMPLAINT QUOTE
Pt brought in by EMS with complaint of left knee pain radiating up to her hip and back X 3 days. Pt tearful upon arrival stating the pain worsened today. Reports history of sciatica and states the pain is similar to when she has sciatica.

## 2025-01-10 NOTE — ED PROVIDER NOTE - PHYSICAL EXAMINATION
VITAL SIGNS: I have reviewed nursing notes and confirm.  CONSTITUTIONAL: Well-developed; well-nourished; in no acute distress.  SKIN: Skin exam is warm and dry, no acute rash.  HEAD: Normocephalic; atraumatic.  EYES: PERRL, EOM intact; conjunctiva and sclera clear.  ENT: No nasal discharge; airway clear.  NECK: Supple; non tender.  CARD: RRR  RESP: Unlabored. No wheezes, rales or rhonchi.  ABD: soft; non-distended; non-tender  BACK: no midline gale TTP, + L sided paraspinal lumbar  TTP  EXT: Normal ROM. No cyanosis or edema. Non-ttp all ext, distal pulses intact  NEURO: Alert, oriented. Grossly unremarkable.  PSYCH: Cooperative, appropriate.

## 2025-01-10 NOTE — ED PROVIDER NOTE - CARE PROVIDER_API CALL
Everton Sabillon.  Neurosurgery  [address] [facility] [place] [place] [address]  Phone: [phone]  Fax: [phone]  Follow Up Time:

## 2025-01-10 NOTE — ED PROVIDER NOTE - NSFOLLOWUPINSTRUCTIONS_ED_ALL_ED_FT
Sciatica    WHAT YOU NEED TO KNOW:    What is sciatica? Sciatica is a condition that causes pain along your sciatic nerve. The sciatic nerve runs from your spine through both sides of your buttocks. It then runs down the back of your thigh, into your lower leg and foot. Any place along your sciatic nerve may be compressed, inflamed, irritated, or stretched.  Sciatica    What causes sciatica? Sciatica may be related to certain activities, poor posture, and physical or psychological stress. Any of the following may cause or increase your risk for sciatica:    A slipped disc or narrowed spinal column that presses on the sciatic nerve    Muscle injury after you twist or lift a heavy object    Swelling from sprained or irritated muscles that press on the sciatic nerve    Extra body weight that increases pressure on your back and legs    A direct blow on the buttocks, thighs, or legs, car accidents, or falls that injure the sciatic nerve    A disease of the spine, such as arthritis, osteoporosis, or cancer  What are the signs and symptoms of sciatica? The symptoms of sciatica may be short-term or long-term:    Pain that goes from the lower back into your buttocks and down the back of your thigh    Numbness or tingling in your buttocks and legs    Muscle weakness, difficulty moving or controlling your leg or foot    Leg pain that increases with standing, sitting, or squatting  How is sciatica diagnosed? Your healthcare provider will ask about other health conditions you may have. Tell your provider about your job, history of back pain, diseases, or surgeries you have had. Your provider will examine you and move your legs to see what increases pain. You may also need any of the following:    X-rays of your back, hip, thigh, or leg may show other problems, such as fractures (broken bones).    A CT scan or MRI may show your sciatic nerve, muscles, and blood vessels. You may be given contrast liquid to help the area show up better in pictures. Tell the healthcare provider if you have ever had an allergic reaction to contrast liquid. Do not enter the MRI room with anything metal. Metal can cause serious injury. Tell the healthcare provider if you have any metal in or on your body.    An electromyography (EMG) test measures the electrical activity of your muscles at rest and with movement.    Nerve conduction tests check how surface nerves and related muscles respond to stimulation. Electrodes with wires or tiny needles are placed on certain areas, such as the buttocks and legs.  How is sciatica treated?    NSAIDs, such as ibuprofen, help decrease swelling, pain, and fever. This medicine is available with or without a doctor's order. NSAIDs can cause stomach bleeding or kidney problems in certain people. If you take blood thinner medicine, always ask your healthcare provider if NSAIDs are safe for you. Always read the medicine label and follow directions.    Acetaminophen decreases pain and fever. It is available without a doctor's order. Ask how much to take and how often to take it. Follow directions. Read the labels of all other medicines you are using to see if they also contain acetaminophen, or ask your doctor or pharmacist. Acetaminophen can cause liver damage if not taken correctly.    Muscle relaxers help decrease pain and muscle spasms.    Ultrasound therapy is a machine that uses sound waves to decrease pain. Topical medicines may be added to help decrease pain and inflammation.    Epidural steroid medicine may include both an anesthetic (numbing medicine) and a steroid. A steroid may decrease swelling and relieve pain. It is given as a shot close to the spine in the area where you have pain.    Chemonucleolysis is an injection given into the damaged disc to soften or shrink the disc.    Surgery may be done to correct problems such as a damaged disc or a tumor in your spine. Surgery may be done to decrease the pressure on the sciatic nerve. Healthcare providers may also release the muscle that may be pressing into your sciatic nerve.  How can I help manage sciatica?    Physical or occupational therapy may be recommended. A physical therapist teaches you exercises to help improve movement and strength, and to decrease pain. An occupational therapist teaches you skills to help with your daily activities.    Assistive devices may make you more comfortable or relieve pressure in the area. You may need back support, such as a back brace. You may need crutches, a cane, or a walker to decrease stress on your lower back and leg muscles. Ask your healthcare provider for more information about assistive devices and how to use them correctly.  How can sciatica be prevented?    Avoid pressure on your back and legs. Do not lift heavy objects, or stand or sit for long periods of time.    Lift objects safely. Keep your back straight and bend your knees when you  an object. Do not bend or twist your back when you lift.  How to Lift Items Safely      Maintain a healthy weight. Ask your healthcare provider what a healthy weight is for you. Your provider can help you create a weight loss plan, if needed.    Exercise as directed. Ask your healthcare provider about the best stretching, warmup, and exercise plan for you.  When should I seek immediate care?    You have trouble controlling your urine or bowel movements.    You have weakness in both legs.    You have numbness in your groin or buttocks.  When should I call my doctor?    You have pain in your lower back at night or when resting.    You have pain in your lower back with numbness below the knee.    You have weakness in one leg only.    You have questions or concerns about your condition or care.

## 2025-01-10 NOTE — ED ADULT NURSE NOTE - OBJECTIVE STATEMENT
pt presents to ed for left leg pain radiating up to upper leg, back and stomach x 3 days. endorses it feels similar to previous sciatica pain  pmh of sciatica pain, and COPD not on home O2

## 2025-01-10 NOTE — ED PROVIDER NOTE - PATIENT PORTAL LINK FT
You can access the FollowMyHealth Patient Portal offered by [facility] by registering at the following website: [email or web address]. By joining [employer]’s FollowMyHealth portal, you will also be able to view your health information using other applications (apps) compatible with our system.

## 2025-01-11 PROBLEM — I10 ESSENTIAL (PRIMARY) HYPERTENSION: Chronic | Status: ACTIVE | Noted: 2024-03-09

## 2025-01-11 PROBLEM — E78.5 HYPERLIPIDEMIA, UNSPECIFIED: Chronic | Status: ACTIVE | Noted: 2024-03-09

## 2025-01-11 RX ORDER — CYCLOBENZAPRINE HCL 10 MG
1 TABLET ORAL
Qty: 15 | Refills: 0
Start: 2025-01-11 | End: 2025-01-15

## 2025-01-24 NOTE — ED ADULT NURSE NOTE - CAS TRG GEN SKIN COLOR
"Care assumed; patient transferred to [facility] with admission of neutropenic fevers. Patient asymptomatic with fever of 101.2 and soft BP; patient denies any pain at this time and reports \"feeling fine.\" Tylenol given per MAR and MD messaged; will page if message not answered.    [id number] attempted to reach MD at number on chart; that number is for Mustapha;  unable to reach MD.  Will continue to reach.    " Normal for race

## 2025-05-30 NOTE — ED PROVIDER NOTE - NS ED MD DISPO DISCHARGE
[facility]     Treatment Plan Acknowledgement    Berny Bryant was involved in the treatment plan for this current admission, 5/29/2025.  Patient has seen and agrees to the Interdisciplinary Treatment Plan.  Berny Bryant verbalizes that he/she will work with the treatment team to meet the Interdisciplinary Treatment Plan goals.    X____________________________________    Date/Time: _____________________    Patient/Legally Authorized Representative  X____________________________________    Date/Time: _____________________    Treatment Team Member             [id number]     Home